# Patient Record
Sex: FEMALE | Race: WHITE | Employment: OTHER | URBAN - METROPOLITAN AREA
[De-identification: names, ages, dates, MRNs, and addresses within clinical notes are randomized per-mention and may not be internally consistent; named-entity substitution may affect disease eponyms.]

---

## 2017-08-01 ENCOUNTER — HOSPITAL ENCOUNTER (OUTPATIENT)
Dept: OTHER | Age: 82
Discharge: HOME | End: 2017-08-31
Attending: INTERNAL MEDICINE | Admitting: INTERNAL MEDICINE

## 2017-08-04 ENCOUNTER — HOSPITAL ENCOUNTER (OUTPATIENT)
Dept: GENERAL RADIOLOGY | Age: 82
Discharge: HOME | End: 2017-08-04
Attending: FAMILY MEDICINE | Admitting: FAMILY MEDICINE

## 2017-08-07 ENCOUNTER — HOSPITAL ENCOUNTER (OUTPATIENT)
Dept: INFUSION THERAPY | Age: 82
Discharge: HOME | End: 2017-08-07
Admitting: FAMILY MEDICINE

## 2017-08-07 VITALS
HEART RATE: 78 BPM | RESPIRATION RATE: 20 BRPM | TEMPERATURE: 98.4 F | SYSTOLIC BLOOD PRESSURE: 147 MMHG | DIASTOLIC BLOOD PRESSURE: 80 MMHG | OXYGEN SATURATION: 95 %

## 2017-08-07 RX ORDER — FUROSEMIDE 10 MG/ML
20 INJECTION INTRAMUSCULAR; INTRAVENOUS PRN
Status: DISCONTINUED | OUTPATIENT
Start: 2017-08-07 | End: 2017-08-08 | Stop reason: HOSPADM

## 2017-08-07 RX ORDER — 0.9 % SODIUM CHLORIDE 0.9 %
250 INTRAVENOUS SOLUTION INTRAVENOUS ONCE
Status: COMPLETED | OUTPATIENT
Start: 2017-08-07 | End: 2017-08-07

## 2017-08-07 RX ORDER — SODIUM CHLORIDE 0.9 % (FLUSH) 0.9 %
10 SYRINGE (ML) INJECTION PRN
Status: DISCONTINUED | OUTPATIENT
Start: 2017-08-07 | End: 2017-08-08 | Stop reason: HOSPADM

## 2017-08-07 RX ADMIN — Medication 10 ML: at 12:55

## 2017-08-07 RX ADMIN — Medication 250 ML: at 08:30

## 2017-08-07 RX ADMIN — Medication 10 ML: at 08:20

## 2017-08-07 RX ADMIN — FUROSEMIDE 20 MG: 10 INJECTION INTRAMUSCULAR; INTRAVENOUS at 12:55

## 2017-08-08 LAB
ALBUMIN SERPL-MCNC: 3.4 GM/DL (ref 3.4–5)
ANION GAP SERPL CALCULATED.3IONS-SCNC: 16 MMOL/L (ref 4–16)
BUN BLDV-MCNC: 21 MG/DL (ref 6–23)
CALCIUM SERPL-MCNC: 8.6 MG/DL (ref 8.3–10.6)
CHLORIDE BLD-SCNC: 101 MMOL/L (ref 99–110)
CO2: 22 MMOL/L (ref 21–32)
CREAT SERPL-MCNC: 1.6 MG/DL (ref 0.6–1.1)
GFR AFRICAN AMERICAN: 37 ML/MIN/1.73M2
GFR NON-AFRICAN AMERICAN: 30 ML/MIN/1.73M2
GLUCOSE BLD-MCNC: 107 MG/DL (ref 70–140)
HCT VFR BLD CALC: 29.8 % (ref 37–47)
HEMOGLOBIN: 9 GM/DL (ref 12.5–16)
MCH RBC QN AUTO: 28.1 PG (ref 27–31)
MCHC RBC AUTO-ENTMCNC: 30.2 % (ref 32–36)
MCV RBC AUTO: 93.1 FL (ref 78–100)
PDW BLD-RTO: 16.8 % (ref 11.7–14.9)
PHOSPHORUS: 3.4 MG/DL (ref 2.5–4.9)
PLATELET # BLD: 355 K/CU MM (ref 140–440)
PMV BLD AUTO: 9.2 FL (ref 7.5–11.1)
POTASSIUM SERPL-SCNC: 4 MMOL/L (ref 3.5–5.1)
RBC # BLD: 3.2 M/CU MM (ref 4.2–5.4)
SODIUM BLD-SCNC: 139 MMOL/L (ref 135–145)
WBC # BLD: 7.3 K/CU MM (ref 4–10.5)

## 2017-09-01 ENCOUNTER — HOSPITAL ENCOUNTER (OUTPATIENT)
Dept: OTHER | Age: 82
Discharge: HOME | End: 2017-09-30
Attending: INTERNAL MEDICINE | Admitting: INTERNAL MEDICINE

## 2019-01-01 ENCOUNTER — APPOINTMENT (OUTPATIENT)
Dept: GENERAL RADIOLOGY | Age: 84
DRG: 603 | End: 2019-01-01
Payer: MEDICARE

## 2019-01-01 ENCOUNTER — APPOINTMENT (OUTPATIENT)
Dept: ULTRASOUND IMAGING | Age: 84
DRG: 603 | End: 2019-01-01
Payer: MEDICARE

## 2019-01-01 ENCOUNTER — HOSPITAL ENCOUNTER (INPATIENT)
Age: 84
LOS: 5 days | Discharge: NURSING, REHAB OR LONG-TERM CARE | DRG: 603 | End: 2019-01-06
Attending: EMERGENCY MEDICINE | Admitting: INTERNAL MEDICINE
Payer: MEDICARE

## 2019-01-01 DIAGNOSIS — M79.601 PAIN OF RIGHT UPPER EXTREMITY: Primary | ICD-10-CM

## 2019-01-01 PROBLEM — N18.30 STAGE 3 CHRONIC KIDNEY DISEASE (HCC): Status: ACTIVE | Noted: 2019-01-01

## 2019-01-01 PROBLEM — E11.9 TYPE 2 DIABETES MELLITUS (HCC): Status: ACTIVE | Noted: 2019-01-01

## 2019-01-01 PROBLEM — D51.9 ANEMIA DUE TO VITAMIN B12 DEFICIENCY: Status: ACTIVE | Noted: 2019-01-01

## 2019-01-01 PROBLEM — I10 ESSENTIAL HYPERTENSION: Status: ACTIVE | Noted: 2019-01-01

## 2019-01-01 PROBLEM — L03.113 CELLULITIS OF RIGHT ARM: Status: ACTIVE | Noted: 2019-01-01

## 2019-01-01 LAB
ALBUMIN SERPL-MCNC: 3.6 GM/DL (ref 3.4–5)
ALP BLD-CCNC: 75 IU/L (ref 40–129)
ALT SERPL-CCNC: 12 U/L (ref 10–40)
ANION GAP SERPL CALCULATED.3IONS-SCNC: 15 MMOL/L (ref 4–16)
AST SERPL-CCNC: 16 IU/L (ref 15–37)
BASOPHILS ABSOLUTE: 0 K/CU MM
BASOPHILS RELATIVE PERCENT: 0.4 % (ref 0–1)
BILIRUB SERPL-MCNC: 0.5 MG/DL (ref 0–1)
BUN BLDV-MCNC: 25 MG/DL (ref 6–23)
CALCIUM SERPL-MCNC: 9.1 MG/DL (ref 8.3–10.6)
CHLORIDE BLD-SCNC: 98 MMOL/L (ref 99–110)
CO2: 21 MMOL/L (ref 21–32)
CREAT SERPL-MCNC: 1.6 MG/DL (ref 0.6–1.1)
DIFFERENTIAL TYPE: ABNORMAL
EOSINOPHILS ABSOLUTE: 0.1 K/CU MM
EOSINOPHILS RELATIVE PERCENT: 1.4 % (ref 0–3)
GFR AFRICAN AMERICAN: 37 ML/MIN/1.73M2
GFR NON-AFRICAN AMERICAN: 30 ML/MIN/1.73M2
GLUCOSE BLD-MCNC: 115 MG/DL (ref 70–99)
GLUCOSE BLD-MCNC: 150 MG/DL (ref 70–99)
HCT VFR BLD CALC: 33.4 % (ref 37–47)
HEMOGLOBIN: 10.3 GM/DL (ref 12.5–16)
IMMATURE NEUTROPHIL %: 0.2 % (ref 0–0.43)
LACTATE: 0.7 MMOL/L (ref 0.4–2)
LYMPHOCYTES ABSOLUTE: 0.8 K/CU MM
LYMPHOCYTES RELATIVE PERCENT: 8.5 % (ref 24–44)
MCH RBC QN AUTO: 32.3 PG (ref 27–31)
MCHC RBC AUTO-ENTMCNC: 30.8 % (ref 32–36)
MCV RBC AUTO: 104.7 FL (ref 78–100)
MONOCYTES ABSOLUTE: 0.6 K/CU MM
MONOCYTES RELATIVE PERCENT: 6.1 % (ref 0–4)
NUCLEATED RBC %: 0 %
PDW BLD-RTO: 13.1 % (ref 11.7–14.9)
PLATELET # BLD: 253 K/CU MM (ref 140–440)
PMV BLD AUTO: 9.1 FL (ref 7.5–11.1)
POTASSIUM SERPL-SCNC: 4 MMOL/L (ref 3.5–5.1)
RBC # BLD: 3.19 M/CU MM (ref 4.2–5.4)
SEGMENTED NEUTROPHILS ABSOLUTE COUNT: 7.9 K/CU MM
SEGMENTED NEUTROPHILS RELATIVE PERCENT: 83.4 % (ref 36–66)
SODIUM BLD-SCNC: 134 MMOL/L (ref 135–145)
TOTAL CK: 44 IU/L (ref 26–140)
TOTAL IMMATURE NEUTOROPHIL: 0.02 K/CU MM
TOTAL NUCLEATED RBC: 0 K/CU MM
TOTAL PROTEIN: 7 GM/DL (ref 6.4–8.2)
TROPONIN T: <0.01 NG/ML
WBC # BLD: 9.5 K/CU MM (ref 4–10.5)

## 2019-01-01 PROCEDURE — 87040 BLOOD CULTURE FOR BACTERIA: CPT

## 2019-01-01 PROCEDURE — 73030 X-RAY EXAM OF SHOULDER: CPT

## 2019-01-01 PROCEDURE — 6370000000 HC RX 637 (ALT 250 FOR IP): Performed by: NURSE PRACTITIONER

## 2019-01-01 PROCEDURE — 82962 GLUCOSE BLOOD TEST: CPT

## 2019-01-01 PROCEDURE — 83605 ASSAY OF LACTIC ACID: CPT

## 2019-01-01 PROCEDURE — 80053 COMPREHEN METABOLIC PANEL: CPT

## 2019-01-01 PROCEDURE — 6360000002 HC RX W HCPCS: Performed by: EMERGENCY MEDICINE

## 2019-01-01 PROCEDURE — 73110 X-RAY EXAM OF WRIST: CPT

## 2019-01-01 PROCEDURE — 93971 EXTREMITY STUDY: CPT

## 2019-01-01 PROCEDURE — 1200000000 HC SEMI PRIVATE

## 2019-01-01 PROCEDURE — 73080 X-RAY EXAM OF ELBOW: CPT

## 2019-01-01 PROCEDURE — 84484 ASSAY OF TROPONIN QUANT: CPT

## 2019-01-01 PROCEDURE — 85025 COMPLETE CBC W/AUTO DIFF WBC: CPT

## 2019-01-01 PROCEDURE — 99285 EMERGENCY DEPT VISIT HI MDM: CPT

## 2019-01-01 PROCEDURE — 96374 THER/PROPH/DIAG INJ IV PUSH: CPT

## 2019-01-01 PROCEDURE — 82550 ASSAY OF CK (CPK): CPT

## 2019-01-01 PROCEDURE — 2580000003 HC RX 258: Performed by: EMERGENCY MEDICINE

## 2019-01-01 PROCEDURE — 96376 TX/PRO/DX INJ SAME DRUG ADON: CPT

## 2019-01-01 PROCEDURE — 93005 ELECTROCARDIOGRAM TRACING: CPT | Performed by: EMERGENCY MEDICINE

## 2019-01-01 PROCEDURE — 2580000003 HC RX 258: Performed by: NURSE PRACTITIONER

## 2019-01-01 PROCEDURE — 36415 COLL VENOUS BLD VENIPUNCTURE: CPT

## 2019-01-01 PROCEDURE — 96375 TX/PRO/DX INJ NEW DRUG ADDON: CPT

## 2019-01-01 RX ORDER — CETIRIZINE HYDROCHLORIDE 10 MG/1
10 TABLET ORAL DAILY
Status: DISCONTINUED | OUTPATIENT
Start: 2019-01-01 | End: 2019-01-02

## 2019-01-01 RX ORDER — DIGOXIN 125 MCG
125 TABLET ORAL DAILY
Status: DISCONTINUED | OUTPATIENT
Start: 2019-01-01 | End: 2019-01-06 | Stop reason: HOSPADM

## 2019-01-01 RX ORDER — IRON POLYSACCHARIDE COMPLEX 150 MG
150 CAPSULE ORAL 2 TIMES DAILY WITH MEALS
Status: DISCONTINUED | OUTPATIENT
Start: 2019-01-01 | End: 2019-01-06 | Stop reason: HOSPADM

## 2019-01-01 RX ORDER — SODIUM CHLORIDE 0.9 % (FLUSH) 0.9 %
10 SYRINGE (ML) INJECTION EVERY 12 HOURS SCHEDULED
Status: DISCONTINUED | OUTPATIENT
Start: 2019-01-01 | End: 2019-01-06 | Stop reason: HOSPADM

## 2019-01-01 RX ORDER — MORPHINE SULFATE 4 MG/ML
2 INJECTION, SOLUTION INTRAMUSCULAR; INTRAVENOUS EVERY 30 MIN PRN
Status: DISCONTINUED | OUTPATIENT
Start: 2019-01-01 | End: 2019-01-01 | Stop reason: SDUPTHER

## 2019-01-01 RX ORDER — MORPHINE SULFATE 4 MG/ML
2 INJECTION, SOLUTION INTRAMUSCULAR; INTRAVENOUS EVERY 4 HOURS PRN
Status: DISCONTINUED | OUTPATIENT
Start: 2019-01-01 | End: 2019-01-06 | Stop reason: HOSPADM

## 2019-01-01 RX ORDER — GABAPENTIN 100 MG/1
100 CAPSULE ORAL 3 TIMES DAILY
Status: DISCONTINUED | OUTPATIENT
Start: 2019-01-01 | End: 2019-01-06 | Stop reason: HOSPADM

## 2019-01-01 RX ORDER — SODIUM CHLORIDE 0.9 % (FLUSH) 0.9 %
10 SYRINGE (ML) INJECTION PRN
Status: DISCONTINUED | OUTPATIENT
Start: 2019-01-01 | End: 2019-01-06 | Stop reason: HOSPADM

## 2019-01-01 RX ORDER — CARVEDILOL 6.25 MG/1
6.25 TABLET ORAL 2 TIMES DAILY WITH MEALS
Status: DISCONTINUED | OUTPATIENT
Start: 2019-01-01 | End: 2019-01-06 | Stop reason: HOSPADM

## 2019-01-01 RX ORDER — ONDANSETRON 2 MG/ML
4 INJECTION INTRAMUSCULAR; INTRAVENOUS EVERY 30 MIN PRN
Status: DISCONTINUED | OUTPATIENT
Start: 2019-01-01 | End: 2019-01-01 | Stop reason: SDUPTHER

## 2019-01-01 RX ORDER — ATORVASTATIN CALCIUM 40 MG/1
40 TABLET, FILM COATED ORAL DAILY
Status: DISCONTINUED | OUTPATIENT
Start: 2019-01-01 | End: 2019-01-06 | Stop reason: HOSPADM

## 2019-01-01 RX ORDER — HYDROCODONE BITARTRATE AND ACETAMINOPHEN 5; 325 MG/1; MG/1
1 TABLET ORAL EVERY 4 HOURS PRN
Status: DISCONTINUED | OUTPATIENT
Start: 2019-01-01 | End: 2019-01-06 | Stop reason: HOSPADM

## 2019-01-01 RX ORDER — AMLODIPINE BESYLATE 10 MG/1
10 TABLET ORAL DAILY
Status: DISCONTINUED | OUTPATIENT
Start: 2019-01-01 | End: 2019-01-06 | Stop reason: HOSPADM

## 2019-01-01 RX ORDER — ONDANSETRON 2 MG/ML
4 INJECTION INTRAMUSCULAR; INTRAVENOUS EVERY 6 HOURS PRN
Status: DISCONTINUED | OUTPATIENT
Start: 2019-01-01 | End: 2019-01-06 | Stop reason: HOSPADM

## 2019-01-01 RX ORDER — FUROSEMIDE 40 MG/1
40 TABLET ORAL DAILY
Status: DISCONTINUED | OUTPATIENT
Start: 2019-01-01 | End: 2019-01-06 | Stop reason: HOSPADM

## 2019-01-01 RX ORDER — ASPIRIN 81 MG/1
81 TABLET, CHEWABLE ORAL DAILY
Status: DISCONTINUED | OUTPATIENT
Start: 2019-01-01 | End: 2019-01-06 | Stop reason: HOSPADM

## 2019-01-01 RX ADMIN — HYDROCODONE BITARTRATE AND ACETAMINOPHEN 1 TABLET: 5; 325 TABLET ORAL at 21:51

## 2019-01-01 RX ADMIN — SODIUM CHLORIDE 3 G: 900 INJECTION INTRAVENOUS at 19:46

## 2019-01-01 RX ADMIN — AMLODIPINE BESYLATE 10 MG: 10 TABLET ORAL at 23:09

## 2019-01-01 RX ADMIN — FUROSEMIDE 40 MG: 40 TABLET ORAL at 23:09

## 2019-01-01 RX ADMIN — GABAPENTIN 100 MG: 100 CAPSULE ORAL at 23:08

## 2019-01-01 RX ADMIN — RIVAROXABAN 15 MG: 15 TABLET, FILM COATED ORAL at 23:10

## 2019-01-01 RX ADMIN — ONDANSETRON 4 MG: 2 INJECTION INTRAMUSCULAR; INTRAVENOUS at 15:44

## 2019-01-01 RX ADMIN — CARVEDILOL 6.25 MG: 6.25 TABLET, FILM COATED ORAL at 23:09

## 2019-01-01 RX ADMIN — MORPHINE SULFATE 2 MG: 4 INJECTION INTRAVENOUS at 15:43

## 2019-01-01 RX ADMIN — ASPIRIN 81 MG 81 MG: 81 TABLET ORAL at 23:09

## 2019-01-01 RX ADMIN — Medication 150 MG: at 23:08

## 2019-01-01 RX ADMIN — MORPHINE SULFATE 2 MG: 4 INJECTION INTRAVENOUS at 19:46

## 2019-01-01 RX ADMIN — ATORVASTATIN CALCIUM 40 MG: 40 TABLET, FILM COATED ORAL at 23:08

## 2019-01-01 RX ADMIN — SODIUM CHLORIDE, PRESERVATIVE FREE 10 ML: 5 INJECTION INTRAVENOUS at 23:11

## 2019-01-01 RX ADMIN — DIGOXIN 125 MCG: 125 TABLET ORAL at 23:09

## 2019-01-01 RX ADMIN — ONDANSETRON 4 MG: 2 INJECTION INTRAMUSCULAR; INTRAVENOUS at 19:46

## 2019-01-01 RX ADMIN — LINAGLIPTIN 5 MG: 5 TABLET, FILM COATED ORAL at 23:08

## 2019-01-01 ASSESSMENT — PAIN DESCRIPTION - ORIENTATION
ORIENTATION: RIGHT
ORIENTATION: RIGHT

## 2019-01-01 ASSESSMENT — PAIN SCALES - GENERAL
PAINLEVEL_OUTOF10: 9
PAINLEVEL_OUTOF10: 8
PAINLEVEL_OUTOF10: 9
PAINLEVEL_OUTOF10: 10

## 2019-01-01 ASSESSMENT — PAIN DESCRIPTION - LOCATION
LOCATION: ARM
LOCATION: ARM

## 2019-01-01 ASSESSMENT — PAIN DESCRIPTION - PAIN TYPE
TYPE: ACUTE PAIN
TYPE: ACUTE PAIN

## 2019-01-02 LAB
ALBUMIN SERPL-MCNC: 3.5 GM/DL (ref 3.4–5)
ALBUMIN SERPL-MCNC: 3.5 GM/DL (ref 3.4–5)
ALP BLD-CCNC: 69 IU/L (ref 40–128)
ALP BLD-CCNC: 69 IU/L (ref 40–129)
ALT SERPL-CCNC: 10 U/L (ref 10–40)
ALT SERPL-CCNC: 10 U/L (ref 10–40)
ANION GAP SERPL CALCULATED.3IONS-SCNC: 13 MMOL/L (ref 4–16)
AST SERPL-CCNC: 15 IU/L (ref 15–37)
AST SERPL-CCNC: 15 IU/L (ref 15–37)
BASOPHILS ABSOLUTE: 0 K/CU MM
BASOPHILS RELATIVE PERCENT: 0.5 % (ref 0–1)
BILIRUB SERPL-MCNC: 0.4 MG/DL (ref 0–1)
BILIRUB SERPL-MCNC: 0.4 MG/DL (ref 0–1)
BILIRUBIN DIRECT: 0.2 MG/DL (ref 0–0.3)
BILIRUBIN, INDIRECT: 0.2 MG/DL (ref 0–0.7)
BUN BLDV-MCNC: 27 MG/DL (ref 6–23)
CALCIUM SERPL-MCNC: 8.5 MG/DL (ref 8.3–10.6)
CHLORIDE BLD-SCNC: 101 MMOL/L (ref 99–110)
CO2: 25 MMOL/L (ref 21–32)
CREAT SERPL-MCNC: 1.6 MG/DL (ref 0.6–1.1)
DIFFERENTIAL TYPE: ABNORMAL
DIGOXIN LEVEL: 1.7 NG/ML (ref 0.8–2)
EOSINOPHILS ABSOLUTE: 0.1 K/CU MM
EOSINOPHILS RELATIVE PERCENT: 1.3 % (ref 0–3)
FOLATE: 19.9 NG/ML (ref 3.1–17.5)
GFR AFRICAN AMERICAN: 37 ML/MIN/1.73M2
GFR NON-AFRICAN AMERICAN: 30 ML/MIN/1.73M2
GLUCOSE BLD-MCNC: 101 MG/DL (ref 70–99)
GLUCOSE BLD-MCNC: 128 MG/DL (ref 70–99)
GLUCOSE BLD-MCNC: 152 MG/DL (ref 70–99)
GLUCOSE BLD-MCNC: 93 MG/DL (ref 70–99)
HCT VFR BLD CALC: 29.6 % (ref 37–47)
HEMOGLOBIN: 9.3 GM/DL (ref 12.5–16)
IMMATURE NEUTROPHIL %: 0.7 % (ref 0–0.43)
IRON: 16 UG/DL (ref 37–145)
LACTATE: 0.6 MMOL/L (ref 0.4–2)
LYMPHOCYTES ABSOLUTE: 1.5 K/CU MM
LYMPHOCYTES RELATIVE PERCENT: 20.1 % (ref 24–44)
MCH RBC QN AUTO: 32.7 PG (ref 27–31)
MCHC RBC AUTO-ENTMCNC: 31.4 % (ref 32–36)
MCV RBC AUTO: 104.2 FL (ref 78–100)
MONOCYTES ABSOLUTE: 0.8 K/CU MM
MONOCYTES RELATIVE PERCENT: 10.5 % (ref 0–4)
NUCLEATED RBC %: 0 %
PCT TRANSFERRIN: 6 % (ref 10–44)
PDW BLD-RTO: 13 % (ref 11.7–14.9)
PLATELET # BLD: 222 K/CU MM (ref 140–440)
PMV BLD AUTO: 8.9 FL (ref 7.5–11.1)
POTASSIUM SERPL-SCNC: 4 MMOL/L (ref 3.5–5.1)
RBC # BLD: 2.84 M/CU MM (ref 4.2–5.4)
SEGMENTED NEUTROPHILS ABSOLUTE COUNT: 5 K/CU MM
SEGMENTED NEUTROPHILS RELATIVE PERCENT: 66.9 % (ref 36–66)
SODIUM BLD-SCNC: 139 MMOL/L (ref 135–145)
TOTAL IMMATURE NEUTOROPHIL: 0.05 K/CU MM
TOTAL IRON BINDING CAPACITY: 256 UG/DL (ref 250–450)
TOTAL NUCLEATED RBC: 0 K/CU MM
TOTAL PROTEIN: 6.1 GM/DL (ref 6.4–8.2)
TOTAL PROTEIN: 6.1 GM/DL (ref 6.4–8.2)
UNSATURATED IRON BINDING CAPACITY: 240 UG/DL (ref 110–370)
URIC ACID: 7.8 MG/DL (ref 2.6–6)
VITAMIN B-12: 1174 PG/ML (ref 211–911)
WBC # BLD: 7.5 K/CU MM (ref 4–10.5)

## 2019-01-02 PROCEDURE — 6370000000 HC RX 637 (ALT 250 FOR IP): Performed by: HOSPITALIST

## 2019-01-02 PROCEDURE — 83550 IRON BINDING TEST: CPT

## 2019-01-02 PROCEDURE — 84550 ASSAY OF BLOOD/URIC ACID: CPT

## 2019-01-02 PROCEDURE — G0378 HOSPITAL OBSERVATION PER HR: HCPCS

## 2019-01-02 PROCEDURE — 82962 GLUCOSE BLOOD TEST: CPT

## 2019-01-02 PROCEDURE — 6370000000 HC RX 637 (ALT 250 FOR IP): Performed by: NURSE PRACTITIONER

## 2019-01-02 PROCEDURE — 1200000000 HC SEMI PRIVATE

## 2019-01-02 PROCEDURE — 80053 COMPREHEN METABOLIC PANEL: CPT

## 2019-01-02 PROCEDURE — 83540 ASSAY OF IRON: CPT

## 2019-01-02 PROCEDURE — 80162 ASSAY OF DIGOXIN TOTAL: CPT

## 2019-01-02 PROCEDURE — 36415 COLL VENOUS BLD VENIPUNCTURE: CPT

## 2019-01-02 PROCEDURE — 82607 VITAMIN B-12: CPT

## 2019-01-02 PROCEDURE — 85025 COMPLETE CBC W/AUTO DIFF WBC: CPT

## 2019-01-02 PROCEDURE — 6360000002 HC RX W HCPCS: Performed by: NURSE PRACTITIONER

## 2019-01-02 PROCEDURE — 6370000000 HC RX 637 (ALT 250 FOR IP): Performed by: INTERNAL MEDICINE

## 2019-01-02 PROCEDURE — 93010 ELECTROCARDIOGRAM REPORT: CPT | Performed by: INTERNAL MEDICINE

## 2019-01-02 PROCEDURE — 2580000003 HC RX 258: Performed by: NURSE PRACTITIONER

## 2019-01-02 PROCEDURE — 82248 BILIRUBIN DIRECT: CPT

## 2019-01-02 PROCEDURE — 82746 ASSAY OF FOLIC ACID SERUM: CPT

## 2019-01-02 PROCEDURE — 83605 ASSAY OF LACTIC ACID: CPT

## 2019-01-02 RX ORDER — ACETAMINOPHEN 325 MG/1
650 TABLET ORAL EVERY 4 HOURS PRN
Status: DISCONTINUED | OUTPATIENT
Start: 2019-01-02 | End: 2019-01-06 | Stop reason: HOSPADM

## 2019-01-02 RX ORDER — CETIRIZINE HYDROCHLORIDE 10 MG/1
5 TABLET ORAL DAILY
Status: DISCONTINUED | OUTPATIENT
Start: 2019-01-02 | End: 2019-01-06 | Stop reason: HOSPADM

## 2019-01-02 RX ORDER — CETIRIZINE HYDROCHLORIDE 10 MG/1
5 TABLET ORAL ONCE
Status: COMPLETED | OUTPATIENT
Start: 2019-01-02 | End: 2019-01-02

## 2019-01-02 RX ADMIN — CARVEDILOL 6.25 MG: 6.25 TABLET, FILM COATED ORAL at 11:18

## 2019-01-02 RX ADMIN — CETIRIZINE HYDROCHLORIDE 5 MG: 10 TABLET, FILM COATED ORAL at 11:17

## 2019-01-02 RX ADMIN — GABAPENTIN 100 MG: 100 CAPSULE ORAL at 15:32

## 2019-01-02 RX ADMIN — FUROSEMIDE 40 MG: 40 TABLET ORAL at 11:18

## 2019-01-02 RX ADMIN — AMLODIPINE BESYLATE 10 MG: 10 TABLET ORAL at 11:18

## 2019-01-02 RX ADMIN — HYDROCODONE BITARTRATE AND ACETAMINOPHEN 1 TABLET: 5; 325 TABLET ORAL at 11:17

## 2019-01-02 RX ADMIN — SODIUM CHLORIDE, PRESERVATIVE FREE 10 ML: 5 INJECTION INTRAVENOUS at 11:25

## 2019-01-02 RX ADMIN — GABAPENTIN 100 MG: 100 CAPSULE ORAL at 11:18

## 2019-01-02 RX ADMIN — SODIUM CHLORIDE, PRESERVATIVE FREE 10 ML: 5 INJECTION INTRAVENOUS at 21:37

## 2019-01-02 RX ADMIN — HYDROCODONE BITARTRATE AND ACETAMINOPHEN 1 TABLET: 5; 325 TABLET ORAL at 19:37

## 2019-01-02 RX ADMIN — DIGOXIN 125 MCG: 125 TABLET ORAL at 11:17

## 2019-01-02 RX ADMIN — GABAPENTIN 100 MG: 100 CAPSULE ORAL at 21:36

## 2019-01-02 RX ADMIN — SODIUM CHLORIDE 3 G: 900 INJECTION INTRAVENOUS at 11:24

## 2019-01-02 RX ADMIN — INSULIN LISPRO 1 UNITS: 100 INJECTION, SOLUTION INTRAVENOUS; SUBCUTANEOUS at 21:37

## 2019-01-02 RX ADMIN — CARVEDILOL 6.25 MG: 6.25 TABLET, FILM COATED ORAL at 15:31

## 2019-01-02 RX ADMIN — Medication 150 MG: at 19:37

## 2019-01-02 RX ADMIN — Medication 150 MG: at 11:17

## 2019-01-02 RX ADMIN — CETIRIZINE HYDROCHLORIDE 5 MG: 10 TABLET, FILM COATED ORAL at 02:36

## 2019-01-02 RX ADMIN — ASPIRIN 81 MG 81 MG: 81 TABLET ORAL at 11:18

## 2019-01-02 RX ADMIN — RIVAROXABAN 15 MG: 15 TABLET, FILM COATED ORAL at 15:31

## 2019-01-02 RX ADMIN — LINAGLIPTIN 5 MG: 5 TABLET, FILM COATED ORAL at 11:16

## 2019-01-02 RX ADMIN — ACETAMINOPHEN 650 MG: 325 TABLET ORAL at 21:36

## 2019-01-02 RX ADMIN — HYDROCODONE BITARTRATE AND ACETAMINOPHEN 1 TABLET: 5; 325 TABLET ORAL at 15:31

## 2019-01-02 RX ADMIN — ATORVASTATIN CALCIUM 40 MG: 40 TABLET, FILM COATED ORAL at 11:17

## 2019-01-02 ASSESSMENT — PAIN DESCRIPTION - FREQUENCY: FREQUENCY: CONTINUOUS

## 2019-01-02 ASSESSMENT — PAIN SCALES - GENERAL
PAINLEVEL_OUTOF10: 8
PAINLEVEL_OUTOF10: 0
PAINLEVEL_OUTOF10: 8
PAINLEVEL_OUTOF10: 6
PAINLEVEL_OUTOF10: 6
PAINLEVEL_OUTOF10: 2

## 2019-01-02 ASSESSMENT — PAIN DESCRIPTION - ORIENTATION
ORIENTATION: RIGHT
ORIENTATION: RIGHT

## 2019-01-02 ASSESSMENT — PAIN DESCRIPTION - LOCATION
LOCATION: ARM
LOCATION: ARM

## 2019-01-02 ASSESSMENT — PAIN DESCRIPTION - PAIN TYPE
TYPE: ACUTE PAIN
TYPE: ACUTE PAIN

## 2019-01-02 ASSESSMENT — PAIN DESCRIPTION - PROGRESSION
CLINICAL_PROGRESSION: GRADUALLY WORSENING
CLINICAL_PROGRESSION: GRADUALLY WORSENING

## 2019-01-02 ASSESSMENT — PAIN DESCRIPTION - ONSET: ONSET: ON-GOING

## 2019-01-03 LAB
GLUCOSE BLD-MCNC: 118 MG/DL (ref 70–99)
GLUCOSE BLD-MCNC: 134 MG/DL (ref 70–99)
GLUCOSE BLD-MCNC: 145 MG/DL (ref 70–99)
GLUCOSE BLD-MCNC: 189 MG/DL (ref 70–99)

## 2019-01-03 PROCEDURE — G0328 FECAL BLOOD SCRN IMMUNOASSAY: HCPCS

## 2019-01-03 PROCEDURE — 2580000003 HC RX 258: Performed by: NURSE PRACTITIONER

## 2019-01-03 PROCEDURE — 6370000000 HC RX 637 (ALT 250 FOR IP): Performed by: NURSE PRACTITIONER

## 2019-01-03 PROCEDURE — 6370000000 HC RX 637 (ALT 250 FOR IP): Performed by: HOSPITALIST

## 2019-01-03 PROCEDURE — 82962 GLUCOSE BLOOD TEST: CPT

## 2019-01-03 PROCEDURE — 6360000002 HC RX W HCPCS: Performed by: HOSPITALIST

## 2019-01-03 PROCEDURE — 99218 PR INITIAL OBSERVATION CARE/DAY 30 MINUTES: CPT | Performed by: ORTHOPAEDIC SURGERY

## 2019-01-03 PROCEDURE — 2700000000 HC OXYGEN THERAPY PER DAY

## 2019-01-03 PROCEDURE — G0378 HOSPITAL OBSERVATION PER HR: HCPCS

## 2019-01-03 PROCEDURE — 94761 N-INVAS EAR/PLS OXIMETRY MLT: CPT

## 2019-01-03 PROCEDURE — 1200000000 HC SEMI PRIVATE

## 2019-01-03 PROCEDURE — 2580000003 HC RX 258: Performed by: HOSPITALIST

## 2019-01-03 RX ADMIN — GABAPENTIN 100 MG: 100 CAPSULE ORAL at 16:20

## 2019-01-03 RX ADMIN — ATORVASTATIN CALCIUM 40 MG: 40 TABLET, FILM COATED ORAL at 11:22

## 2019-01-03 RX ADMIN — ASPIRIN 81 MG 81 MG: 81 TABLET ORAL at 11:22

## 2019-01-03 RX ADMIN — CARVEDILOL 6.25 MG: 6.25 TABLET, FILM COATED ORAL at 11:22

## 2019-01-03 RX ADMIN — FUROSEMIDE 40 MG: 40 TABLET ORAL at 11:24

## 2019-01-03 RX ADMIN — CARVEDILOL 6.25 MG: 6.25 TABLET, FILM COATED ORAL at 19:06

## 2019-01-03 RX ADMIN — INSULIN LISPRO 1 UNITS: 100 INJECTION, SOLUTION INTRAVENOUS; SUBCUTANEOUS at 21:25

## 2019-01-03 RX ADMIN — Medication 150 MG: at 11:24

## 2019-01-03 RX ADMIN — GABAPENTIN 100 MG: 100 CAPSULE ORAL at 21:25

## 2019-01-03 RX ADMIN — CEFEPIME HYDROCHLORIDE 1 G: 1 INJECTION, POWDER, FOR SOLUTION INTRAMUSCULAR; INTRAVENOUS at 14:06

## 2019-01-03 RX ADMIN — DIGOXIN 125 MCG: 125 TABLET ORAL at 11:24

## 2019-01-03 RX ADMIN — LINAGLIPTIN 5 MG: 5 TABLET, FILM COATED ORAL at 11:22

## 2019-01-03 RX ADMIN — SODIUM CHLORIDE, PRESERVATIVE FREE 10 ML: 5 INJECTION INTRAVENOUS at 11:30

## 2019-01-03 RX ADMIN — GABAPENTIN 100 MG: 100 CAPSULE ORAL at 11:24

## 2019-01-03 RX ADMIN — Medication 150 MG: at 19:05

## 2019-01-03 RX ADMIN — CETIRIZINE HYDROCHLORIDE 5 MG: 10 TABLET, FILM COATED ORAL at 11:25

## 2019-01-03 RX ADMIN — AMLODIPINE BESYLATE 10 MG: 10 TABLET ORAL at 11:24

## 2019-01-03 ASSESSMENT — ENCOUNTER SYMPTOMS: COLOR CHANGE: 0

## 2019-01-04 LAB
GLUCOSE BLD-MCNC: 118 MG/DL (ref 70–99)
GLUCOSE BLD-MCNC: 144 MG/DL (ref 70–99)
GLUCOSE BLD-MCNC: 154 MG/DL (ref 70–99)
GLUCOSE BLD-MCNC: 97 MG/DL (ref 70–99)

## 2019-01-04 PROCEDURE — 82962 GLUCOSE BLOOD TEST: CPT

## 2019-01-04 PROCEDURE — 2580000003 HC RX 258: Performed by: HOSPITALIST

## 2019-01-04 PROCEDURE — 6370000000 HC RX 637 (ALT 250 FOR IP): Performed by: HOSPITALIST

## 2019-01-04 PROCEDURE — G8988 SELF CARE GOAL STATUS: HCPCS

## 2019-01-04 PROCEDURE — 6370000000 HC RX 637 (ALT 250 FOR IP): Performed by: NURSE PRACTITIONER

## 2019-01-04 PROCEDURE — 97530 THERAPEUTIC ACTIVITIES: CPT

## 2019-01-04 PROCEDURE — G8978 MOBILITY CURRENT STATUS: HCPCS

## 2019-01-04 PROCEDURE — G8987 SELF CARE CURRENT STATUS: HCPCS

## 2019-01-04 PROCEDURE — G8979 MOBILITY GOAL STATUS: HCPCS

## 2019-01-04 PROCEDURE — 2580000003 HC RX 258: Performed by: NURSE PRACTITIONER

## 2019-01-04 PROCEDURE — 6360000002 HC RX W HCPCS: Performed by: HOSPITALIST

## 2019-01-04 PROCEDURE — 97166 OT EVAL MOD COMPLEX 45 MIN: CPT

## 2019-01-04 PROCEDURE — 97163 PT EVAL HIGH COMPLEX 45 MIN: CPT

## 2019-01-04 PROCEDURE — 1200000000 HC SEMI PRIVATE

## 2019-01-04 RX ADMIN — CARVEDILOL 6.25 MG: 6.25 TABLET, FILM COATED ORAL at 18:31

## 2019-01-04 RX ADMIN — CEFEPIME HYDROCHLORIDE 1 G: 1 INJECTION, POWDER, FOR SOLUTION INTRAMUSCULAR; INTRAVENOUS at 13:06

## 2019-01-04 RX ADMIN — AMLODIPINE BESYLATE 10 MG: 10 TABLET ORAL at 10:00

## 2019-01-04 RX ADMIN — CARVEDILOL 6.25 MG: 6.25 TABLET, FILM COATED ORAL at 10:00

## 2019-01-04 RX ADMIN — DIGOXIN 125 MCG: 125 TABLET ORAL at 10:00

## 2019-01-04 RX ADMIN — SODIUM CHLORIDE, PRESERVATIVE FREE 10 ML: 5 INJECTION INTRAVENOUS at 10:01

## 2019-01-04 RX ADMIN — ASPIRIN 81 MG 81 MG: 81 TABLET ORAL at 10:01

## 2019-01-04 RX ADMIN — FUROSEMIDE 40 MG: 40 TABLET ORAL at 10:00

## 2019-01-04 RX ADMIN — CETIRIZINE HYDROCHLORIDE 5 MG: 10 TABLET, FILM COATED ORAL at 10:01

## 2019-01-04 RX ADMIN — SODIUM CHLORIDE, PRESERVATIVE FREE 10 ML: 5 INJECTION INTRAVENOUS at 21:39

## 2019-01-04 RX ADMIN — Medication 150 MG: at 10:01

## 2019-01-04 RX ADMIN — GABAPENTIN 100 MG: 100 CAPSULE ORAL at 21:38

## 2019-01-04 RX ADMIN — GABAPENTIN 100 MG: 100 CAPSULE ORAL at 10:00

## 2019-01-04 RX ADMIN — Medication 150 MG: at 18:31

## 2019-01-04 RX ADMIN — LINAGLIPTIN 5 MG: 5 TABLET, FILM COATED ORAL at 10:00

## 2019-01-04 RX ADMIN — ATORVASTATIN CALCIUM 40 MG: 40 TABLET, FILM COATED ORAL at 10:01

## 2019-01-04 ASSESSMENT — PAIN DESCRIPTION - ORIENTATION
ORIENTATION: RIGHT
ORIENTATION: RIGHT

## 2019-01-04 ASSESSMENT — PAIN DESCRIPTION - PAIN TYPE
TYPE: ACUTE PAIN
TYPE: ACUTE PAIN

## 2019-01-04 ASSESSMENT — PAIN DESCRIPTION - DESCRIPTORS: DESCRIPTORS: NUMBNESS;ACHING

## 2019-01-04 ASSESSMENT — PAIN DESCRIPTION - LOCATION: LOCATION: ARM

## 2019-01-04 ASSESSMENT — PAIN SCALES - WONG BAKER: WONGBAKER_NUMERICALRESPONSE: 4

## 2019-01-05 LAB
GLUCOSE BLD-MCNC: 112 MG/DL (ref 70–99)
GLUCOSE BLD-MCNC: 119 MG/DL (ref 70–99)
GLUCOSE BLD-MCNC: 125 MG/DL (ref 70–99)
GLUCOSE BLD-MCNC: 130 MG/DL (ref 70–99)
HEMOCCULT SP1 STL QL: NEGATIVE
OCCULT BLOOD 2: NEGATIVE
OCCULT BLOOD 3: NEGATIVE

## 2019-01-05 PROCEDURE — 6370000000 HC RX 637 (ALT 250 FOR IP): Performed by: HOSPITALIST

## 2019-01-05 PROCEDURE — 6370000000 HC RX 637 (ALT 250 FOR IP): Performed by: NURSE PRACTITIONER

## 2019-01-05 PROCEDURE — 2580000003 HC RX 258: Performed by: HOSPITALIST

## 2019-01-05 PROCEDURE — 6360000002 HC RX W HCPCS: Performed by: HOSPITALIST

## 2019-01-05 PROCEDURE — 2580000003 HC RX 258: Performed by: NURSE PRACTITIONER

## 2019-01-05 PROCEDURE — 1200000000 HC SEMI PRIVATE

## 2019-01-05 PROCEDURE — 82962 GLUCOSE BLOOD TEST: CPT

## 2019-01-05 RX ORDER — ACETAMINOPHEN 80 MG
TABLET,CHEWABLE ORAL
Status: COMPLETED
Start: 2019-01-05 | End: 2019-01-05

## 2019-01-05 RX ADMIN — GABAPENTIN 100 MG: 100 CAPSULE ORAL at 12:44

## 2019-01-05 RX ADMIN — CEFEPIME HYDROCHLORIDE 1 G: 1 INJECTION, POWDER, FOR SOLUTION INTRAMUSCULAR; INTRAVENOUS at 12:48

## 2019-01-05 RX ADMIN — CARVEDILOL 6.25 MG: 6.25 TABLET, FILM COATED ORAL at 18:32

## 2019-01-05 RX ADMIN — Medication: at 12:45

## 2019-01-05 RX ADMIN — FUROSEMIDE 40 MG: 40 TABLET ORAL at 08:44

## 2019-01-05 RX ADMIN — SODIUM CHLORIDE, PRESERVATIVE FREE 10 ML: 5 INJECTION INTRAVENOUS at 23:26

## 2019-01-05 RX ADMIN — HYDROCODONE BITARTRATE AND ACETAMINOPHEN 1 TABLET: 5; 325 TABLET ORAL at 06:24

## 2019-01-05 RX ADMIN — RIVAROXABAN 15 MG: 15 TABLET, FILM COATED ORAL at 18:33

## 2019-01-05 RX ADMIN — Medication 150 MG: at 18:33

## 2019-01-05 RX ADMIN — GABAPENTIN 100 MG: 100 CAPSULE ORAL at 08:44

## 2019-01-05 RX ADMIN — GABAPENTIN 100 MG: 100 CAPSULE ORAL at 23:26

## 2019-01-05 RX ADMIN — DIGOXIN 125 MCG: 125 TABLET ORAL at 08:44

## 2019-01-05 RX ADMIN — CETIRIZINE HYDROCHLORIDE 5 MG: 10 TABLET, FILM COATED ORAL at 08:37

## 2019-01-05 RX ADMIN — ASPIRIN 81 MG 81 MG: 81 TABLET ORAL at 08:37

## 2019-01-05 RX ADMIN — Medication 150 MG: at 08:44

## 2019-01-05 RX ADMIN — ATORVASTATIN CALCIUM 40 MG: 40 TABLET, FILM COATED ORAL at 08:38

## 2019-01-05 RX ADMIN — LINAGLIPTIN 5 MG: 5 TABLET, FILM COATED ORAL at 08:44

## 2019-01-05 RX ADMIN — SODIUM CHLORIDE, PRESERVATIVE FREE 10 ML: 5 INJECTION INTRAVENOUS at 08:38

## 2019-01-05 ASSESSMENT — PAIN SCALES - GENERAL: PAINLEVEL_OUTOF10: 8

## 2019-01-06 ENCOUNTER — HOSPITAL ENCOUNTER (INPATIENT)
Age: 84
LOS: 2 days | Discharge: ANOTHER HOSPITAL | DRG: 603 | End: 2019-01-08
Attending: PHYSICAL MEDICINE & REHABILITATION | Admitting: PHYSICAL MEDICINE & REHABILITATION
Payer: MEDICARE

## 2019-01-06 VITALS
BODY MASS INDEX: 17.19 KG/M2 | HEART RATE: 62 BPM | OXYGEN SATURATION: 95 % | TEMPERATURE: 98.7 F | DIASTOLIC BLOOD PRESSURE: 64 MMHG | RESPIRATION RATE: 16 BRPM | HEIGHT: 65 IN | WEIGHT: 103.2 LBS | SYSTOLIC BLOOD PRESSURE: 139 MMHG

## 2019-01-06 PROBLEM — R26.9 GAIT DISTURBANCE: Status: ACTIVE | Noted: 2019-01-06

## 2019-01-06 PROBLEM — N18.4 CKD (CHRONIC KIDNEY DISEASE) STAGE 4, GFR 15-29 ML/MIN (HCC): Status: ACTIVE | Noted: 2019-01-06

## 2019-01-06 PROBLEM — R53.81 DEBILITY: Status: ACTIVE | Noted: 2019-01-06

## 2019-01-06 PROBLEM — R52 UNCONTROLLED PAIN: Status: ACTIVE | Noted: 2019-01-06

## 2019-01-06 LAB
CULTURE: NORMAL
CULTURE: NORMAL
GLUCOSE BLD-MCNC: 100 MG/DL (ref 70–99)
GLUCOSE BLD-MCNC: 121 MG/DL (ref 70–99)
GLUCOSE BLD-MCNC: 160 MG/DL (ref 70–99)
Lab: NORMAL
Lab: NORMAL
REPORT STATUS: NORMAL
REPORT STATUS: NORMAL
SPECIMEN: NORMAL
SPECIMEN: NORMAL

## 2019-01-06 PROCEDURE — 82962 GLUCOSE BLOOD TEST: CPT

## 2019-01-06 PROCEDURE — 6370000000 HC RX 637 (ALT 250 FOR IP): Performed by: HOSPITALIST

## 2019-01-06 PROCEDURE — 6360000002 HC RX W HCPCS: Performed by: HOSPITALIST

## 2019-01-06 PROCEDURE — 2580000003 HC RX 258: Performed by: HOSPITALIST

## 2019-01-06 PROCEDURE — 99223 1ST HOSP IP/OBS HIGH 75: CPT | Performed by: PHYSICAL MEDICINE & REHABILITATION

## 2019-01-06 PROCEDURE — 2580000003 HC RX 258: Performed by: NURSE PRACTITIONER

## 2019-01-06 PROCEDURE — 6370000000 HC RX 637 (ALT 250 FOR IP): Performed by: PHYSICAL MEDICINE & REHABILITATION

## 2019-01-06 PROCEDURE — 6370000000 HC RX 637 (ALT 250 FOR IP): Performed by: NURSE PRACTITIONER

## 2019-01-06 PROCEDURE — 1280000000 HC REHAB R&B

## 2019-01-06 RX ORDER — IRON POLYSACCHARIDE COMPLEX 150 MG
150 CAPSULE ORAL 2 TIMES DAILY WITH MEALS
Status: CANCELLED | OUTPATIENT
Start: 2019-01-06

## 2019-01-06 RX ORDER — ACETAMINOPHEN 325 MG/1
650 TABLET ORAL EVERY 4 HOURS PRN
Status: CANCELLED | OUTPATIENT
Start: 2019-01-06

## 2019-01-06 RX ORDER — DEXTROSE MONOHYDRATE 25 G/50ML
12.5 INJECTION, SOLUTION INTRAVENOUS PRN
Status: CANCELLED | OUTPATIENT
Start: 2019-01-06

## 2019-01-06 RX ORDER — ASPIRIN 81 MG/1
81 TABLET, CHEWABLE ORAL DAILY
Status: CANCELLED | OUTPATIENT
Start: 2019-01-07

## 2019-01-06 RX ORDER — AMLODIPINE BESYLATE 10 MG/1
10 TABLET ORAL DAILY
Status: DISCONTINUED | OUTPATIENT
Start: 2019-01-07 | End: 2019-01-09 | Stop reason: HOSPADM

## 2019-01-06 RX ORDER — CARVEDILOL 6.25 MG/1
6.25 TABLET ORAL 2 TIMES DAILY WITH MEALS
Status: CANCELLED | OUTPATIENT
Start: 2019-01-06

## 2019-01-06 RX ORDER — IRON POLYSACCHARIDE COMPLEX 150 MG
150 CAPSULE ORAL 2 TIMES DAILY WITH MEALS
Status: DISCONTINUED | OUTPATIENT
Start: 2019-01-07 | End: 2019-01-09 | Stop reason: HOSPADM

## 2019-01-06 RX ORDER — ASPIRIN 81 MG/1
81 TABLET, CHEWABLE ORAL DAILY
Status: DISCONTINUED | OUTPATIENT
Start: 2019-01-07 | End: 2019-01-09 | Stop reason: HOSPADM

## 2019-01-06 RX ORDER — FUROSEMIDE 40 MG/1
40 TABLET ORAL DAILY
Status: CANCELLED | OUTPATIENT
Start: 2019-01-07

## 2019-01-06 RX ORDER — GABAPENTIN 100 MG/1
100 CAPSULE ORAL 3 TIMES DAILY
Status: CANCELLED | OUTPATIENT
Start: 2019-01-06

## 2019-01-06 RX ORDER — AMLODIPINE BESYLATE 10 MG/1
10 TABLET ORAL DAILY
Status: CANCELLED | OUTPATIENT
Start: 2019-01-07

## 2019-01-06 RX ORDER — DIGOXIN 125 MCG
125 TABLET ORAL DAILY
Status: DISCONTINUED | OUTPATIENT
Start: 2019-01-07 | End: 2019-01-08

## 2019-01-06 RX ORDER — CEPHALEXIN 500 MG/1
500 CAPSULE ORAL EVERY 8 HOURS SCHEDULED
Status: DISCONTINUED | OUTPATIENT
Start: 2019-01-06 | End: 2019-01-09 | Stop reason: HOSPADM

## 2019-01-06 RX ORDER — GABAPENTIN 100 MG/1
100 CAPSULE ORAL 3 TIMES DAILY
Status: DISCONTINUED | OUTPATIENT
Start: 2019-01-06 | End: 2019-01-09 | Stop reason: HOSPADM

## 2019-01-06 RX ORDER — NICOTINE POLACRILEX 4 MG
15 LOZENGE BUCCAL PRN
Status: DISCONTINUED | OUTPATIENT
Start: 2019-01-06 | End: 2019-01-09 | Stop reason: HOSPADM

## 2019-01-06 RX ORDER — DIGOXIN 125 MCG
125 TABLET ORAL DAILY
Status: CANCELLED | OUTPATIENT
Start: 2019-01-07

## 2019-01-06 RX ORDER — ATORVASTATIN CALCIUM 40 MG/1
40 TABLET, FILM COATED ORAL DAILY
Status: CANCELLED | OUTPATIENT
Start: 2019-01-07

## 2019-01-06 RX ORDER — CETIRIZINE HYDROCHLORIDE 10 MG/1
5 TABLET ORAL DAILY
Status: CANCELLED | OUTPATIENT
Start: 2019-01-07

## 2019-01-06 RX ORDER — HYDROCODONE BITARTRATE AND ACETAMINOPHEN 5; 325 MG/1; MG/1
1 TABLET ORAL EVERY 4 HOURS PRN
Status: DISCONTINUED | OUTPATIENT
Start: 2019-01-06 | End: 2019-01-09 | Stop reason: HOSPADM

## 2019-01-06 RX ORDER — ATORVASTATIN CALCIUM 40 MG/1
40 TABLET, FILM COATED ORAL DAILY
Status: DISCONTINUED | OUTPATIENT
Start: 2019-01-07 | End: 2019-01-09 | Stop reason: HOSPADM

## 2019-01-06 RX ORDER — CEPHALEXIN 250 MG/1
250 CAPSULE ORAL 3 TIMES DAILY
Qty: 15 CAPSULE | Refills: 0 | Status: ON HOLD | OUTPATIENT
Start: 2019-01-06 | End: 2019-01-08 | Stop reason: ALTCHOICE

## 2019-01-06 RX ORDER — NICOTINE POLACRILEX 4 MG
15 LOZENGE BUCCAL PRN
Status: CANCELLED | OUTPATIENT
Start: 2019-01-06

## 2019-01-06 RX ORDER — HYDROCODONE BITARTRATE AND ACETAMINOPHEN 5; 325 MG/1; MG/1
1 TABLET ORAL EVERY 4 HOURS PRN
Status: CANCELLED | OUTPATIENT
Start: 2019-01-06

## 2019-01-06 RX ORDER — FUROSEMIDE 40 MG/1
40 TABLET ORAL DAILY
Status: DISCONTINUED | OUTPATIENT
Start: 2019-01-07 | End: 2019-01-07

## 2019-01-06 RX ORDER — CETIRIZINE HYDROCHLORIDE 10 MG/1
5 TABLET ORAL DAILY
Status: DISCONTINUED | OUTPATIENT
Start: 2019-01-07 | End: 2019-01-09 | Stop reason: HOSPADM

## 2019-01-06 RX ORDER — DEXTROSE MONOHYDRATE 25 G/50ML
12.5 INJECTION, SOLUTION INTRAVENOUS PRN
Status: DISCONTINUED | OUTPATIENT
Start: 2019-01-06 | End: 2019-01-09 | Stop reason: HOSPADM

## 2019-01-06 RX ORDER — CARVEDILOL 6.25 MG/1
6.25 TABLET ORAL 2 TIMES DAILY WITH MEALS
Status: DISCONTINUED | OUTPATIENT
Start: 2019-01-07 | End: 2019-01-09 | Stop reason: HOSPADM

## 2019-01-06 RX ORDER — DEXTROSE MONOHYDRATE 50 MG/ML
100 INJECTION, SOLUTION INTRAVENOUS PRN
Status: DISCONTINUED | OUTPATIENT
Start: 2019-01-06 | End: 2019-01-09 | Stop reason: HOSPADM

## 2019-01-06 RX ORDER — ACETAMINOPHEN 325 MG/1
650 TABLET ORAL EVERY 4 HOURS PRN
Status: DISCONTINUED | OUTPATIENT
Start: 2019-01-06 | End: 2019-01-09 | Stop reason: HOSPADM

## 2019-01-06 RX ORDER — DEXTROSE MONOHYDRATE 50 MG/ML
100 INJECTION, SOLUTION INTRAVENOUS PRN
Status: CANCELLED | OUTPATIENT
Start: 2019-01-06

## 2019-01-06 RX ORDER — ACETAMINOPHEN 325 MG/1
650 TABLET ORAL EVERY 4 HOURS PRN
Status: DISCONTINUED | OUTPATIENT
Start: 2019-01-06 | End: 2019-01-06

## 2019-01-06 RX ADMIN — LINAGLIPTIN 5 MG: 5 TABLET, FILM COATED ORAL at 09:50

## 2019-01-06 RX ADMIN — HYDROCODONE BITARTRATE AND ACETAMINOPHEN 1 TABLET: 5; 325 TABLET ORAL at 00:28

## 2019-01-06 RX ADMIN — Medication 150 MG: at 09:50

## 2019-01-06 RX ADMIN — ATORVASTATIN CALCIUM 40 MG: 40 TABLET, FILM COATED ORAL at 09:50

## 2019-01-06 RX ADMIN — AMLODIPINE BESYLATE 10 MG: 10 TABLET ORAL at 09:51

## 2019-01-06 RX ADMIN — CETIRIZINE HYDROCHLORIDE 5 MG: 10 TABLET, FILM COATED ORAL at 09:50

## 2019-01-06 RX ADMIN — DIGOXIN 125 MCG: 125 TABLET ORAL at 09:50

## 2019-01-06 RX ADMIN — GABAPENTIN 100 MG: 100 CAPSULE ORAL at 13:24

## 2019-01-06 RX ADMIN — Medication 150 MG: at 17:35

## 2019-01-06 RX ADMIN — CARVEDILOL 6.25 MG: 6.25 TABLET, FILM COATED ORAL at 17:35

## 2019-01-06 RX ADMIN — RIVAROXABAN 15 MG: 15 TABLET, FILM COATED ORAL at 17:35

## 2019-01-06 RX ADMIN — CEFEPIME HYDROCHLORIDE 1 G: 1 INJECTION, POWDER, FOR SOLUTION INTRAMUSCULAR; INTRAVENOUS at 13:24

## 2019-01-06 RX ADMIN — GABAPENTIN 100 MG: 100 CAPSULE ORAL at 09:50

## 2019-01-06 RX ADMIN — SODIUM CHLORIDE, PRESERVATIVE FREE 10 ML: 5 INJECTION INTRAVENOUS at 09:52

## 2019-01-06 RX ADMIN — ASPIRIN 81 MG 81 MG: 81 TABLET ORAL at 09:50

## 2019-01-06 RX ADMIN — CEPHALEXIN 500 MG: 500 CAPSULE ORAL at 23:41

## 2019-01-06 RX ADMIN — CARVEDILOL 6.25 MG: 6.25 TABLET, FILM COATED ORAL at 09:51

## 2019-01-06 RX ADMIN — FUROSEMIDE 40 MG: 40 TABLET ORAL at 09:51

## 2019-01-06 RX ADMIN — GABAPENTIN 100 MG: 100 CAPSULE ORAL at 21:32

## 2019-01-06 ASSESSMENT — PAIN DESCRIPTION - ONSET: ONSET: AWAKENED FROM SLEEP

## 2019-01-06 ASSESSMENT — PAIN SCALES - GENERAL
PAINLEVEL_OUTOF10: 9
PAINLEVEL_OUTOF10: 0
PAINLEVEL_OUTOF10: 0

## 2019-01-06 ASSESSMENT — PAIN DESCRIPTION - PAIN TYPE: TYPE: ACUTE PAIN

## 2019-01-06 ASSESSMENT — PAIN DESCRIPTION - PROGRESSION: CLINICAL_PROGRESSION: NOT CHANGED

## 2019-01-06 ASSESSMENT — PAIN DESCRIPTION - LOCATION: LOCATION: GENERALIZED

## 2019-01-06 ASSESSMENT — PAIN DESCRIPTION - FREQUENCY: FREQUENCY: INTERMITTENT

## 2019-01-06 ASSESSMENT — PAIN DESCRIPTION - DESCRIPTORS: DESCRIPTORS: ACHING

## 2019-01-07 LAB
ANION GAP SERPL CALCULATED.3IONS-SCNC: 17 MMOL/L (ref 4–16)
BACTERIA: NEGATIVE /HPF
BILIRUBIN URINE: NEGATIVE MG/DL
BLOOD, URINE: NEGATIVE
BUN BLDV-MCNC: 46 MG/DL (ref 6–23)
CALCIUM SERPL-MCNC: 8.4 MG/DL (ref 8.3–10.6)
CHLORIDE BLD-SCNC: 92 MMOL/L (ref 99–110)
CLARITY: CLEAR
CO2: 22 MMOL/L (ref 21–32)
COLOR: YELLOW
CREAT SERPL-MCNC: 2.3 MG/DL (ref 0.6–1.1)
GFR AFRICAN AMERICAN: 24 ML/MIN/1.73M2
GFR NON-AFRICAN AMERICAN: 20 ML/MIN/1.73M2
GLUCOSE BLD-MCNC: 108 MG/DL (ref 70–99)
GLUCOSE BLD-MCNC: 121 MG/DL (ref 70–99)
GLUCOSE BLD-MCNC: 127 MG/DL (ref 70–99)
GLUCOSE BLD-MCNC: 130 MG/DL (ref 70–99)
GLUCOSE BLD-MCNC: 137 MG/DL (ref 70–99)
GLUCOSE, URINE: NEGATIVE MG/DL
HCT VFR BLD CALC: 31.3 % (ref 37–47)
HEMOGLOBIN: 10 GM/DL (ref 12.5–16)
HYALINE CASTS: 0 /LPF
KETONES, URINE: NEGATIVE MG/DL
LEUKOCYTE ESTERASE, URINE: ABNORMAL
MCH RBC QN AUTO: 32.6 PG (ref 27–31)
MCHC RBC AUTO-ENTMCNC: 31.9 % (ref 32–36)
MCV RBC AUTO: 102 FL (ref 78–100)
MUCUS: ABNORMAL HPF
NITRITE URINE, QUANTITATIVE: NEGATIVE
PDW BLD-RTO: 13.2 % (ref 11.7–14.9)
PH, URINE: 5 (ref 5–8)
PLATELET # BLD: 313 K/CU MM (ref 140–440)
PMV BLD AUTO: 9 FL (ref 7.5–11.1)
POTASSIUM SERPL-SCNC: 3.2 MMOL/L (ref 3.5–5.1)
PROTEIN UA: 30 MG/DL
RBC # BLD: 3.07 M/CU MM (ref 4.2–5.4)
RBC URINE: 1 /HPF (ref 0–6)
SODIUM BLD-SCNC: 131 MMOL/L (ref 135–145)
SPECIFIC GRAVITY UA: 1.01 (ref 1–1.03)
SQUAMOUS EPITHELIAL: <1 /HPF
TRICHOMONAS: ABNORMAL /HPF
UROBILINOGEN, URINE: NORMAL MG/DL (ref 0.2–1)
WBC # BLD: 10 K/CU MM (ref 4–10.5)
WBC UA: 2 /HPF (ref 0–5)

## 2019-01-07 PROCEDURE — 82962 GLUCOSE BLOOD TEST: CPT

## 2019-01-07 PROCEDURE — 2580000003 HC RX 258

## 2019-01-07 PROCEDURE — 97150 GROUP THERAPEUTIC PROCEDURES: CPT

## 2019-01-07 PROCEDURE — 6370000000 HC RX 637 (ALT 250 FOR IP): Performed by: HOSPITALIST

## 2019-01-07 PROCEDURE — 97530 THERAPEUTIC ACTIVITIES: CPT

## 2019-01-07 PROCEDURE — 97163 PT EVAL HIGH COMPLEX 45 MIN: CPT

## 2019-01-07 PROCEDURE — 80048 BASIC METABOLIC PNL TOTAL CA: CPT

## 2019-01-07 PROCEDURE — 97535 SELF CARE MNGMENT TRAINING: CPT

## 2019-01-07 PROCEDURE — 99232 SBSQ HOSP IP/OBS MODERATE 35: CPT | Performed by: PHYSICAL MEDICINE & REHABILITATION

## 2019-01-07 PROCEDURE — 85027 COMPLETE CBC AUTOMATED: CPT

## 2019-01-07 PROCEDURE — 97166 OT EVAL MOD COMPLEX 45 MIN: CPT

## 2019-01-07 PROCEDURE — 6370000000 HC RX 637 (ALT 250 FOR IP): Performed by: PHYSICAL MEDICINE & REHABILITATION

## 2019-01-07 PROCEDURE — 87086 URINE CULTURE/COLONY COUNT: CPT

## 2019-01-07 PROCEDURE — 36415 COLL VENOUS BLD VENIPUNCTURE: CPT

## 2019-01-07 PROCEDURE — 97116 GAIT TRAINING THERAPY: CPT

## 2019-01-07 PROCEDURE — 87040 BLOOD CULTURE FOR BACTERIA: CPT

## 2019-01-07 PROCEDURE — 1280000000 HC REHAB R&B

## 2019-01-07 PROCEDURE — 81001 URINALYSIS AUTO W/SCOPE: CPT

## 2019-01-07 RX ORDER — POTASSIUM CHLORIDE 20 MEQ/1
20 TABLET, EXTENDED RELEASE ORAL 3 TIMES DAILY
Status: DISCONTINUED | OUTPATIENT
Start: 2019-01-07 | End: 2019-01-09 | Stop reason: HOSPADM

## 2019-01-07 RX ORDER — ACETAMINOPHEN 80 MG
TABLET,CHEWABLE ORAL
Status: COMPLETED
Start: 2019-01-07 | End: 2019-01-07

## 2019-01-07 RX ORDER — 0.9 % SODIUM CHLORIDE 0.9 %
1000 INTRAVENOUS SOLUTION INTRAVENOUS ONCE
Status: COMPLETED | OUTPATIENT
Start: 2019-01-07 | End: 2019-01-08

## 2019-01-07 RX ORDER — DIPHENOXYLATE HYDROCHLORIDE AND ATROPINE SULFATE 2.5; .025 MG/1; MG/1
1 TABLET ORAL ONCE
Status: COMPLETED | OUTPATIENT
Start: 2019-01-07 | End: 2019-01-07

## 2019-01-07 RX ORDER — SODIUM CHLORIDE 9 MG/ML
INJECTION, SOLUTION INTRAVENOUS
Status: COMPLETED
Start: 2019-01-07 | End: 2019-01-08

## 2019-01-07 RX ADMIN — CARVEDILOL 6.25 MG: 6.25 TABLET, FILM COATED ORAL at 17:23

## 2019-01-07 RX ADMIN — CEPHALEXIN 500 MG: 500 CAPSULE ORAL at 21:48

## 2019-01-07 RX ADMIN — ATORVASTATIN CALCIUM 40 MG: 40 TABLET, FILM COATED ORAL at 09:41

## 2019-01-07 RX ADMIN — SODIUM CHLORIDE 1000 ML: 9 INJECTION, SOLUTION INTRAVENOUS at 14:48

## 2019-01-07 RX ADMIN — CEPHALEXIN 500 MG: 500 CAPSULE ORAL at 14:40

## 2019-01-07 RX ADMIN — AMLODIPINE BESYLATE 10 MG: 10 TABLET ORAL at 09:43

## 2019-01-07 RX ADMIN — HYDROCODONE BITARTRATE AND ACETAMINOPHEN 1 TABLET: 5; 325 TABLET ORAL at 09:40

## 2019-01-07 RX ADMIN — GABAPENTIN 100 MG: 100 CAPSULE ORAL at 09:41

## 2019-01-07 RX ADMIN — Medication 1000 ML: at 14:48

## 2019-01-07 RX ADMIN — LINAGLIPTIN 5 MG: 5 TABLET, FILM COATED ORAL at 09:44

## 2019-01-07 RX ADMIN — CEPHALEXIN 500 MG: 500 CAPSULE ORAL at 04:34

## 2019-01-07 RX ADMIN — POTASSIUM CHLORIDE 20 MEQ: 20 TABLET, EXTENDED RELEASE ORAL at 14:59

## 2019-01-07 RX ADMIN — FUROSEMIDE 40 MG: 40 TABLET ORAL at 09:41

## 2019-01-07 RX ADMIN — GABAPENTIN 100 MG: 100 CAPSULE ORAL at 14:40

## 2019-01-07 RX ADMIN — Medication 150 MG: at 17:23

## 2019-01-07 RX ADMIN — Medication 150 MG: at 09:41

## 2019-01-07 RX ADMIN — ASPIRIN 81 MG 81 MG: 81 TABLET ORAL at 09:43

## 2019-01-07 RX ADMIN — DIGOXIN 125 MCG: 125 TABLET ORAL at 09:41

## 2019-01-07 RX ADMIN — DIPHENOXYLATE HYDROCHLORIDE AND ATROPINE SULFATE 1 TABLET: 2.5; .025 TABLET ORAL at 14:56

## 2019-01-07 RX ADMIN — ACETAMINOPHEN 650 MG: 325 TABLET ORAL at 04:34

## 2019-01-07 RX ADMIN — Medication: at 11:41

## 2019-01-07 RX ADMIN — CETIRIZINE HYDROCHLORIDE 5 MG: 10 TABLET, FILM COATED ORAL at 09:41

## 2019-01-07 RX ADMIN — RIVAROXABAN 15 MG: 15 TABLET, FILM COATED ORAL at 17:23

## 2019-01-07 RX ADMIN — CARVEDILOL 6.25 MG: 6.25 TABLET, FILM COATED ORAL at 09:41

## 2019-01-07 ASSESSMENT — PAIN SCALES - GENERAL
PAINLEVEL_OUTOF10: 0
PAINLEVEL_OUTOF10: 4
PAINLEVEL_OUTOF10: 4

## 2019-01-07 ASSESSMENT — PAIN DESCRIPTION - LOCATION: LOCATION: GENERALIZED

## 2019-01-07 ASSESSMENT — PAIN DESCRIPTION - DESCRIPTORS: DESCRIPTORS: ACHING

## 2019-01-08 ENCOUNTER — APPOINTMENT (OUTPATIENT)
Dept: GENERAL RADIOLOGY | Age: 84
DRG: 872 | End: 2019-01-08
Attending: PHYSICAL MEDICINE & REHABILITATION
Payer: MEDICARE

## 2019-01-08 ENCOUNTER — HOSPITAL ENCOUNTER (INPATIENT)
Age: 84
LOS: 8 days | Discharge: NURSING, REHAB OR LONG-TERM CARE | DRG: 872 | End: 2019-01-16
Attending: HOSPITALIST | Admitting: PHYSICAL MEDICINE & REHABILITATION
Payer: MEDICARE

## 2019-01-08 VITALS
HEART RATE: 68 BPM | HEIGHT: 65 IN | WEIGHT: 129.9 LBS | DIASTOLIC BLOOD PRESSURE: 65 MMHG | RESPIRATION RATE: 20 BRPM | OXYGEN SATURATION: 89 % | BODY MASS INDEX: 21.64 KG/M2 | SYSTOLIC BLOOD PRESSURE: 140 MMHG | TEMPERATURE: 99.3 F

## 2019-01-08 PROBLEM — A41.9 SEPSIS (HCC): Status: ACTIVE | Noted: 2019-01-08

## 2019-01-08 LAB
ANION GAP SERPL CALCULATED.3IONS-SCNC: 14 MMOL/L (ref 4–16)
BUN BLDV-MCNC: 47 MG/DL (ref 6–23)
CALCIUM SERPL-MCNC: 7.8 MG/DL (ref 8.3–10.6)
CHLORIDE BLD-SCNC: 97 MMOL/L (ref 99–110)
CO2: 21 MMOL/L (ref 21–32)
CREAT SERPL-MCNC: 2.2 MG/DL (ref 0.6–1.1)
DIGOXIN LEVEL: 2 NG/ML (ref 0.8–2)
EKG ATRIAL RATE: 76 BPM
EKG DIAGNOSIS: NORMAL
EKG P AXIS: 73 DEGREES
EKG P-R INTERVAL: 234 MS
EKG Q-T INTERVAL: 334 MS
EKG QRS DURATION: 82 MS
EKG QTC CALCULATION (BAZETT): 375 MS
EKG R AXIS: -34 DEGREES
EKG T AXIS: 137 DEGREES
EKG VENTRICULAR RATE: 76 BPM
GFR AFRICAN AMERICAN: 25 ML/MIN/1.73M2
GFR NON-AFRICAN AMERICAN: 21 ML/MIN/1.73M2
GLUCOSE BLD-MCNC: 104 MG/DL (ref 70–99)
GLUCOSE BLD-MCNC: 134 MG/DL (ref 70–99)
GLUCOSE BLD-MCNC: 136 MG/DL (ref 70–99)
GLUCOSE BLD-MCNC: 146 MG/DL (ref 70–99)
GLUCOSE BLD-MCNC: 91 MG/DL (ref 70–99)
HCT VFR BLD CALC: 24.1 % (ref 37–47)
HEMOGLOBIN: 8 GM/DL (ref 12.5–16)
MCH RBC QN AUTO: 32.7 PG (ref 27–31)
MCHC RBC AUTO-ENTMCNC: 33.2 % (ref 32–36)
MCV RBC AUTO: 98.4 FL (ref 78–100)
PDW BLD-RTO: 13.4 % (ref 11.7–14.9)
PLATELET # BLD: 287 K/CU MM (ref 140–440)
PMV BLD AUTO: 8.8 FL (ref 7.5–11.1)
POTASSIUM SERPL-SCNC: 3.5 MMOL/L (ref 3.5–5.1)
RBC # BLD: 2.45 M/CU MM (ref 4.2–5.4)
SODIUM BLD-SCNC: 132 MMOL/L (ref 135–145)
WBC # BLD: 17 K/CU MM (ref 4–10.5)

## 2019-01-08 PROCEDURE — 6370000000 HC RX 637 (ALT 250 FOR IP): Performed by: PHYSICAL MEDICINE & REHABILITATION

## 2019-01-08 PROCEDURE — 97110 THERAPEUTIC EXERCISES: CPT

## 2019-01-08 PROCEDURE — 97150 GROUP THERAPEUTIC PROCEDURES: CPT

## 2019-01-08 PROCEDURE — 99231 SBSQ HOSP IP/OBS SF/LOW 25: CPT | Performed by: PHYSICAL MEDICINE & REHABILITATION

## 2019-01-08 PROCEDURE — 93226 XTRNL ECG REC<48 HR SCAN A/R: CPT

## 2019-01-08 PROCEDURE — 80162 ASSAY OF DIGOXIN TOTAL: CPT

## 2019-01-08 PROCEDURE — 94150 VITAL CAPACITY TEST: CPT

## 2019-01-08 PROCEDURE — 71045 X-RAY EXAM CHEST 1 VIEW: CPT

## 2019-01-08 PROCEDURE — 97116 GAIT TRAINING THERAPY: CPT

## 2019-01-08 PROCEDURE — 85027 COMPLETE CBC AUTOMATED: CPT

## 2019-01-08 PROCEDURE — 2140000000 HC CCU INTERMEDIATE R&B

## 2019-01-08 PROCEDURE — 97530 THERAPEUTIC ACTIVITIES: CPT

## 2019-01-08 PROCEDURE — 2580000003 HC RX 258: Performed by: HOSPITALIST

## 2019-01-08 PROCEDURE — 6370000000 HC RX 637 (ALT 250 FOR IP): Performed by: HOSPITALIST

## 2019-01-08 PROCEDURE — C9113 INJ PANTOPRAZOLE SODIUM, VIA: HCPCS | Performed by: HOSPITALIST

## 2019-01-08 PROCEDURE — 94761 N-INVAS EAR/PLS OXIMETRY MLT: CPT

## 2019-01-08 PROCEDURE — 82962 GLUCOSE BLOOD TEST: CPT

## 2019-01-08 PROCEDURE — 6360000002 HC RX W HCPCS: Performed by: HOSPITALIST

## 2019-01-08 PROCEDURE — 80048 BASIC METABOLIC PNL TOTAL CA: CPT

## 2019-01-08 PROCEDURE — 36415 COLL VENOUS BLD VENIPUNCTURE: CPT

## 2019-01-08 RX ORDER — DIGOXIN 125 MCG
125 TABLET ORAL EVERY OTHER DAY
Status: DISCONTINUED | OUTPATIENT
Start: 2019-01-08 | End: 2019-01-08

## 2019-01-08 RX ORDER — BENZONATATE 200 MG/1
200 CAPSULE ORAL 3 TIMES DAILY PRN
Status: ON HOLD | COMMUNITY
End: 2019-01-21 | Stop reason: CLARIF

## 2019-01-08 RX ORDER — CEFDINIR 300 MG/1
300 CAPSULE ORAL 2 TIMES DAILY
Status: ON HOLD | COMMUNITY
End: 2019-01-21 | Stop reason: CLARIF

## 2019-01-08 RX ORDER — ASPIRIN 325 MG
325 TABLET ORAL DAILY
Status: ON HOLD | COMMUNITY
End: 2019-01-21 | Stop reason: CLARIF

## 2019-01-08 RX ORDER — PANTOPRAZOLE SODIUM 40 MG/10ML
80 INJECTION, POWDER, LYOPHILIZED, FOR SOLUTION INTRAVENOUS ONCE
Status: DISCONTINUED | OUTPATIENT
Start: 2019-01-08 | End: 2019-01-08

## 2019-01-08 RX ORDER — DIGOXIN 125 MCG
125 TABLET ORAL EVERY OTHER DAY
Status: DISCONTINUED | OUTPATIENT
Start: 2019-01-09 | End: 2019-01-09 | Stop reason: HOSPADM

## 2019-01-08 RX ADMIN — POTASSIUM CHLORIDE 20 MEQ: 20 TABLET, EXTENDED RELEASE ORAL at 20:14

## 2019-01-08 RX ADMIN — GABAPENTIN 100 MG: 100 CAPSULE ORAL at 20:14

## 2019-01-08 RX ADMIN — POTASSIUM CHLORIDE 20 MEQ: 20 TABLET, EXTENDED RELEASE ORAL at 10:44

## 2019-01-08 RX ADMIN — CETIRIZINE HYDROCHLORIDE 5 MG: 10 TABLET, FILM COATED ORAL at 10:44

## 2019-01-08 RX ADMIN — GABAPENTIN 100 MG: 100 CAPSULE ORAL at 10:44

## 2019-01-08 RX ADMIN — AMLODIPINE BESYLATE 10 MG: 10 TABLET ORAL at 10:43

## 2019-01-08 RX ADMIN — CEPHALEXIN 500 MG: 500 CAPSULE ORAL at 05:45

## 2019-01-08 RX ADMIN — SODIUM CHLORIDE 80 MG: 9 INJECTION, SOLUTION INTRAVENOUS at 23:15

## 2019-01-08 RX ADMIN — LINAGLIPTIN 5 MG: 5 TABLET, FILM COATED ORAL at 10:43

## 2019-01-08 RX ADMIN — ACETAMINOPHEN 650 MG: 325 TABLET ORAL at 20:13

## 2019-01-08 RX ADMIN — POTASSIUM CHLORIDE 20 MEQ: 20 TABLET, EXTENDED RELEASE ORAL at 15:47

## 2019-01-08 RX ADMIN — GABAPENTIN 100 MG: 100 CAPSULE ORAL at 15:47

## 2019-01-08 RX ADMIN — CARVEDILOL 6.25 MG: 6.25 TABLET, FILM COATED ORAL at 10:43

## 2019-01-08 RX ADMIN — ATORVASTATIN CALCIUM 40 MG: 40 TABLET, FILM COATED ORAL at 10:43

## 2019-01-08 RX ADMIN — NYSTATIN 500000 UNITS: 100000 SUSPENSION ORAL at 18:06

## 2019-01-08 RX ADMIN — ASPIRIN 81 MG 81 MG: 81 TABLET ORAL at 10:45

## 2019-01-08 RX ADMIN — CEPHALEXIN 500 MG: 500 CAPSULE ORAL at 20:14

## 2019-01-08 RX ADMIN — Medication 150 MG: at 10:45

## 2019-01-08 RX ADMIN — RIVAROXABAN 15 MG: 15 TABLET, FILM COATED ORAL at 18:04

## 2019-01-08 RX ADMIN — CARVEDILOL 6.25 MG: 6.25 TABLET, FILM COATED ORAL at 18:05

## 2019-01-08 RX ADMIN — CEPHALEXIN 500 MG: 500 CAPSULE ORAL at 15:47

## 2019-01-08 RX ADMIN — Medication 150 MG: at 18:05

## 2019-01-09 ENCOUNTER — APPOINTMENT (OUTPATIENT)
Dept: CT IMAGING | Age: 84
DRG: 872 | End: 2019-01-09
Attending: HOSPITALIST
Payer: MEDICARE

## 2019-01-09 LAB
ANION GAP SERPL CALCULATED.3IONS-SCNC: 16 MMOL/L (ref 4–16)
BACTERIA: NEGATIVE /HPF
BILIRUBIN URINE: NEGATIVE MG/DL
BLOOD, URINE: ABNORMAL
BUN BLDV-MCNC: 53 MG/DL (ref 6–23)
CALCIUM SERPL-MCNC: 8 MG/DL (ref 8.3–10.6)
CHLORIDE BLD-SCNC: 101 MMOL/L (ref 99–110)
CLARITY: ABNORMAL
CO2: 16 MMOL/L (ref 21–32)
COLOR: YELLOW
CREAT SERPL-MCNC: 2.6 MG/DL (ref 0.6–1.1)
CULTURE: NORMAL
GFR AFRICAN AMERICAN: 21 ML/MIN/1.73M2
GFR NON-AFRICAN AMERICAN: 17 ML/MIN/1.73M2
GLUCOSE BLD-MCNC: 116 MG/DL (ref 70–99)
GLUCOSE BLD-MCNC: 120 MG/DL (ref 70–99)
GLUCOSE BLD-MCNC: 124 MG/DL (ref 70–99)
GLUCOSE BLD-MCNC: 131 MG/DL (ref 70–99)
GLUCOSE BLD-MCNC: 140 MG/DL (ref 70–99)
GLUCOSE, URINE: NEGATIVE MG/DL
GRANULAR CASTS: 14 /LPF
HCT VFR BLD CALC: 27.6 % (ref 37–47)
HCT VFR BLD CALC: 30.7 % (ref 37–47)
HEMOGLOBIN: 8.7 GM/DL (ref 12.5–16)
HEMOGLOBIN: 8.7 GM/DL (ref 12.5–16)
KETONES, URINE: NEGATIVE MG/DL
LACTATE: 0.9 MMOL/L (ref 0.4–2)
LEUKOCYTE ESTERASE, URINE: NEGATIVE
Lab: NORMAL
MCH RBC QN AUTO: 32.3 PG (ref 27–31)
MCHC RBC AUTO-ENTMCNC: 28.3 % (ref 32–36)
MCV RBC AUTO: 114.1 FL (ref 78–100)
MUCUS: ABNORMAL HPF
NITRITE URINE, QUANTITATIVE: NEGATIVE
PDW BLD-RTO: 13.8 % (ref 11.7–14.9)
PH, URINE: 5 (ref 5–8)
PLATELET # BLD: 283 K/CU MM (ref 140–440)
PMV BLD AUTO: 9.1 FL (ref 7.5–11.1)
POTASSIUM SERPL-SCNC: 4.1 MMOL/L (ref 3.5–5.1)
PROTEIN UA: 100 MG/DL
RBC # BLD: 2.69 M/CU MM (ref 4.2–5.4)
RBC URINE: 1 /HPF (ref 0–6)
REPORT STATUS: NORMAL
SODIUM BLD-SCNC: 133 MMOL/L (ref 135–145)
SPECIFIC GRAVITY UA: 1.01 (ref 1–1.03)
SPECIMEN: NORMAL
TRICHOMONAS: ABNORMAL /HPF
UROBILINOGEN, URINE: NORMAL MG/DL (ref 0.2–1)
WBC # BLD: 17 K/CU MM (ref 4–10.5)
WBC UA: 3 /HPF (ref 0–5)

## 2019-01-09 PROCEDURE — 36415 COLL VENOUS BLD VENIPUNCTURE: CPT

## 2019-01-09 PROCEDURE — 87040 BLOOD CULTURE FOR BACTERIA: CPT

## 2019-01-09 PROCEDURE — 6370000000 HC RX 637 (ALT 250 FOR IP): Performed by: HOSPITALIST

## 2019-01-09 PROCEDURE — 97530 THERAPEUTIC ACTIVITIES: CPT

## 2019-01-09 PROCEDURE — 2580000003 HC RX 258: Performed by: HOSPITALIST

## 2019-01-09 PROCEDURE — 2580000003 HC RX 258: Performed by: INTERNAL MEDICINE

## 2019-01-09 PROCEDURE — 85027 COMPLETE CBC AUTOMATED: CPT

## 2019-01-09 PROCEDURE — 80048 BASIC METABOLIC PNL TOTAL CA: CPT

## 2019-01-09 PROCEDURE — 87086 URINE CULTURE/COLONY COUNT: CPT

## 2019-01-09 PROCEDURE — 6370000000 HC RX 637 (ALT 250 FOR IP)

## 2019-01-09 PROCEDURE — 97167 OT EVAL HIGH COMPLEX 60 MIN: CPT

## 2019-01-09 PROCEDURE — C9113 INJ PANTOPRAZOLE SODIUM, VIA: HCPCS | Performed by: HOSPITALIST

## 2019-01-09 PROCEDURE — 85014 HEMATOCRIT: CPT

## 2019-01-09 PROCEDURE — 87046 STOOL CULTR AEROBIC BACT EA: CPT

## 2019-01-09 PROCEDURE — C9113 INJ PANTOPRAZOLE SODIUM, VIA: HCPCS | Performed by: NURSE PRACTITIONER

## 2019-01-09 PROCEDURE — 87328 CRYPTOSPORIDIUM AG IA: CPT

## 2019-01-09 PROCEDURE — 83605 ASSAY OF LACTIC ACID: CPT

## 2019-01-09 PROCEDURE — 82962 GLUCOSE BLOOD TEST: CPT

## 2019-01-09 PROCEDURE — 2500000003 HC RX 250 WO HCPCS: Performed by: INTERNAL MEDICINE

## 2019-01-09 PROCEDURE — G8987 SELF CARE CURRENT STATUS: HCPCS

## 2019-01-09 PROCEDURE — 97535 SELF CARE MNGMENT TRAINING: CPT

## 2019-01-09 PROCEDURE — G8988 SELF CARE GOAL STATUS: HCPCS

## 2019-01-09 PROCEDURE — 87324 CLOSTRIDIUM AG IA: CPT

## 2019-01-09 PROCEDURE — 94761 N-INVAS EAR/PLS OXIMETRY MLT: CPT

## 2019-01-09 PROCEDURE — 97162 PT EVAL MOD COMPLEX 30 MIN: CPT

## 2019-01-09 PROCEDURE — 2140000000 HC CCU INTERMEDIATE R&B

## 2019-01-09 PROCEDURE — 85018 HEMOGLOBIN: CPT

## 2019-01-09 PROCEDURE — 6360000002 HC RX W HCPCS: Performed by: HOSPITALIST

## 2019-01-09 PROCEDURE — 87329 GIARDIA AG IA: CPT

## 2019-01-09 PROCEDURE — 74176 CT ABD & PELVIS W/O CONTRAST: CPT

## 2019-01-09 PROCEDURE — 6360000002 HC RX W HCPCS: Performed by: INTERNAL MEDICINE

## 2019-01-09 PROCEDURE — 6360000002 HC RX W HCPCS: Performed by: NURSE PRACTITIONER

## 2019-01-09 PROCEDURE — 81001 URINALYSIS AUTO W/SCOPE: CPT

## 2019-01-09 RX ORDER — DIGOXIN 125 MCG
125 TABLET ORAL DAILY
Status: DISCONTINUED | OUTPATIENT
Start: 2019-01-09 | End: 2019-01-10

## 2019-01-09 RX ORDER — SODIUM CHLORIDE 0.9 % (FLUSH) 0.9 %
10 SYRINGE (ML) INJECTION PRN
Status: DISCONTINUED | OUTPATIENT
Start: 2019-01-09 | End: 2019-01-16 | Stop reason: HOSPADM

## 2019-01-09 RX ORDER — CARVEDILOL 6.25 MG/1
6.25 TABLET ORAL 2 TIMES DAILY WITH MEALS
Status: DISCONTINUED | OUTPATIENT
Start: 2019-01-09 | End: 2019-01-10

## 2019-01-09 RX ORDER — DEXTROSE MONOHYDRATE 25 G/50ML
12.5 INJECTION, SOLUTION INTRAVENOUS PRN
Status: DISCONTINUED | OUTPATIENT
Start: 2019-01-09 | End: 2019-01-16 | Stop reason: HOSPADM

## 2019-01-09 RX ORDER — SODIUM CHLORIDE 9 MG/ML
INJECTION, SOLUTION INTRAVENOUS CONTINUOUS
Status: DISCONTINUED | OUTPATIENT
Start: 2019-01-09 | End: 2019-01-09

## 2019-01-09 RX ORDER — SODIUM CHLORIDE 0.9 % (FLUSH) 0.9 %
10 SYRINGE (ML) INJECTION EVERY 12 HOURS SCHEDULED
Status: DISCONTINUED | OUTPATIENT
Start: 2019-01-09 | End: 2019-01-16 | Stop reason: HOSPADM

## 2019-01-09 RX ORDER — CIPROFLOXACIN 2 MG/ML
400 INJECTION, SOLUTION INTRAVENOUS EVERY 24 HOURS
Status: DISCONTINUED | OUTPATIENT
Start: 2019-01-09 | End: 2019-01-10

## 2019-01-09 RX ORDER — METRONIDAZOLE 250 MG/1
500 TABLET ORAL EVERY 8 HOURS SCHEDULED
Status: DISCONTINUED | OUTPATIENT
Start: 2019-01-09 | End: 2019-01-09

## 2019-01-09 RX ORDER — ACETAMINOPHEN 325 MG/1
TABLET ORAL
Status: COMPLETED
Start: 2019-01-09 | End: 2019-01-09

## 2019-01-09 RX ORDER — DEXTROSE MONOHYDRATE 50 MG/ML
100 INJECTION, SOLUTION INTRAVENOUS PRN
Status: DISCONTINUED | OUTPATIENT
Start: 2019-01-09 | End: 2019-01-16 | Stop reason: HOSPADM

## 2019-01-09 RX ORDER — CEPHALEXIN 250 MG/1
250 CAPSULE ORAL EVERY 8 HOURS SCHEDULED
Status: DISCONTINUED | OUTPATIENT
Start: 2019-01-09 | End: 2019-01-09

## 2019-01-09 RX ORDER — NICOTINE POLACRILEX 4 MG
15 LOZENGE BUCCAL PRN
Status: DISCONTINUED | OUTPATIENT
Start: 2019-01-09 | End: 2019-01-16 | Stop reason: HOSPADM

## 2019-01-09 RX ORDER — CEPHALEXIN 250 MG/1
500 CAPSULE ORAL EVERY 8 HOURS SCHEDULED
Status: DISCONTINUED | OUTPATIENT
Start: 2019-01-09 | End: 2019-01-09

## 2019-01-09 RX ORDER — ONDANSETRON 2 MG/ML
4 INJECTION INTRAMUSCULAR; INTRAVENOUS EVERY 6 HOURS PRN
Status: DISCONTINUED | OUTPATIENT
Start: 2019-01-09 | End: 2019-01-13

## 2019-01-09 RX ORDER — PANTOPRAZOLE SODIUM 40 MG/10ML
40 INJECTION, POWDER, LYOPHILIZED, FOR SOLUTION INTRAVENOUS DAILY
Status: DISCONTINUED | OUTPATIENT
Start: 2019-01-09 | End: 2019-01-15

## 2019-01-09 RX ORDER — ACETAMINOPHEN 325 MG/1
650 TABLET ORAL EVERY 4 HOURS PRN
Status: DISCONTINUED | OUTPATIENT
Start: 2019-01-09 | End: 2019-01-16 | Stop reason: HOSPADM

## 2019-01-09 RX ADMIN — SODIUM BICARBONATE: 84 INJECTION, SOLUTION INTRAVENOUS at 22:48

## 2019-01-09 RX ADMIN — SODIUM CHLORIDE, PRESERVATIVE FREE 10 ML: 5 INJECTION INTRAVENOUS at 12:35

## 2019-01-09 RX ADMIN — METRONIDAZOLE 500 MG: 500 INJECTION, SOLUTION INTRAVENOUS at 13:59

## 2019-01-09 RX ADMIN — SODIUM CHLORIDE: 9 INJECTION, SOLUTION INTRAVENOUS at 03:56

## 2019-01-09 RX ADMIN — CIPROFLOXACIN 400 MG: 2 INJECTION, SOLUTION INTRAVENOUS at 12:35

## 2019-01-09 RX ADMIN — CARVEDILOL 6.25 MG: 6.25 TABLET, FILM COATED ORAL at 19:02

## 2019-01-09 RX ADMIN — ACETAMINOPHEN 650 MG: 325 TABLET ORAL at 06:37

## 2019-01-09 RX ADMIN — METRONIDAZOLE 500 MG: 500 INJECTION, SOLUTION INTRAVENOUS at 21:30

## 2019-01-09 RX ADMIN — ACETAMINOPHEN 650 MG: 325 TABLET ORAL at 21:30

## 2019-01-09 RX ADMIN — PANTOPRAZOLE SODIUM 40 MG: 40 INJECTION, POWDER, FOR SOLUTION INTRAVENOUS at 12:34

## 2019-01-09 RX ADMIN — CEPHALEXIN 250 MG: 250 CAPSULE ORAL at 06:26

## 2019-01-09 RX ADMIN — SODIUM CHLORIDE, PRESERVATIVE FREE 10 ML: 5 INJECTION INTRAVENOUS at 21:30

## 2019-01-09 RX ADMIN — METRONIDAZOLE 500 MG: 250 TABLET ORAL at 06:26

## 2019-01-09 RX ADMIN — SODIUM CHLORIDE 8 MG/HR: 9 INJECTION, SOLUTION INTRAVENOUS at 03:57

## 2019-01-09 ASSESSMENT — PAIN SCALES - GENERAL
PAINLEVEL_OUTOF10: 5
PAINLEVEL_OUTOF10: 0

## 2019-01-09 ASSESSMENT — PAIN DESCRIPTION - LOCATION: LOCATION: ABDOMEN

## 2019-01-09 ASSESSMENT — PAIN DESCRIPTION - DESCRIPTORS: DESCRIPTORS: ACHING

## 2019-01-09 ASSESSMENT — PAIN DESCRIPTION - FREQUENCY: FREQUENCY: CONTINUOUS

## 2019-01-09 ASSESSMENT — PAIN DESCRIPTION - PAIN TYPE: TYPE: ACUTE PAIN

## 2019-01-10 LAB
ALBUMIN SERPL-MCNC: 2.7 GM/DL (ref 3.4–5)
ALP BLD-CCNC: 74 IU/L (ref 40–128)
ALT SERPL-CCNC: 17 U/L (ref 10–40)
ANION GAP SERPL CALCULATED.3IONS-SCNC: 15 MMOL/L (ref 4–16)
AST SERPL-CCNC: 30 IU/L (ref 15–37)
BILIRUB SERPL-MCNC: 0.3 MG/DL (ref 0–1)
BUN BLDV-MCNC: 56 MG/DL (ref 6–23)
CALCIUM SERPL-MCNC: 7.8 MG/DL (ref 8.3–10.6)
CHLORIDE BLD-SCNC: 99 MMOL/L (ref 99–110)
CLOSTRIDIUM DIFFICILE, PCR: NORMAL
CO2: 19 MMOL/L (ref 21–32)
CREAT SERPL-MCNC: 2.8 MG/DL (ref 0.6–1.1)
CRYPTOSPORIDIUM ANTIGEN: NEGATIVE
CULTURE: NORMAL
EKG ATRIAL RATE: 60 BPM
EKG ATRIAL RATE: 83 BPM
EKG DIAGNOSIS: NORMAL
EKG DIAGNOSIS: NORMAL
EKG Q-T INTERVAL: 366 MS
EKG Q-T INTERVAL: 398 MS
EKG QRS DURATION: 90 MS
EKG QRS DURATION: 92 MS
EKG QTC CALCULATION (BAZETT): 394 MS
EKG QTC CALCULATION (BAZETT): 450 MS
EKG R AXIS: -32 DEGREES
EKG R AXIS: -41 DEGREES
EKG T AXIS: 103 DEGREES
EKG T AXIS: 118 DEGREES
EKG VENTRICULAR RATE: 59 BPM
EKG VENTRICULAR RATE: 91 BPM
GFR AFRICAN AMERICAN: 19 ML/MIN/1.73M2
GFR NON-AFRICAN AMERICAN: 16 ML/MIN/1.73M2
GIARDIA ANTIGEN STOOL: NEGATIVE
GIARDIA ANTIGEN STOOL: NEGATIVE
GLUCOSE BLD-MCNC: 132 MG/DL (ref 70–99)
GLUCOSE BLD-MCNC: 135 MG/DL (ref 70–99)
GLUCOSE BLD-MCNC: 138 MG/DL (ref 70–99)
GLUCOSE BLD-MCNC: 156 MG/DL (ref 70–99)
GLUCOSE BLD-MCNC: 157 MG/DL (ref 70–99)
HCT VFR BLD CALC: 24 % (ref 37–47)
HCT VFR BLD CALC: 24.5 % (ref 37–47)
HCT VFR BLD CALC: 24.6 % (ref 37–47)
HCT VFR BLD CALC: 24.7 % (ref 37–47)
HCT VFR BLD CALC: 26 % (ref 37–47)
HEMOGLOBIN: 7.8 GM/DL (ref 12.5–16)
HEMOGLOBIN: 7.9 GM/DL (ref 12.5–16)
HEMOGLOBIN: 7.9 GM/DL (ref 12.5–16)
HEMOGLOBIN: 8 GM/DL (ref 12.5–16)
HEMOGLOBIN: 8.2 GM/DL (ref 12.5–16)
LV EF: 50 %
LVEF MODALITY: NORMAL
Lab: NORMAL
MAGNESIUM: 1.7 MG/DL (ref 1.8–2.4)
MCH RBC QN AUTO: 32.1 PG (ref 27–31)
MCHC RBC AUTO-ENTMCNC: 31.8 % (ref 32–36)
MCV RBC AUTO: 100.8 FL (ref 78–100)
PDW BLD-RTO: 13.8 % (ref 11.7–14.9)
PLATELET # BLD: 319 K/CU MM (ref 140–440)
PMV BLD AUTO: 9.4 FL (ref 7.5–11.1)
POTASSIUM SERPL-SCNC: 3.6 MMOL/L (ref 3.5–5.1)
RBC # BLD: 2.43 M/CU MM (ref 4.2–5.4)
REPORT STATUS: NORMAL
SODIUM BLD-SCNC: 133 MMOL/L (ref 135–145)
SPECIMEN: NORMAL
TOTAL PROTEIN: 4.9 GM/DL (ref 6.4–8.2)
TROPONIN T: 0.03 NG/ML
WBC # BLD: 19.7 K/CU MM (ref 4–10.5)

## 2019-01-10 PROCEDURE — 36415 COLL VENOUS BLD VENIPUNCTURE: CPT

## 2019-01-10 PROCEDURE — 6360000002 HC RX W HCPCS: Performed by: INTERNAL MEDICINE

## 2019-01-10 PROCEDURE — 2140000000 HC CCU INTERMEDIATE R&B

## 2019-01-10 PROCEDURE — 85018 HEMOGLOBIN: CPT

## 2019-01-10 PROCEDURE — 94761 N-INVAS EAR/PLS OXIMETRY MLT: CPT

## 2019-01-10 PROCEDURE — 2580000003 HC RX 258: Performed by: INTERNAL MEDICINE

## 2019-01-10 PROCEDURE — 6360000002 HC RX W HCPCS: Performed by: NURSE PRACTITIONER

## 2019-01-10 PROCEDURE — 6370000000 HC RX 637 (ALT 250 FOR IP): Performed by: HOSPITALIST

## 2019-01-10 PROCEDURE — 82962 GLUCOSE BLOOD TEST: CPT

## 2019-01-10 PROCEDURE — 93005 ELECTROCARDIOGRAM TRACING: CPT

## 2019-01-10 PROCEDURE — 2500000003 HC RX 250 WO HCPCS: Performed by: INTERNAL MEDICINE

## 2019-01-10 PROCEDURE — 82272 OCCULT BLD FECES 1-3 TESTS: CPT

## 2019-01-10 PROCEDURE — 84484 ASSAY OF TROPONIN QUANT: CPT

## 2019-01-10 PROCEDURE — C9113 INJ PANTOPRAZOLE SODIUM, VIA: HCPCS | Performed by: NURSE PRACTITIONER

## 2019-01-10 PROCEDURE — 2700000000 HC OXYGEN THERAPY PER DAY

## 2019-01-10 PROCEDURE — 93306 TTE W/DOPPLER COMPLETE: CPT

## 2019-01-10 PROCEDURE — 85027 COMPLETE CBC AUTOMATED: CPT

## 2019-01-10 PROCEDURE — 2580000003 HC RX 258: Performed by: HOSPITALIST

## 2019-01-10 PROCEDURE — 83735 ASSAY OF MAGNESIUM: CPT

## 2019-01-10 PROCEDURE — 6370000000 HC RX 637 (ALT 250 FOR IP): Performed by: INTERNAL MEDICINE

## 2019-01-10 PROCEDURE — 80053 COMPREHEN METABOLIC PANEL: CPT

## 2019-01-10 PROCEDURE — 85014 HEMATOCRIT: CPT

## 2019-01-10 RX ORDER — MAGNESIUM SULFATE 1 G/100ML
1 INJECTION INTRAVENOUS ONCE
Status: COMPLETED | OUTPATIENT
Start: 2019-01-10 | End: 2019-01-10

## 2019-01-10 RX ORDER — MAGNESIUM SULFATE 1 G/100ML
1 INJECTION INTRAVENOUS ONCE
Status: DISCONTINUED | OUTPATIENT
Start: 2019-01-10 | End: 2019-01-10

## 2019-01-10 RX ORDER — SODIUM CHLORIDE 9 MG/ML
INJECTION, SOLUTION INTRAVENOUS CONTINUOUS
Status: DISCONTINUED | OUTPATIENT
Start: 2019-01-10 | End: 2019-01-11

## 2019-01-10 RX ADMIN — Medication 125 MG: at 23:38

## 2019-01-10 RX ADMIN — CARVEDILOL 6.25 MG: 6.25 TABLET, FILM COATED ORAL at 08:59

## 2019-01-10 RX ADMIN — PANTOPRAZOLE SODIUM 40 MG: 40 INJECTION, POWDER, FOR SOLUTION INTRAVENOUS at 10:30

## 2019-01-10 RX ADMIN — METRONIDAZOLE 500 MG: 500 INJECTION, SOLUTION INTRAVENOUS at 05:55

## 2019-01-10 RX ADMIN — SODIUM CHLORIDE: 9 INJECTION, SOLUTION INTRAVENOUS at 10:29

## 2019-01-10 RX ADMIN — METRONIDAZOLE 500 MG: 500 INJECTION, SOLUTION INTRAVENOUS at 14:31

## 2019-01-10 RX ADMIN — IRON SUCROSE 200 MG: 20 INJECTION, SOLUTION INTRAVENOUS at 11:40

## 2019-01-10 RX ADMIN — ACETAMINOPHEN 650 MG: 325 TABLET ORAL at 08:58

## 2019-01-10 RX ADMIN — MAGNESIUM SULFATE HEPTAHYDRATE 1 G: 1 INJECTION, SOLUTION INTRAVENOUS at 10:29

## 2019-01-10 RX ADMIN — CIPROFLOXACIN 400 MG: 2 INJECTION, SOLUTION INTRAVENOUS at 11:39

## 2019-01-10 RX ADMIN — SODIUM CHLORIDE, PRESERVATIVE FREE 10 ML: 5 INJECTION INTRAVENOUS at 22:02

## 2019-01-10 RX ADMIN — INSULIN LISPRO 1 UNITS: 100 INJECTION, SOLUTION INTRAVENOUS; SUBCUTANEOUS at 08:09

## 2019-01-10 RX ADMIN — SODIUM CHLORIDE, PRESERVATIVE FREE 10 ML: 5 INJECTION INTRAVENOUS at 09:01

## 2019-01-10 RX ADMIN — METRONIDAZOLE 500 MG: 500 INJECTION, SOLUTION INTRAVENOUS at 22:01

## 2019-01-10 RX ADMIN — Medication 125 MG: at 17:34

## 2019-01-10 RX ADMIN — INSULIN LISPRO 1 UNITS: 100 INJECTION, SOLUTION INTRAVENOUS; SUBCUTANEOUS at 22:04

## 2019-01-10 ASSESSMENT — PAIN SCALES - GENERAL
PAINLEVEL_OUTOF10: 0

## 2019-01-11 LAB
ALBUMIN SERPL-MCNC: 2.3 GM/DL (ref 3.4–5)
ALP BLD-CCNC: 69 IU/L (ref 40–129)
ALT SERPL-CCNC: 19 U/L (ref 10–40)
ANION GAP SERPL CALCULATED.3IONS-SCNC: 11 MMOL/L (ref 4–16)
AST SERPL-CCNC: 33 IU/L (ref 15–37)
BILIRUB SERPL-MCNC: 0.2 MG/DL (ref 0–1)
BUN BLDV-MCNC: 57 MG/DL (ref 6–23)
CALCIUM SERPL-MCNC: 7.5 MG/DL (ref 8.3–10.6)
CHLORIDE BLD-SCNC: 101 MMOL/L (ref 99–110)
CO2: 21 MMOL/L (ref 21–32)
CREAT SERPL-MCNC: 2.6 MG/DL (ref 0.6–1.1)
EKG ATRIAL RATE: 63 BPM
EKG DIAGNOSIS: NORMAL
EKG P AXIS: 72 DEGREES
EKG P-R INTERVAL: 244 MS
EKG Q-T INTERVAL: 380 MS
EKG QRS DURATION: 88 MS
EKG QTC CALCULATION (BAZETT): 388 MS
EKG R AXIS: -31 DEGREES
EKG T AXIS: 104 DEGREES
EKG VENTRICULAR RATE: 63 BPM
GFR AFRICAN AMERICAN: 21 ML/MIN/1.73M2
GFR NON-AFRICAN AMERICAN: 17 ML/MIN/1.73M2
GLUCOSE BLD-MCNC: 121 MG/DL (ref 70–99)
GLUCOSE BLD-MCNC: 122 MG/DL (ref 70–99)
GLUCOSE BLD-MCNC: 125 MG/DL (ref 70–99)
GLUCOSE BLD-MCNC: 140 MG/DL (ref 70–99)
GLUCOSE BLD-MCNC: 150 MG/DL (ref 70–99)
HCT VFR BLD CALC: 22.4 % (ref 37–47)
HCT VFR BLD CALC: 23.5 % (ref 37–47)
HCT VFR BLD CALC: 25.3 % (ref 37–47)
HEMOCCULT STL QL: NEGATIVE
HEMOGLOBIN: 7.2 GM/DL (ref 12.5–16)
HEMOGLOBIN: 7.7 GM/DL (ref 12.5–16)
HEMOGLOBIN: 7.9 GM/DL (ref 12.5–16)
LACTATE: 0.7 MMOL/L (ref 0.4–2)
MAGNESIUM: 2.1 MG/DL (ref 1.8–2.4)
MCH RBC QN AUTO: 32.9 PG (ref 27–31)
MCHC RBC AUTO-ENTMCNC: 32.1 % (ref 32–36)
MCV RBC AUTO: 102.3 FL (ref 78–100)
PDW BLD-RTO: 14.2 % (ref 11.7–14.9)
PLATELET # BLD: 316 K/CU MM (ref 140–440)
PMV BLD AUTO: 9.3 FL (ref 7.5–11.1)
POTASSIUM SERPL-SCNC: 3.4 MMOL/L (ref 3.5–5.1)
RBC # BLD: 2.19 M/CU MM (ref 4.2–5.4)
SODIUM BLD-SCNC: 133 MMOL/L (ref 135–145)
TOTAL PROTEIN: 4.7 GM/DL (ref 6.4–8.2)
WBC # BLD: 19.2 K/CU MM (ref 4–10.5)

## 2019-01-11 PROCEDURE — 2140000000 HC CCU INTERMEDIATE R&B

## 2019-01-11 PROCEDURE — 83735 ASSAY OF MAGNESIUM: CPT

## 2019-01-11 PROCEDURE — 97110 THERAPEUTIC EXERCISES: CPT

## 2019-01-11 PROCEDURE — 2580000003 HC RX 258: Performed by: INTERNAL MEDICINE

## 2019-01-11 PROCEDURE — 2700000000 HC OXYGEN THERAPY PER DAY

## 2019-01-11 PROCEDURE — 6360000002 HC RX W HCPCS: Performed by: NURSE PRACTITIONER

## 2019-01-11 PROCEDURE — 6370000000 HC RX 637 (ALT 250 FOR IP): Performed by: INTERNAL MEDICINE

## 2019-01-11 PROCEDURE — 2500000003 HC RX 250 WO HCPCS: Performed by: INTERNAL MEDICINE

## 2019-01-11 PROCEDURE — C9113 INJ PANTOPRAZOLE SODIUM, VIA: HCPCS | Performed by: NURSE PRACTITIONER

## 2019-01-11 PROCEDURE — 6370000000 HC RX 637 (ALT 250 FOR IP): Performed by: HOSPITALIST

## 2019-01-11 PROCEDURE — 85027 COMPLETE CBC AUTOMATED: CPT

## 2019-01-11 PROCEDURE — 97530 THERAPEUTIC ACTIVITIES: CPT

## 2019-01-11 PROCEDURE — 80053 COMPREHEN METABOLIC PANEL: CPT

## 2019-01-11 PROCEDURE — 94761 N-INVAS EAR/PLS OXIMETRY MLT: CPT

## 2019-01-11 PROCEDURE — 83605 ASSAY OF LACTIC ACID: CPT

## 2019-01-11 PROCEDURE — 6360000002 HC RX W HCPCS: Performed by: HOSPITALIST

## 2019-01-11 PROCEDURE — 36415 COLL VENOUS BLD VENIPUNCTURE: CPT

## 2019-01-11 PROCEDURE — 85018 HEMOGLOBIN: CPT

## 2019-01-11 PROCEDURE — 6360000002 HC RX W HCPCS: Performed by: INTERNAL MEDICINE

## 2019-01-11 PROCEDURE — 93225 XTRNL ECG REC<48 HRS REC: CPT

## 2019-01-11 PROCEDURE — 2580000003 HC RX 258: Performed by: HOSPITALIST

## 2019-01-11 PROCEDURE — 85014 HEMATOCRIT: CPT

## 2019-01-11 PROCEDURE — 82962 GLUCOSE BLOOD TEST: CPT

## 2019-01-11 RX ORDER — SODIUM CHLORIDE AND POTASSIUM CHLORIDE .9; .15 G/100ML; G/100ML
SOLUTION INTRAVENOUS CONTINUOUS
Status: DISCONTINUED | OUTPATIENT
Start: 2019-01-11 | End: 2019-01-12

## 2019-01-11 RX ORDER — POTASSIUM CHLORIDE AND SODIUM CHLORIDE 450; 150 MG/100ML; MG/100ML
INJECTION, SOLUTION INTRAVENOUS CONTINUOUS
Status: DISCONTINUED | OUTPATIENT
Start: 2019-01-11 | End: 2019-01-11

## 2019-01-11 RX ORDER — POTASSIUM CHLORIDE 20MEQ/15ML
40 LIQUID (ML) ORAL ONCE
Status: COMPLETED | OUTPATIENT
Start: 2019-01-11 | End: 2019-01-11

## 2019-01-11 RX ADMIN — Medication 125 MG: at 05:48

## 2019-01-11 RX ADMIN — SODIUM CHLORIDE: 9 INJECTION, SOLUTION INTRAVENOUS at 02:27

## 2019-01-11 RX ADMIN — ONDANSETRON 4 MG: 2 INJECTION INTRAMUSCULAR; INTRAVENOUS at 08:03

## 2019-01-11 RX ADMIN — ACETAMINOPHEN 650 MG: 325 TABLET ORAL at 08:02

## 2019-01-11 RX ADMIN — METRONIDAZOLE 500 MG: 500 INJECTION, SOLUTION INTRAVENOUS at 05:47

## 2019-01-11 RX ADMIN — SODIUM CHLORIDE, PRESERVATIVE FREE 10 ML: 5 INJECTION INTRAVENOUS at 20:36

## 2019-01-11 RX ADMIN — Medication 125 MG: at 20:36

## 2019-01-11 RX ADMIN — PANTOPRAZOLE SODIUM 40 MG: 40 INJECTION, POWDER, FOR SOLUTION INTRAVENOUS at 08:02

## 2019-01-11 RX ADMIN — POTASSIUM CHLORIDE AND SODIUM CHLORIDE: 900; 150 INJECTION, SOLUTION INTRAVENOUS at 11:44

## 2019-01-11 RX ADMIN — INSULIN LISPRO 1 UNITS: 100 INJECTION, SOLUTION INTRAVENOUS; SUBCUTANEOUS at 16:47

## 2019-01-11 RX ADMIN — INSULIN LISPRO 1 UNITS: 100 INJECTION, SOLUTION INTRAVENOUS; SUBCUTANEOUS at 11:54

## 2019-01-11 RX ADMIN — ONDANSETRON 4 MG: 2 INJECTION INTRAMUSCULAR; INTRAVENOUS at 16:24

## 2019-01-11 RX ADMIN — Medication 125 MG: at 16:23

## 2019-01-11 RX ADMIN — METRONIDAZOLE 500 MG: 500 INJECTION, SOLUTION INTRAVENOUS at 13:22

## 2019-01-11 RX ADMIN — SODIUM CHLORIDE, PRESERVATIVE FREE 10 ML: 5 INJECTION INTRAVENOUS at 08:03

## 2019-01-11 RX ADMIN — POTASSIUM CHLORIDE AND SODIUM CHLORIDE: 900; 150 INJECTION, SOLUTION INTRAVENOUS at 20:35

## 2019-01-11 RX ADMIN — ACETAMINOPHEN 650 MG: 325 TABLET ORAL at 20:36

## 2019-01-11 RX ADMIN — METRONIDAZOLE 500 MG: 500 INJECTION, SOLUTION INTRAVENOUS at 20:36

## 2019-01-11 RX ADMIN — POTASSIUM CHLORIDE 40 MEQ: 40 SOLUTION ORAL at 11:44

## 2019-01-11 RX ADMIN — IRON SUCROSE 200 MG: 20 INJECTION, SOLUTION INTRAVENOUS at 11:44

## 2019-01-11 ASSESSMENT — PAIN SCALES - GENERAL
PAINLEVEL_OUTOF10: 0

## 2019-01-12 LAB
ALBUMIN SERPL-MCNC: 2.9 GM/DL (ref 3.4–5)
ALP BLD-CCNC: 80 IU/L (ref 40–128)
ALT SERPL-CCNC: 23 U/L (ref 10–40)
ANION GAP SERPL CALCULATED.3IONS-SCNC: 15 MMOL/L (ref 4–16)
AST SERPL-CCNC: 38 IU/L (ref 15–37)
BILIRUB SERPL-MCNC: 0.3 MG/DL (ref 0–1)
BUN BLDV-MCNC: 47 MG/DL (ref 6–23)
CALCIUM SERPL-MCNC: 8.2 MG/DL (ref 8.3–10.6)
CHLORIDE BLD-SCNC: 106 MMOL/L (ref 99–110)
CO2: 19 MMOL/L (ref 21–32)
CREAT SERPL-MCNC: 2.4 MG/DL (ref 0.6–1.1)
CULTURE: NORMAL
GFR AFRICAN AMERICAN: 23 ML/MIN/1.73M2
GFR NON-AFRICAN AMERICAN: 19 ML/MIN/1.73M2
GLUCOSE BLD-MCNC: 117 MG/DL (ref 70–99)
GLUCOSE BLD-MCNC: 118 MG/DL (ref 70–99)
GLUCOSE BLD-MCNC: 137 MG/DL (ref 70–99)
GLUCOSE BLD-MCNC: 137 MG/DL (ref 70–99)
GLUCOSE BLD-MCNC: 162 MG/DL (ref 70–99)
HCT VFR BLD CALC: 27.6 % (ref 37–47)
HEMOGLOBIN: 8.7 GM/DL (ref 12.5–16)
Lab: NORMAL
MCH RBC QN AUTO: 32.2 PG (ref 27–31)
MCHC RBC AUTO-ENTMCNC: 31.5 % (ref 32–36)
MCV RBC AUTO: 102.2 FL (ref 78–100)
PDW BLD-RTO: 14.6 % (ref 11.7–14.9)
PLATELET # BLD: 413 K/CU MM (ref 140–440)
PMV BLD AUTO: 8.9 FL (ref 7.5–11.1)
POTASSIUM SERPL-SCNC: 3.7 MMOL/L (ref 3.5–5.1)
PRO-BNP: ABNORMAL PG/ML
RBC # BLD: 2.7 M/CU MM (ref 4.2–5.4)
REPORT STATUS: NORMAL
SODIUM BLD-SCNC: 140 MMOL/L (ref 135–145)
SPECIMEN: NORMAL
TOTAL PROTEIN: 5.7 GM/DL (ref 6.4–8.2)
WBC # BLD: 20.5 K/CU MM (ref 4–10.5)

## 2019-01-12 PROCEDURE — 2700000000 HC OXYGEN THERAPY PER DAY

## 2019-01-12 PROCEDURE — 6360000002 HC RX W HCPCS: Performed by: INTERNAL MEDICINE

## 2019-01-12 PROCEDURE — 6370000000 HC RX 637 (ALT 250 FOR IP): Performed by: HOSPITALIST

## 2019-01-12 PROCEDURE — 94761 N-INVAS EAR/PLS OXIMETRY MLT: CPT

## 2019-01-12 PROCEDURE — 6360000002 HC RX W HCPCS

## 2019-01-12 PROCEDURE — 80053 COMPREHEN METABOLIC PANEL: CPT

## 2019-01-12 PROCEDURE — 6360000002 HC RX W HCPCS: Performed by: NURSE PRACTITIONER

## 2019-01-12 PROCEDURE — 2580000003 HC RX 258: Performed by: HOSPITALIST

## 2019-01-12 PROCEDURE — 83880 ASSAY OF NATRIURETIC PEPTIDE: CPT

## 2019-01-12 PROCEDURE — 36415 COLL VENOUS BLD VENIPUNCTURE: CPT

## 2019-01-12 PROCEDURE — 2500000003 HC RX 250 WO HCPCS: Performed by: INTERNAL MEDICINE

## 2019-01-12 PROCEDURE — 6370000000 HC RX 637 (ALT 250 FOR IP): Performed by: INTERNAL MEDICINE

## 2019-01-12 PROCEDURE — 2580000003 HC RX 258: Performed by: INTERNAL MEDICINE

## 2019-01-12 PROCEDURE — C9113 INJ PANTOPRAZOLE SODIUM, VIA: HCPCS | Performed by: NURSE PRACTITIONER

## 2019-01-12 PROCEDURE — 2140000000 HC CCU INTERMEDIATE R&B

## 2019-01-12 PROCEDURE — 85027 COMPLETE CBC AUTOMATED: CPT

## 2019-01-12 PROCEDURE — 82962 GLUCOSE BLOOD TEST: CPT

## 2019-01-12 RX ORDER — ISOSORBIDE MONONITRATE 30 MG/1
30 TABLET, EXTENDED RELEASE ORAL DAILY
Status: DISCONTINUED | OUTPATIENT
Start: 2019-01-12 | End: 2019-01-16 | Stop reason: HOSPADM

## 2019-01-12 RX ORDER — FUROSEMIDE 10 MG/ML
20 INJECTION INTRAMUSCULAR; INTRAVENOUS ONCE
Status: DISCONTINUED | OUTPATIENT
Start: 2019-01-12 | End: 2019-01-12

## 2019-01-12 RX ORDER — FUROSEMIDE 10 MG/ML
40 INJECTION INTRAMUSCULAR; INTRAVENOUS ONCE
Status: DISCONTINUED | OUTPATIENT
Start: 2019-01-12 | End: 2019-01-12

## 2019-01-12 RX ORDER — FUROSEMIDE 10 MG/ML
40 INJECTION INTRAMUSCULAR; INTRAVENOUS ONCE
Status: COMPLETED | OUTPATIENT
Start: 2019-01-12 | End: 2019-01-12

## 2019-01-12 RX ORDER — FUROSEMIDE 10 MG/ML
INJECTION INTRAMUSCULAR; INTRAVENOUS
Status: COMPLETED
Start: 2019-01-12 | End: 2019-01-12

## 2019-01-12 RX ADMIN — METRONIDAZOLE 500 MG: 500 INJECTION, SOLUTION INTRAVENOUS at 14:01

## 2019-01-12 RX ADMIN — IRON SUCROSE 200 MG: 20 INJECTION, SOLUTION INTRAVENOUS at 08:55

## 2019-01-12 RX ADMIN — INSULIN LISPRO 1 UNITS: 100 INJECTION, SOLUTION INTRAVENOUS; SUBCUTANEOUS at 16:38

## 2019-01-12 RX ADMIN — FUROSEMIDE 40 MG: 10 INJECTION INTRAMUSCULAR; INTRAVENOUS at 04:34

## 2019-01-12 RX ADMIN — ISOSORBIDE MONONITRATE 30 MG: 30 TABLET, EXTENDED RELEASE ORAL at 14:00

## 2019-01-12 RX ADMIN — FUROSEMIDE 40 MG: 10 INJECTION, SOLUTION INTRAMUSCULAR; INTRAVENOUS at 11:32

## 2019-01-12 RX ADMIN — PANTOPRAZOLE SODIUM 40 MG: 40 INJECTION, POWDER, FOR SOLUTION INTRAVENOUS at 08:55

## 2019-01-12 RX ADMIN — FUROSEMIDE 40 MG: 10 INJECTION, SOLUTION INTRAMUSCULAR; INTRAVENOUS at 04:34

## 2019-01-12 RX ADMIN — Medication 125 MG: at 08:55

## 2019-01-12 RX ADMIN — METRONIDAZOLE 500 MG: 500 INJECTION, SOLUTION INTRAVENOUS at 05:54

## 2019-01-12 RX ADMIN — Medication 125 MG: at 03:29

## 2019-01-12 RX ADMIN — ACETAMINOPHEN 650 MG: 325 TABLET ORAL at 21:07

## 2019-01-12 RX ADMIN — Medication 500 MG: at 21:08

## 2019-01-12 RX ADMIN — SODIUM CHLORIDE, PRESERVATIVE FREE 10 ML: 5 INJECTION INTRAVENOUS at 08:56

## 2019-01-12 RX ADMIN — ACETAMINOPHEN 650 MG: 325 TABLET ORAL at 04:35

## 2019-01-12 RX ADMIN — Medication 500 MG: at 14:00

## 2019-01-12 RX ADMIN — METRONIDAZOLE 500 MG: 500 INJECTION, SOLUTION INTRAVENOUS at 23:05

## 2019-01-12 ASSESSMENT — PAIN SCALES - GENERAL
PAINLEVEL_OUTOF10: 0
PAINLEVEL_OUTOF10: 10
PAINLEVEL_OUTOF10: 6

## 2019-01-12 ASSESSMENT — PAIN DESCRIPTION - LOCATION: LOCATION: GENERALIZED

## 2019-01-13 LAB
ANION GAP SERPL CALCULATED.3IONS-SCNC: 10 MMOL/L (ref 4–16)
BUN BLDV-MCNC: 39 MG/DL (ref 6–23)
CALCIUM SERPL-MCNC: 7.6 MG/DL (ref 8.3–10.6)
CHLORIDE BLD-SCNC: 106 MMOL/L (ref 99–110)
CO2: 23 MMOL/L (ref 21–32)
CREAT SERPL-MCNC: 2 MG/DL (ref 0.6–1.1)
GFR AFRICAN AMERICAN: 28 ML/MIN/1.73M2
GFR NON-AFRICAN AMERICAN: 24 ML/MIN/1.73M2
GLUCOSE BLD-MCNC: 108 MG/DL (ref 70–99)
GLUCOSE BLD-MCNC: 123 MG/DL (ref 70–99)
GLUCOSE BLD-MCNC: 128 MG/DL (ref 70–99)
GLUCOSE BLD-MCNC: 90 MG/DL (ref 70–99)
GLUCOSE BLD-MCNC: 95 MG/DL (ref 70–99)
HCT VFR BLD CALC: 24.3 % (ref 37–47)
HEMOGLOBIN: 7.7 GM/DL (ref 12.5–16)
MCH RBC QN AUTO: 32.1 PG (ref 27–31)
MCHC RBC AUTO-ENTMCNC: 31.7 % (ref 32–36)
MCV RBC AUTO: 101.3 FL (ref 78–100)
PDW BLD-RTO: 14.2 % (ref 11.7–14.9)
PLATELET # BLD: 330 K/CU MM (ref 140–440)
PMV BLD AUTO: 8.8 FL (ref 7.5–11.1)
POTASSIUM SERPL-SCNC: 3.4 MMOL/L (ref 3.5–5.1)
RBC # BLD: 2.4 M/CU MM (ref 4.2–5.4)
SODIUM BLD-SCNC: 139 MMOL/L (ref 135–145)
WBC # BLD: 10.9 K/CU MM (ref 4–10.5)

## 2019-01-13 PROCEDURE — 6370000000 HC RX 637 (ALT 250 FOR IP): Performed by: INTERNAL MEDICINE

## 2019-01-13 PROCEDURE — 2580000003 HC RX 258: Performed by: HOSPITALIST

## 2019-01-13 PROCEDURE — 36415 COLL VENOUS BLD VENIPUNCTURE: CPT

## 2019-01-13 PROCEDURE — C9113 INJ PANTOPRAZOLE SODIUM, VIA: HCPCS | Performed by: NURSE PRACTITIONER

## 2019-01-13 PROCEDURE — 2500000003 HC RX 250 WO HCPCS: Performed by: INTERNAL MEDICINE

## 2019-01-13 PROCEDURE — 82962 GLUCOSE BLOOD TEST: CPT

## 2019-01-13 PROCEDURE — 2140000000 HC CCU INTERMEDIATE R&B

## 2019-01-13 PROCEDURE — 2700000000 HC OXYGEN THERAPY PER DAY

## 2019-01-13 PROCEDURE — 85027 COMPLETE CBC AUTOMATED: CPT

## 2019-01-13 PROCEDURE — 6360000002 HC RX W HCPCS: Performed by: NURSE PRACTITIONER

## 2019-01-13 PROCEDURE — 94761 N-INVAS EAR/PLS OXIMETRY MLT: CPT

## 2019-01-13 PROCEDURE — 80048 BASIC METABOLIC PNL TOTAL CA: CPT

## 2019-01-13 RX ORDER — POTASSIUM CHLORIDE 20MEQ/15ML
40 LIQUID (ML) ORAL ONCE
Status: COMPLETED | OUTPATIENT
Start: 2019-01-13 | End: 2019-01-13

## 2019-01-13 RX ORDER — ONDANSETRON 2 MG/ML
4 INJECTION INTRAMUSCULAR; INTRAVENOUS 3 TIMES DAILY
Status: DISCONTINUED | OUTPATIENT
Start: 2019-01-13 | End: 2019-01-16 | Stop reason: HOSPADM

## 2019-01-13 RX ADMIN — SODIUM CHLORIDE, PRESERVATIVE FREE 10 ML: 5 INJECTION INTRAVENOUS at 09:19

## 2019-01-13 RX ADMIN — PANTOPRAZOLE SODIUM 40 MG: 40 INJECTION, POWDER, FOR SOLUTION INTRAVENOUS at 09:18

## 2019-01-13 RX ADMIN — METRONIDAZOLE 500 MG: 500 INJECTION, SOLUTION INTRAVENOUS at 05:42

## 2019-01-13 RX ADMIN — Medication 500 MG: at 03:03

## 2019-01-13 RX ADMIN — ONDANSETRON 4 MG: 2 INJECTION INTRAMUSCULAR; INTRAVENOUS at 14:30

## 2019-01-13 RX ADMIN — Medication 500 MG: at 22:49

## 2019-01-13 RX ADMIN — ISOSORBIDE MONONITRATE 30 MG: 30 TABLET, EXTENDED RELEASE ORAL at 09:18

## 2019-01-13 RX ADMIN — SODIUM CHLORIDE, PRESERVATIVE FREE 10 ML: 5 INJECTION INTRAVENOUS at 22:49

## 2019-01-13 RX ADMIN — POTASSIUM CHLORIDE 40 MEQ: 40 SOLUTION ORAL at 09:18

## 2019-01-13 RX ADMIN — Medication 500 MG: at 14:30

## 2019-01-13 RX ADMIN — ONDANSETRON 4 MG: 2 INJECTION INTRAMUSCULAR; INTRAVENOUS at 22:49

## 2019-01-13 RX ADMIN — Medication 500 MG: at 09:26

## 2019-01-14 LAB
ANION GAP SERPL CALCULATED.3IONS-SCNC: 10 MMOL/L (ref 4–16)
BUN BLDV-MCNC: 32 MG/DL (ref 6–23)
CALCIUM SERPL-MCNC: 7.5 MG/DL (ref 8.3–10.6)
CHLORIDE BLD-SCNC: 109 MMOL/L (ref 99–110)
CO2: 21 MMOL/L (ref 21–32)
CREAT SERPL-MCNC: 1.6 MG/DL (ref 0.6–1.1)
CULTURE: NORMAL
CULTURE: NORMAL
GFR AFRICAN AMERICAN: 37 ML/MIN/1.73M2
GFR NON-AFRICAN AMERICAN: 30 ML/MIN/1.73M2
GLUCOSE BLD-MCNC: 105 MG/DL (ref 70–99)
GLUCOSE BLD-MCNC: 112 MG/DL (ref 70–99)
GLUCOSE BLD-MCNC: 130 MG/DL (ref 70–99)
GLUCOSE BLD-MCNC: 140 MG/DL (ref 70–99)
GLUCOSE BLD-MCNC: 91 MG/DL (ref 70–99)
HCT VFR BLD CALC: 23.8 % (ref 37–47)
HCT VFR BLD CALC: 25.1 % (ref 37–47)
HEMOGLOBIN: 7.4 GM/DL (ref 12.5–16)
HEMOGLOBIN: 7.7 GM/DL (ref 12.5–16)
Lab: NORMAL
Lab: NORMAL
MCH RBC QN AUTO: 32.3 PG (ref 27–31)
MCHC RBC AUTO-ENTMCNC: 31.1 % (ref 32–36)
MCV RBC AUTO: 103.9 FL (ref 78–100)
PDW BLD-RTO: 14.3 % (ref 11.7–14.9)
PLATELET # BLD: 327 K/CU MM (ref 140–440)
PMV BLD AUTO: 8.6 FL (ref 7.5–11.1)
POTASSIUM SERPL-SCNC: 3.6 MMOL/L (ref 3.5–5.1)
PRO-BNP: 7617 PG/ML
RBC # BLD: 2.29 M/CU MM (ref 4.2–5.4)
REPORT STATUS: NORMAL
REPORT STATUS: NORMAL
SODIUM BLD-SCNC: 140 MMOL/L (ref 135–145)
SPECIMEN: NORMAL
SPECIMEN: NORMAL
WBC # BLD: 10.2 K/CU MM (ref 4–10.5)

## 2019-01-14 PROCEDURE — 2580000003 HC RX 258: Performed by: INTERNAL MEDICINE

## 2019-01-14 PROCEDURE — 94761 N-INVAS EAR/PLS OXIMETRY MLT: CPT

## 2019-01-14 PROCEDURE — 85027 COMPLETE CBC AUTOMATED: CPT

## 2019-01-14 PROCEDURE — 86922 COMPATIBILITY TEST ANTIGLOB: CPT

## 2019-01-14 PROCEDURE — 36415 COLL VENOUS BLD VENIPUNCTURE: CPT

## 2019-01-14 PROCEDURE — 85018 HEMOGLOBIN: CPT

## 2019-01-14 PROCEDURE — 6360000002 HC RX W HCPCS: Performed by: NURSE PRACTITIONER

## 2019-01-14 PROCEDURE — 6370000000 HC RX 637 (ALT 250 FOR IP): Performed by: INTERNAL MEDICINE

## 2019-01-14 PROCEDURE — 86901 BLOOD TYPING SEROLOGIC RH(D): CPT

## 2019-01-14 PROCEDURE — 36430 TRANSFUSION BLD/BLD COMPNT: CPT

## 2019-01-14 PROCEDURE — P9016 RBC LEUKOCYTES REDUCED: HCPCS

## 2019-01-14 PROCEDURE — 6360000002 HC RX W HCPCS: Performed by: INTERNAL MEDICINE

## 2019-01-14 PROCEDURE — 6370000000 HC RX 637 (ALT 250 FOR IP): Performed by: HOSPITALIST

## 2019-01-14 PROCEDURE — 83880 ASSAY OF NATRIURETIC PEPTIDE: CPT

## 2019-01-14 PROCEDURE — 86850 RBC ANTIBODY SCREEN: CPT

## 2019-01-14 PROCEDURE — 2700000000 HC OXYGEN THERAPY PER DAY

## 2019-01-14 PROCEDURE — 2580000003 HC RX 258: Performed by: HOSPITALIST

## 2019-01-14 PROCEDURE — C9113 INJ PANTOPRAZOLE SODIUM, VIA: HCPCS | Performed by: NURSE PRACTITIONER

## 2019-01-14 PROCEDURE — 82962 GLUCOSE BLOOD TEST: CPT

## 2019-01-14 PROCEDURE — 85014 HEMATOCRIT: CPT

## 2019-01-14 PROCEDURE — 80048 BASIC METABOLIC PNL TOTAL CA: CPT

## 2019-01-14 PROCEDURE — 86900 BLOOD TYPING SEROLOGIC ABO: CPT

## 2019-01-14 PROCEDURE — 2140000000 HC CCU INTERMEDIATE R&B

## 2019-01-14 RX ORDER — FUROSEMIDE 10 MG/ML
20 INJECTION INTRAMUSCULAR; INTRAVENOUS SEE ADMIN INSTRUCTIONS
Status: COMPLETED | OUTPATIENT
Start: 2019-01-14 | End: 2019-01-14

## 2019-01-14 RX ORDER — 0.9 % SODIUM CHLORIDE 0.9 %
250 INTRAVENOUS SOLUTION INTRAVENOUS ONCE
Status: COMPLETED | OUTPATIENT
Start: 2019-01-14 | End: 2019-01-14

## 2019-01-14 RX ORDER — 0.9 % SODIUM CHLORIDE 0.9 %
250 INTRAVENOUS SOLUTION INTRAVENOUS ONCE
Status: DISCONTINUED | OUTPATIENT
Start: 2019-01-14 | End: 2019-01-16 | Stop reason: HOSPADM

## 2019-01-14 RX ADMIN — ISOSORBIDE MONONITRATE 30 MG: 30 TABLET, EXTENDED RELEASE ORAL at 10:42

## 2019-01-14 RX ADMIN — Medication 500 MG: at 23:36

## 2019-01-14 RX ADMIN — Medication 500 MG: at 12:19

## 2019-01-14 RX ADMIN — ONDANSETRON 4 MG: 2 INJECTION INTRAMUSCULAR; INTRAVENOUS at 19:16

## 2019-01-14 RX ADMIN — Medication 500 MG: at 17:30

## 2019-01-14 RX ADMIN — SODIUM CHLORIDE 250 ML: 9 INJECTION, SOLUTION INTRAVENOUS at 19:16

## 2019-01-14 RX ADMIN — Medication 500 MG: at 07:00

## 2019-01-14 RX ADMIN — SODIUM CHLORIDE, PRESERVATIVE FREE 10 ML: 5 INJECTION INTRAVENOUS at 10:43

## 2019-01-14 RX ADMIN — ACETAMINOPHEN 650 MG: 325 TABLET ORAL at 20:53

## 2019-01-14 RX ADMIN — ONDANSETRON 4 MG: 2 INJECTION INTRAMUSCULAR; INTRAVENOUS at 12:19

## 2019-01-14 RX ADMIN — FUROSEMIDE 20 MG: 10 INJECTION, SOLUTION INTRAVENOUS at 20:20

## 2019-01-14 RX ADMIN — PANTOPRAZOLE SODIUM 40 MG: 40 INJECTION, POWDER, FOR SOLUTION INTRAVENOUS at 10:43

## 2019-01-14 RX ADMIN — INSULIN LISPRO 1 UNITS: 100 INJECTION, SOLUTION INTRAVENOUS; SUBCUTANEOUS at 17:30

## 2019-01-14 RX ADMIN — SODIUM CHLORIDE 250 ML: 9 INJECTION, SOLUTION INTRAVENOUS at 12:19

## 2019-01-14 ASSESSMENT — PAIN SCALES - GENERAL
PAINLEVEL_OUTOF10: 0
PAINLEVEL_OUTOF10: 8
PAINLEVEL_OUTOF10: 0

## 2019-01-14 ASSESSMENT — PAIN DESCRIPTION - LOCATION: LOCATION: HEAD

## 2019-01-14 ASSESSMENT — PAIN DESCRIPTION - DESCRIPTORS: DESCRIPTORS: ACHING

## 2019-01-14 ASSESSMENT — PAIN DESCRIPTION - PAIN TYPE: TYPE: ACUTE PAIN

## 2019-01-15 LAB
ANION GAP SERPL CALCULATED.3IONS-SCNC: 11 MMOL/L (ref 4–16)
BUN BLDV-MCNC: 24 MG/DL (ref 6–23)
CALCIUM SERPL-MCNC: 7.7 MG/DL (ref 8.3–10.6)
CHLORIDE BLD-SCNC: 109 MMOL/L (ref 99–110)
CO2: 24 MMOL/L (ref 21–32)
CREAT SERPL-MCNC: 1.6 MG/DL (ref 0.6–1.1)
GFR AFRICAN AMERICAN: 37 ML/MIN/1.73M2
GFR NON-AFRICAN AMERICAN: 30 ML/MIN/1.73M2
GLUCOSE BLD-MCNC: 107 MG/DL (ref 70–99)
GLUCOSE BLD-MCNC: 117 MG/DL (ref 70–99)
GLUCOSE BLD-MCNC: 152 MG/DL (ref 70–99)
GLUCOSE BLD-MCNC: 88 MG/DL (ref 70–99)
GLUCOSE BLD-MCNC: 93 MG/DL (ref 70–99)
HCT VFR BLD CALC: 35 % (ref 37–47)
HEMOGLOBIN: 11.3 GM/DL (ref 12.5–16)
MCH RBC QN AUTO: 31.6 PG (ref 27–31)
MCHC RBC AUTO-ENTMCNC: 32.3 % (ref 32–36)
MCV RBC AUTO: 97.8 FL (ref 78–100)
PDW BLD-RTO: 15.2 % (ref 11.7–14.9)
PLATELET # BLD: 314 K/CU MM (ref 140–440)
PMV BLD AUTO: 8.6 FL (ref 7.5–11.1)
POTASSIUM SERPL-SCNC: 4 MMOL/L (ref 3.5–5.1)
RBC # BLD: 3.58 M/CU MM (ref 4.2–5.4)
SODIUM BLD-SCNC: 144 MMOL/L (ref 135–145)
WBC # BLD: 8.5 K/CU MM (ref 4–10.5)

## 2019-01-15 PROCEDURE — 94761 N-INVAS EAR/PLS OXIMETRY MLT: CPT

## 2019-01-15 PROCEDURE — 2580000003 HC RX 258: Performed by: HOSPITALIST

## 2019-01-15 PROCEDURE — 6360000002 HC RX W HCPCS: Performed by: NURSE PRACTITIONER

## 2019-01-15 PROCEDURE — 97110 THERAPEUTIC EXERCISES: CPT

## 2019-01-15 PROCEDURE — 36415 COLL VENOUS BLD VENIPUNCTURE: CPT

## 2019-01-15 PROCEDURE — 6360000002 HC RX W HCPCS: Performed by: INTERNAL MEDICINE

## 2019-01-15 PROCEDURE — 97530 THERAPEUTIC ACTIVITIES: CPT

## 2019-01-15 PROCEDURE — 85027 COMPLETE CBC AUTOMATED: CPT

## 2019-01-15 PROCEDURE — 6370000000 HC RX 637 (ALT 250 FOR IP): Performed by: INTERNAL MEDICINE

## 2019-01-15 PROCEDURE — 6370000000 HC RX 637 (ALT 250 FOR IP): Performed by: HOSPITALIST

## 2019-01-15 PROCEDURE — 2140000000 HC CCU INTERMEDIATE R&B

## 2019-01-15 PROCEDURE — 6370000000 HC RX 637 (ALT 250 FOR IP): Performed by: NURSE PRACTITIONER

## 2019-01-15 PROCEDURE — 80048 BASIC METABOLIC PNL TOTAL CA: CPT

## 2019-01-15 PROCEDURE — 82962 GLUCOSE BLOOD TEST: CPT

## 2019-01-15 RX ORDER — AMLODIPINE BESYLATE 5 MG/1
5 TABLET ORAL DAILY
Status: DISCONTINUED | OUTPATIENT
Start: 2019-01-15 | End: 2019-01-16

## 2019-01-15 RX ORDER — LOPERAMIDE HYDROCHLORIDE 2 MG/1
2 CAPSULE ORAL 3 TIMES DAILY
Status: DISCONTINUED | OUTPATIENT
Start: 2019-01-15 | End: 2019-01-16 | Stop reason: HOSPADM

## 2019-01-15 RX ORDER — PROMETHAZINE HYDROCHLORIDE 25 MG/ML
6.25 INJECTION, SOLUTION INTRAMUSCULAR; INTRAVENOUS EVERY 6 HOURS PRN
Status: DISCONTINUED | OUTPATIENT
Start: 2019-01-15 | End: 2019-01-16 | Stop reason: HOSPADM

## 2019-01-15 RX ORDER — LIDOCAINE 4 G/G
1 PATCH TOPICAL DAILY
Status: DISCONTINUED | OUTPATIENT
Start: 2019-01-15 | End: 2019-01-16 | Stop reason: HOSPADM

## 2019-01-15 RX ORDER — LACTOBACILLUS RHAMNOSUS GG 10B CELL
1 CAPSULE ORAL
Status: DISCONTINUED | OUTPATIENT
Start: 2019-01-15 | End: 2019-01-16 | Stop reason: HOSPADM

## 2019-01-15 RX ORDER — GABAPENTIN 100 MG/1
100 CAPSULE ORAL 3 TIMES DAILY
Status: DISCONTINUED | OUTPATIENT
Start: 2019-01-15 | End: 2019-01-16 | Stop reason: HOSPADM

## 2019-01-15 RX ADMIN — SODIUM CHLORIDE, PRESERVATIVE FREE 10 ML: 5 INJECTION INTRAVENOUS at 11:26

## 2019-01-15 RX ADMIN — Medication 500 MG: at 04:49

## 2019-01-15 RX ADMIN — Medication 500 MG: at 17:05

## 2019-01-15 RX ADMIN — PROMETHAZINE HYDROCHLORIDE 6.25 MG: 25 INJECTION INTRAMUSCULAR; INTRAVENOUS at 11:18

## 2019-01-15 RX ADMIN — SODIUM CHLORIDE, PRESERVATIVE FREE 10 ML: 5 INJECTION INTRAVENOUS at 21:24

## 2019-01-15 RX ADMIN — GABAPENTIN 100 MG: 100 CAPSULE ORAL at 21:24

## 2019-01-15 RX ADMIN — ACETAMINOPHEN 650 MG: 325 TABLET ORAL at 14:04

## 2019-01-15 RX ADMIN — Medication 500 MG: at 11:22

## 2019-01-15 RX ADMIN — AMLODIPINE BESYLATE 5 MG: 5 TABLET ORAL at 14:04

## 2019-01-15 RX ADMIN — ONDANSETRON 4 MG: 2 INJECTION INTRAMUSCULAR; INTRAVENOUS at 21:24

## 2019-01-15 RX ADMIN — GABAPENTIN 100 MG: 100 CAPSULE ORAL at 14:04

## 2019-01-15 RX ADMIN — ISOSORBIDE MONONITRATE 30 MG: 30 TABLET, EXTENDED RELEASE ORAL at 11:23

## 2019-01-15 RX ADMIN — Medication 1 CAPSULE: at 16:56

## 2019-01-15 RX ADMIN — INSULIN LISPRO 1 UNITS: 100 INJECTION, SOLUTION INTRAVENOUS; SUBCUTANEOUS at 21:25

## 2019-01-15 RX ADMIN — LOPERAMIDE HYDROCHLORIDE 2 MG: 2 CAPSULE ORAL at 21:24

## 2019-01-15 RX ADMIN — Medication 500 MG: at 22:42

## 2019-01-15 RX ADMIN — LOPERAMIDE HYDROCHLORIDE 2 MG: 2 CAPSULE ORAL at 16:57

## 2019-01-15 ASSESSMENT — PAIN SCALES - GENERAL
PAINLEVEL_OUTOF10: 0

## 2019-01-16 ENCOUNTER — HOSPITAL ENCOUNTER (INPATIENT)
Age: 84
LOS: 8 days | Discharge: NURSING, REHAB OR LONG-TERM CARE | DRG: 872 | End: 2019-01-24
Attending: PHYSICAL MEDICINE & REHABILITATION | Admitting: PHYSICAL MEDICINE & REHABILITATION
Payer: MEDICARE

## 2019-01-16 VITALS
HEIGHT: 65 IN | RESPIRATION RATE: 20 BRPM | DIASTOLIC BLOOD PRESSURE: 48 MMHG | WEIGHT: 129.1 LBS | OXYGEN SATURATION: 92 % | TEMPERATURE: 98 F | SYSTOLIC BLOOD PRESSURE: 125 MMHG | HEART RATE: 78 BPM | BODY MASS INDEX: 21.51 KG/M2

## 2019-01-16 LAB
ANION GAP SERPL CALCULATED.3IONS-SCNC: 12 MMOL/L (ref 4–16)
BUN BLDV-MCNC: 25 MG/DL (ref 6–23)
CALCIUM SERPL-MCNC: 7.3 MG/DL (ref 8.3–10.6)
CHLORIDE BLD-SCNC: 110 MMOL/L (ref 99–110)
CO2: 22 MMOL/L (ref 21–32)
CREAT SERPL-MCNC: 1.5 MG/DL (ref 0.6–1.1)
GFR AFRICAN AMERICAN: 40 ML/MIN/1.73M2
GFR NON-AFRICAN AMERICAN: 33 ML/MIN/1.73M2
GLUCOSE BLD-MCNC: 138 MG/DL (ref 70–99)
GLUCOSE BLD-MCNC: 138 MG/DL (ref 70–99)
GLUCOSE BLD-MCNC: 193 MG/DL (ref 70–99)
GLUCOSE BLD-MCNC: 84 MG/DL (ref 70–99)
GLUCOSE BLD-MCNC: 88 MG/DL (ref 70–99)
HCT VFR BLD CALC: 34.4 % (ref 37–47)
HEMOGLOBIN: 10.7 GM/DL (ref 12.5–16)
MCH RBC QN AUTO: 30.9 PG (ref 27–31)
MCHC RBC AUTO-ENTMCNC: 31.1 % (ref 32–36)
MCV RBC AUTO: 99.4 FL (ref 78–100)
PDW BLD-RTO: 15.6 % (ref 11.7–14.9)
PLATELET # BLD: 311 K/CU MM (ref 140–440)
PMV BLD AUTO: 8.8 FL (ref 7.5–11.1)
POTASSIUM SERPL-SCNC: 4.1 MMOL/L (ref 3.5–5.1)
RBC # BLD: 3.46 M/CU MM (ref 4.2–5.4)
SODIUM BLD-SCNC: 144 MMOL/L (ref 135–145)
WBC # BLD: 7.6 K/CU MM (ref 4–10.5)

## 2019-01-16 PROCEDURE — 1280000000 HC REHAB R&B

## 2019-01-16 PROCEDURE — 6370000000 HC RX 637 (ALT 250 FOR IP): Performed by: INTERNAL MEDICINE

## 2019-01-16 PROCEDURE — 2580000003 HC RX 258: Performed by: HOSPITALIST

## 2019-01-16 PROCEDURE — 80048 BASIC METABOLIC PNL TOTAL CA: CPT

## 2019-01-16 PROCEDURE — 6360000002 HC RX W HCPCS: Performed by: INTERNAL MEDICINE

## 2019-01-16 PROCEDURE — 94761 N-INVAS EAR/PLS OXIMETRY MLT: CPT

## 2019-01-16 PROCEDURE — 6360000002 HC RX W HCPCS: Performed by: NURSE PRACTITIONER

## 2019-01-16 PROCEDURE — 36415 COLL VENOUS BLD VENIPUNCTURE: CPT

## 2019-01-16 PROCEDURE — 85027 COMPLETE CBC AUTOMATED: CPT

## 2019-01-16 PROCEDURE — 6370000000 HC RX 637 (ALT 250 FOR IP): Performed by: HOSPITALIST

## 2019-01-16 PROCEDURE — 2580000003 HC RX 258: Performed by: INTERNAL MEDICINE

## 2019-01-16 PROCEDURE — 6370000000 HC RX 637 (ALT 250 FOR IP): Performed by: NURSE PRACTITIONER

## 2019-01-16 PROCEDURE — 82962 GLUCOSE BLOOD TEST: CPT

## 2019-01-16 RX ORDER — NICOTINE POLACRILEX 4 MG
15 LOZENGE BUCCAL PRN
Status: CANCELLED | OUTPATIENT
Start: 2019-01-16

## 2019-01-16 RX ORDER — ISOSORBIDE MONONITRATE 30 MG/1
30 TABLET, EXTENDED RELEASE ORAL DAILY
Status: DISCONTINUED | OUTPATIENT
Start: 2019-01-17 | End: 2019-01-24 | Stop reason: HOSPADM

## 2019-01-16 RX ORDER — DEXTROSE MONOHYDRATE 25 G/50ML
12.5 INJECTION, SOLUTION INTRAVENOUS PRN
Status: DISCONTINUED | OUTPATIENT
Start: 2019-01-16 | End: 2019-01-24 | Stop reason: HOSPADM

## 2019-01-16 RX ORDER — GABAPENTIN 100 MG/1
100 CAPSULE ORAL 3 TIMES DAILY
Status: DISCONTINUED | OUTPATIENT
Start: 2019-01-16 | End: 2019-01-18

## 2019-01-16 RX ORDER — ACETAMINOPHEN 325 MG/1
650 TABLET ORAL EVERY 4 HOURS PRN
Status: CANCELLED | OUTPATIENT
Start: 2019-01-16

## 2019-01-16 RX ORDER — LOPERAMIDE HYDROCHLORIDE 2 MG/1
2 CAPSULE ORAL 3 TIMES DAILY
Status: COMPLETED | OUTPATIENT
Start: 2019-01-16 | End: 2019-01-17

## 2019-01-16 RX ORDER — LIDOCAINE 4 G/G
1 PATCH TOPICAL DAILY
Status: DISCONTINUED | OUTPATIENT
Start: 2019-01-17 | End: 2019-01-24 | Stop reason: HOSPADM

## 2019-01-16 RX ORDER — LIDOCAINE 4 G/G
1 PATCH TOPICAL DAILY
Status: CANCELLED | OUTPATIENT
Start: 2019-01-17

## 2019-01-16 RX ORDER — ONDANSETRON 2 MG/ML
4 INJECTION INTRAMUSCULAR; INTRAVENOUS 3 TIMES DAILY
Status: CANCELLED | OUTPATIENT
Start: 2019-01-16

## 2019-01-16 RX ORDER — PROMETHAZINE HYDROCHLORIDE 25 MG/ML
6.25 INJECTION, SOLUTION INTRAMUSCULAR; INTRAVENOUS EVERY 6 HOURS PRN
Status: CANCELLED | OUTPATIENT
Start: 2019-01-16

## 2019-01-16 RX ORDER — ISOSORBIDE MONONITRATE 30 MG/1
30 TABLET, EXTENDED RELEASE ORAL DAILY
Status: CANCELLED | OUTPATIENT
Start: 2019-01-17

## 2019-01-16 RX ORDER — ONDANSETRON 2 MG/ML
4 INJECTION INTRAMUSCULAR; INTRAVENOUS 3 TIMES DAILY
Status: DISCONTINUED | OUTPATIENT
Start: 2019-01-16 | End: 2019-01-18

## 2019-01-16 RX ORDER — DEXTROSE MONOHYDRATE 25 G/50ML
12.5 INJECTION, SOLUTION INTRAVENOUS PRN
Status: CANCELLED | OUTPATIENT
Start: 2019-01-16

## 2019-01-16 RX ORDER — ACETAMINOPHEN 325 MG/1
650 TABLET ORAL EVERY 4 HOURS PRN
Status: DISCONTINUED | OUTPATIENT
Start: 2019-01-16 | End: 2019-01-24 | Stop reason: HOSPADM

## 2019-01-16 RX ORDER — GABAPENTIN 100 MG/1
100 CAPSULE ORAL 3 TIMES DAILY
Status: CANCELLED | OUTPATIENT
Start: 2019-01-16

## 2019-01-16 RX ORDER — PROMETHAZINE HYDROCHLORIDE 25 MG/ML
6.25 INJECTION, SOLUTION INTRAMUSCULAR; INTRAVENOUS EVERY 6 HOURS PRN
Status: DISCONTINUED | OUTPATIENT
Start: 2019-01-16 | End: 2019-01-18

## 2019-01-16 RX ORDER — LOPERAMIDE HYDROCHLORIDE 2 MG/1
2 CAPSULE ORAL 3 TIMES DAILY
Status: CANCELLED | OUTPATIENT
Start: 2019-01-16 | End: 2019-01-17

## 2019-01-16 RX ORDER — DEXTROSE MONOHYDRATE 50 MG/ML
100 INJECTION, SOLUTION INTRAVENOUS PRN
Status: CANCELLED | OUTPATIENT
Start: 2019-01-16

## 2019-01-16 RX ORDER — SODIUM CHLORIDE 0.9 % (FLUSH) 0.9 %
10 SYRINGE (ML) INJECTION PRN
Status: DISCONTINUED | OUTPATIENT
Start: 2019-01-16 | End: 2019-01-22

## 2019-01-16 RX ORDER — LACTOBACILLUS RHAMNOSUS GG 10B CELL
1 CAPSULE ORAL
Status: CANCELLED | OUTPATIENT
Start: 2019-01-17

## 2019-01-16 RX ORDER — SODIUM CHLORIDE 0.9 % (FLUSH) 0.9 %
10 SYRINGE (ML) INJECTION EVERY 12 HOURS SCHEDULED
Status: CANCELLED | OUTPATIENT
Start: 2019-01-16

## 2019-01-16 RX ORDER — NICOTINE POLACRILEX 4 MG
15 LOZENGE BUCCAL PRN
Status: DISCONTINUED | OUTPATIENT
Start: 2019-01-16 | End: 2019-01-24 | Stop reason: HOSPADM

## 2019-01-16 RX ORDER — SODIUM CHLORIDE 0.9 % (FLUSH) 0.9 %
10 SYRINGE (ML) INJECTION EVERY 12 HOURS SCHEDULED
Status: DISCONTINUED | OUTPATIENT
Start: 2019-01-16 | End: 2019-01-22

## 2019-01-16 RX ORDER — DEXTROSE MONOHYDRATE 50 MG/ML
100 INJECTION, SOLUTION INTRAVENOUS PRN
Status: DISCONTINUED | OUTPATIENT
Start: 2019-01-16 | End: 2019-01-24 | Stop reason: HOSPADM

## 2019-01-16 RX ORDER — LACTOBACILLUS RHAMNOSUS GG 10B CELL
1 CAPSULE ORAL
Status: DISCONTINUED | OUTPATIENT
Start: 2019-01-17 | End: 2019-01-24 | Stop reason: HOSPADM

## 2019-01-16 RX ORDER — SODIUM CHLORIDE 0.9 % (FLUSH) 0.9 %
10 SYRINGE (ML) INJECTION PRN
Status: CANCELLED | OUTPATIENT
Start: 2019-01-16

## 2019-01-16 RX ADMIN — Medication 500 MG: at 10:12

## 2019-01-16 RX ADMIN — ONDANSETRON 4 MG: 2 INJECTION, SOLUTION INTRAMUSCULAR; INTRAVENOUS at 20:07

## 2019-01-16 RX ADMIN — LOPERAMIDE HYDROCHLORIDE 2 MG: 2 CAPSULE ORAL at 20:07

## 2019-01-16 RX ADMIN — LOPERAMIDE HYDROCHLORIDE 2 MG: 2 CAPSULE ORAL at 08:27

## 2019-01-16 RX ADMIN — SODIUM CHLORIDE, PRESERVATIVE FREE 10 ML: 5 INJECTION INTRAVENOUS at 08:27

## 2019-01-16 RX ADMIN — Medication 500 MG: at 17:18

## 2019-01-16 RX ADMIN — Medication 10 ML: at 21:00

## 2019-01-16 RX ADMIN — GABAPENTIN 100 MG: 100 CAPSULE ORAL at 20:07

## 2019-01-16 RX ADMIN — GABAPENTIN 100 MG: 100 CAPSULE ORAL at 08:28

## 2019-01-16 RX ADMIN — ISOSORBIDE MONONITRATE 30 MG: 30 TABLET, EXTENDED RELEASE ORAL at 08:27

## 2019-01-16 RX ADMIN — DILTIAZEM HYDROCHLORIDE 30 MG: 30 TABLET, FILM COATED ORAL at 10:11

## 2019-01-16 RX ADMIN — Medication 500 MG: at 04:44

## 2019-01-16 RX ADMIN — GABAPENTIN 100 MG: 100 CAPSULE ORAL at 14:21

## 2019-01-16 RX ADMIN — Medication 1 CAPSULE: at 08:28

## 2019-01-16 RX ADMIN — RIVAROXABAN 15 MG: 15 TABLET, FILM COATED ORAL at 18:48

## 2019-01-16 RX ADMIN — ONDANSETRON 4 MG: 2 INJECTION INTRAMUSCULAR; INTRAVENOUS at 14:21

## 2019-01-16 RX ADMIN — LOPERAMIDE HYDROCHLORIDE 2 MG: 2 CAPSULE ORAL at 14:21

## 2019-01-16 RX ADMIN — AMLODIPINE BESYLATE 5 MG: 5 TABLET ORAL at 08:27

## 2019-01-16 RX ADMIN — DILTIAZEM HYDROCHLORIDE 30 MG: 30 TABLET, FILM COATED ORAL at 17:25

## 2019-01-16 RX ADMIN — INSULIN LISPRO 1 UNITS: 100 INJECTION, SOLUTION INTRAVENOUS; SUBCUTANEOUS at 11:50

## 2019-01-16 RX ADMIN — ONDANSETRON 4 MG: 2 INJECTION INTRAMUSCULAR; INTRAVENOUS at 08:27

## 2019-01-16 RX ADMIN — Medication 500 MG: at 21:05

## 2019-01-16 ASSESSMENT — PAIN SCALES - WONG BAKER
WONGBAKER_NUMERICALRESPONSE: 0
WONGBAKER_NUMERICALRESPONSE: 0

## 2019-01-16 ASSESSMENT — PAIN SCALES - GENERAL
PAINLEVEL_OUTOF10: 0
PAINLEVEL_OUTOF10: 0

## 2019-01-17 LAB
ANION GAP SERPL CALCULATED.3IONS-SCNC: 13 MMOL/L (ref 4–16)
BUN BLDV-MCNC: 31 MG/DL (ref 6–23)
CALCIUM SERPL-MCNC: 7.5 MG/DL (ref 8.3–10.6)
CHLORIDE BLD-SCNC: 104 MMOL/L (ref 99–110)
CO2: 20 MMOL/L (ref 21–32)
CREAT SERPL-MCNC: 1.7 MG/DL (ref 0.6–1.1)
GFR AFRICAN AMERICAN: 34 ML/MIN/1.73M2
GFR NON-AFRICAN AMERICAN: 28 ML/MIN/1.73M2
GLUCOSE BLD-MCNC: 119 MG/DL (ref 70–99)
GLUCOSE BLD-MCNC: 133 MG/DL (ref 70–99)
GLUCOSE BLD-MCNC: 143 MG/DL (ref 70–99)
GLUCOSE BLD-MCNC: 95 MG/DL (ref 70–99)
GLUCOSE BLD-MCNC: 96 MG/DL (ref 70–99)
HCT VFR BLD CALC: 35.4 % (ref 37–47)
HEMOGLOBIN: 10.7 GM/DL (ref 12.5–16)
MCH RBC QN AUTO: 31.2 PG (ref 27–31)
MCHC RBC AUTO-ENTMCNC: 30.2 % (ref 32–36)
MCV RBC AUTO: 103.2 FL (ref 78–100)
PDW BLD-RTO: 15.5 % (ref 11.7–14.9)
PLATELET # BLD: 299 K/CU MM (ref 140–440)
PMV BLD AUTO: 8.9 FL (ref 7.5–11.1)
POTASSIUM SERPL-SCNC: 4.3 MMOL/L (ref 3.5–5.1)
RBC # BLD: 3.43 M/CU MM (ref 4.2–5.4)
SODIUM BLD-SCNC: 137 MMOL/L (ref 135–145)
WBC # BLD: 9.4 K/CU MM (ref 4–10.5)

## 2019-01-17 PROCEDURE — 1280000000 HC REHAB R&B

## 2019-01-17 PROCEDURE — 97530 THERAPEUTIC ACTIVITIES: CPT

## 2019-01-17 PROCEDURE — 97166 OT EVAL MOD COMPLEX 45 MIN: CPT

## 2019-01-17 PROCEDURE — 6370000000 HC RX 637 (ALT 250 FOR IP): Performed by: INTERNAL MEDICINE

## 2019-01-17 PROCEDURE — 82962 GLUCOSE BLOOD TEST: CPT

## 2019-01-17 PROCEDURE — 97162 PT EVAL MOD COMPLEX 30 MIN: CPT

## 2019-01-17 PROCEDURE — 80048 BASIC METABOLIC PNL TOTAL CA: CPT

## 2019-01-17 PROCEDURE — 85027 COMPLETE CBC AUTOMATED: CPT

## 2019-01-17 PROCEDURE — 36415 COLL VENOUS BLD VENIPUNCTURE: CPT

## 2019-01-17 PROCEDURE — 2580000003 HC RX 258: Performed by: INTERNAL MEDICINE

## 2019-01-17 PROCEDURE — 97116 GAIT TRAINING THERAPY: CPT

## 2019-01-17 PROCEDURE — 97535 SELF CARE MNGMENT TRAINING: CPT

## 2019-01-17 PROCEDURE — 94761 N-INVAS EAR/PLS OXIMETRY MLT: CPT

## 2019-01-17 PROCEDURE — 97150 GROUP THERAPEUTIC PROCEDURES: CPT

## 2019-01-17 RX ADMIN — DILTIAZEM HYDROCHLORIDE 30 MG: 30 TABLET, FILM COATED ORAL at 17:19

## 2019-01-17 RX ADMIN — Medication 10 ML: at 14:59

## 2019-01-17 RX ADMIN — Medication 500 MG: at 17:26

## 2019-01-17 RX ADMIN — GABAPENTIN 100 MG: 100 CAPSULE ORAL at 14:58

## 2019-01-17 RX ADMIN — DILTIAZEM HYDROCHLORIDE 30 MG: 30 TABLET, FILM COATED ORAL at 10:55

## 2019-01-17 RX ADMIN — GABAPENTIN 100 MG: 100 CAPSULE ORAL at 10:39

## 2019-01-17 RX ADMIN — LOPERAMIDE HYDROCHLORIDE 2 MG: 2 CAPSULE ORAL at 10:41

## 2019-01-17 RX ADMIN — Medication 500 MG: at 11:00

## 2019-01-17 RX ADMIN — ISOSORBIDE MONONITRATE 30 MG: 30 TABLET, EXTENDED RELEASE ORAL at 10:39

## 2019-01-17 RX ADMIN — DILTIAZEM HYDROCHLORIDE 30 MG: 30 TABLET, FILM COATED ORAL at 05:52

## 2019-01-17 RX ADMIN — Medication 500 MG: at 21:14

## 2019-01-17 RX ADMIN — RIVAROXABAN 15 MG: 15 TABLET, FILM COATED ORAL at 17:19

## 2019-01-17 RX ADMIN — DILTIAZEM HYDROCHLORIDE 30 MG: 30 TABLET, FILM COATED ORAL at 01:41

## 2019-01-17 RX ADMIN — GABAPENTIN 100 MG: 100 CAPSULE ORAL at 21:14

## 2019-01-17 RX ADMIN — Medication 10 ML: at 21:15

## 2019-01-17 RX ADMIN — Medication 500 MG: at 05:52

## 2019-01-17 RX ADMIN — INSULIN LISPRO 1 UNITS: 100 INJECTION, SOLUTION INTRAVENOUS; SUBCUTANEOUS at 12:51

## 2019-01-17 RX ADMIN — Medication 1 CAPSULE: at 10:41

## 2019-01-18 LAB
ANION GAP SERPL CALCULATED.3IONS-SCNC: 13 MMOL/L (ref 4–16)
BUN BLDV-MCNC: 35 MG/DL (ref 6–23)
CALCIUM SERPL-MCNC: 7.6 MG/DL (ref 8.3–10.6)
CHLORIDE BLD-SCNC: 104 MMOL/L (ref 99–110)
CO2: 19 MMOL/L (ref 21–32)
CREAT SERPL-MCNC: 1.8 MG/DL (ref 0.6–1.1)
GFR AFRICAN AMERICAN: 32 ML/MIN/1.73M2
GFR NON-AFRICAN AMERICAN: 27 ML/MIN/1.73M2
GLUCOSE BLD-MCNC: 106 MG/DL (ref 70–99)
GLUCOSE BLD-MCNC: 99 MG/DL (ref 70–99)
HCT VFR BLD CALC: 34 % (ref 37–47)
HEMOGLOBIN: 10 GM/DL (ref 12.5–16)
MCH RBC QN AUTO: 30.8 PG (ref 27–31)
MCHC RBC AUTO-ENTMCNC: 29.4 % (ref 32–36)
MCV RBC AUTO: 104.6 FL (ref 78–100)
PDW BLD-RTO: 15.3 % (ref 11.7–14.9)
PLATELET # BLD: 317 K/CU MM (ref 140–440)
PMV BLD AUTO: 9.2 FL (ref 7.5–11.1)
POTASSIUM SERPL-SCNC: 4.6 MMOL/L (ref 3.5–5.1)
RBC # BLD: 3.25 M/CU MM (ref 4.2–5.4)
SODIUM BLD-SCNC: 136 MMOL/L (ref 135–145)
WBC # BLD: 9.3 K/CU MM (ref 4–10.5)

## 2019-01-18 PROCEDURE — 80048 BASIC METABOLIC PNL TOTAL CA: CPT

## 2019-01-18 PROCEDURE — 1280000000 HC REHAB R&B

## 2019-01-18 PROCEDURE — 92523 SPEECH SOUND LANG COMPREHEN: CPT

## 2019-01-18 PROCEDURE — 97116 GAIT TRAINING THERAPY: CPT

## 2019-01-18 PROCEDURE — 97530 THERAPEUTIC ACTIVITIES: CPT

## 2019-01-18 PROCEDURE — 85027 COMPLETE CBC AUTOMATED: CPT

## 2019-01-18 PROCEDURE — 6370000000 HC RX 637 (ALT 250 FOR IP): Performed by: INTERNAL MEDICINE

## 2019-01-18 PROCEDURE — 94150 VITAL CAPACITY TEST: CPT

## 2019-01-18 PROCEDURE — 2580000003 HC RX 258: Performed by: INTERNAL MEDICINE

## 2019-01-18 PROCEDURE — 36415 COLL VENOUS BLD VENIPUNCTURE: CPT

## 2019-01-18 PROCEDURE — 94761 N-INVAS EAR/PLS OXIMETRY MLT: CPT

## 2019-01-18 PROCEDURE — 97150 GROUP THERAPEUTIC PROCEDURES: CPT

## 2019-01-18 PROCEDURE — 97110 THERAPEUTIC EXERCISES: CPT

## 2019-01-18 PROCEDURE — 82962 GLUCOSE BLOOD TEST: CPT

## 2019-01-18 RX ORDER — ONDANSETRON 4 MG/1
4 TABLET, ORALLY DISINTEGRATING ORAL 4 TIMES DAILY PRN
Status: DISCONTINUED | OUTPATIENT
Start: 2019-01-18 | End: 2019-01-24 | Stop reason: HOSPADM

## 2019-01-18 RX ADMIN — Medication 1 CAPSULE: at 08:31

## 2019-01-18 RX ADMIN — Medication 10 ML: at 08:38

## 2019-01-18 RX ADMIN — Medication 500 MG: at 05:47

## 2019-01-18 RX ADMIN — Medication 500 MG: at 22:50

## 2019-01-18 RX ADMIN — DILTIAZEM HYDROCHLORIDE 30 MG: 30 TABLET, FILM COATED ORAL at 12:08

## 2019-01-18 RX ADMIN — RIVAROXABAN 15 MG: 15 TABLET, FILM COATED ORAL at 17:39

## 2019-01-18 RX ADMIN — DILTIAZEM HYDROCHLORIDE 30 MG: 30 TABLET, FILM COATED ORAL at 00:23

## 2019-01-18 RX ADMIN — Medication 500 MG: at 11:13

## 2019-01-18 RX ADMIN — DILTIAZEM HYDROCHLORIDE 30 MG: 30 TABLET, FILM COATED ORAL at 17:39

## 2019-01-18 RX ADMIN — Medication 10 ML: at 22:50

## 2019-01-18 RX ADMIN — GABAPENTIN 100 MG: 100 CAPSULE ORAL at 08:31

## 2019-01-18 RX ADMIN — ISOSORBIDE MONONITRATE 30 MG: 30 TABLET, EXTENDED RELEASE ORAL at 08:33

## 2019-01-18 RX ADMIN — DILTIAZEM HYDROCHLORIDE 30 MG: 30 TABLET, FILM COATED ORAL at 22:50

## 2019-01-18 RX ADMIN — Medication 500 MG: at 17:54

## 2019-01-18 RX ADMIN — DILTIAZEM HYDROCHLORIDE 30 MG: 30 TABLET, FILM COATED ORAL at 05:47

## 2019-01-19 LAB — GLUCOSE BLD-MCNC: 92 MG/DL (ref 70–99)

## 2019-01-19 PROCEDURE — 1280000000 HC REHAB R&B

## 2019-01-19 PROCEDURE — 97530 THERAPEUTIC ACTIVITIES: CPT

## 2019-01-19 PROCEDURE — 97542 WHEELCHAIR MNGMENT TRAINING: CPT

## 2019-01-19 PROCEDURE — 97150 GROUP THERAPEUTIC PROCEDURES: CPT

## 2019-01-19 PROCEDURE — 82962 GLUCOSE BLOOD TEST: CPT

## 2019-01-19 PROCEDURE — 6370000000 HC RX 637 (ALT 250 FOR IP): Performed by: INTERNAL MEDICINE

## 2019-01-19 PROCEDURE — 6360000002 HC RX W HCPCS: Performed by: PHYSICAL MEDICINE & REHABILITATION

## 2019-01-19 PROCEDURE — 2580000003 HC RX 258: Performed by: INTERNAL MEDICINE

## 2019-01-19 PROCEDURE — 97110 THERAPEUTIC EXERCISES: CPT

## 2019-01-19 PROCEDURE — 94761 N-INVAS EAR/PLS OXIMETRY MLT: CPT

## 2019-01-19 RX ADMIN — ONDANSETRON 4 MG: 4 TABLET, ORALLY DISINTEGRATING ORAL at 11:06

## 2019-01-19 RX ADMIN — Medication 10 ML: at 20:02

## 2019-01-19 RX ADMIN — RIVAROXABAN 15 MG: 15 TABLET, FILM COATED ORAL at 17:26

## 2019-01-19 RX ADMIN — Medication 500 MG: at 05:27

## 2019-01-19 RX ADMIN — Medication 10 ML: at 08:53

## 2019-01-19 RX ADMIN — DILTIAZEM HYDROCHLORIDE 30 MG: 30 TABLET, FILM COATED ORAL at 17:26

## 2019-01-19 RX ADMIN — DILTIAZEM HYDROCHLORIDE 30 MG: 30 TABLET, FILM COATED ORAL at 12:42

## 2019-01-19 RX ADMIN — DILTIAZEM HYDROCHLORIDE 30 MG: 30 TABLET, FILM COATED ORAL at 05:27

## 2019-01-19 RX ADMIN — ACETAMINOPHEN 650 MG: 325 TABLET ORAL at 20:02

## 2019-01-19 RX ADMIN — ISOSORBIDE MONONITRATE 30 MG: 30 TABLET, EXTENDED RELEASE ORAL at 08:52

## 2019-01-19 RX ADMIN — Medication 1 CAPSULE: at 08:52

## 2019-01-19 ASSESSMENT — PAIN SCALES - GENERAL: PAINLEVEL_OUTOF10: 3

## 2019-01-20 LAB — GLUCOSE BLD-MCNC: 92 MG/DL (ref 70–99)

## 2019-01-20 PROCEDURE — 82962 GLUCOSE BLOOD TEST: CPT

## 2019-01-20 PROCEDURE — 6360000002 HC RX W HCPCS: Performed by: PHYSICAL MEDICINE & REHABILITATION

## 2019-01-20 PROCEDURE — 94150 VITAL CAPACITY TEST: CPT

## 2019-01-20 PROCEDURE — 1280000000 HC REHAB R&B

## 2019-01-20 PROCEDURE — 94761 N-INVAS EAR/PLS OXIMETRY MLT: CPT

## 2019-01-20 PROCEDURE — 6370000000 HC RX 637 (ALT 250 FOR IP): Performed by: INTERNAL MEDICINE

## 2019-01-20 PROCEDURE — 2580000003 HC RX 258: Performed by: INTERNAL MEDICINE

## 2019-01-20 RX ADMIN — DILTIAZEM HYDROCHLORIDE 30 MG: 30 TABLET, FILM COATED ORAL at 05:55

## 2019-01-20 RX ADMIN — RIVAROXABAN 15 MG: 15 TABLET, FILM COATED ORAL at 18:15

## 2019-01-20 RX ADMIN — ONDANSETRON 4 MG: 4 TABLET, ORALLY DISINTEGRATING ORAL at 16:28

## 2019-01-20 RX ADMIN — DILTIAZEM HYDROCHLORIDE 30 MG: 30 TABLET, FILM COATED ORAL at 13:35

## 2019-01-20 RX ADMIN — ACETAMINOPHEN 650 MG: 325 TABLET ORAL at 22:47

## 2019-01-20 RX ADMIN — Medication 10 ML: at 09:49

## 2019-01-20 RX ADMIN — ACETAMINOPHEN 650 MG: 325 TABLET ORAL at 18:16

## 2019-01-20 RX ADMIN — DILTIAZEM HYDROCHLORIDE 30 MG: 30 TABLET, FILM COATED ORAL at 00:34

## 2019-01-20 RX ADMIN — ISOSORBIDE MONONITRATE 30 MG: 30 TABLET, EXTENDED RELEASE ORAL at 09:47

## 2019-01-20 RX ADMIN — DILTIAZEM HYDROCHLORIDE 30 MG: 30 TABLET, FILM COATED ORAL at 18:16

## 2019-01-20 RX ADMIN — Medication 1 CAPSULE: at 09:46

## 2019-01-20 RX ADMIN — Medication 10 ML: at 22:28

## 2019-01-20 RX ADMIN — DILTIAZEM HYDROCHLORIDE 30 MG: 30 TABLET, FILM COATED ORAL at 23:59

## 2019-01-20 RX ADMIN — ACETAMINOPHEN 650 MG: 325 TABLET ORAL at 09:59

## 2019-01-20 ASSESSMENT — PAIN SCALES - GENERAL
PAINLEVEL_OUTOF10: 8
PAINLEVEL_OUTOF10: 6
PAINLEVEL_OUTOF10: 0
PAINLEVEL_OUTOF10: 5

## 2019-01-20 ASSESSMENT — PAIN DESCRIPTION - ORIENTATION: ORIENTATION: RIGHT;LEFT

## 2019-01-20 ASSESSMENT — PAIN DESCRIPTION - LOCATION: LOCATION: FOOT;THROAT

## 2019-01-21 LAB — GLUCOSE BLD-MCNC: 99 MG/DL (ref 70–99)

## 2019-01-21 PROCEDURE — 97535 SELF CARE MNGMENT TRAINING: CPT

## 2019-01-21 PROCEDURE — 6370000000 HC RX 637 (ALT 250 FOR IP): Performed by: PHYSICAL MEDICINE & REHABILITATION

## 2019-01-21 PROCEDURE — 6370000000 HC RX 637 (ALT 250 FOR IP): Performed by: INTERNAL MEDICINE

## 2019-01-21 PROCEDURE — 97530 THERAPEUTIC ACTIVITIES: CPT

## 2019-01-21 PROCEDURE — 94761 N-INVAS EAR/PLS OXIMETRY MLT: CPT

## 2019-01-21 PROCEDURE — 6360000002 HC RX W HCPCS: Performed by: PHYSICAL MEDICINE & REHABILITATION

## 2019-01-21 PROCEDURE — 82962 GLUCOSE BLOOD TEST: CPT

## 2019-01-21 PROCEDURE — 2580000003 HC RX 258: Performed by: INTERNAL MEDICINE

## 2019-01-21 PROCEDURE — 97110 THERAPEUTIC EXERCISES: CPT

## 2019-01-21 PROCEDURE — 1280000000 HC REHAB R&B

## 2019-01-21 PROCEDURE — 97116 GAIT TRAINING THERAPY: CPT

## 2019-01-21 PROCEDURE — 97150 GROUP THERAPEUTIC PROCEDURES: CPT

## 2019-01-21 RX ORDER — VALACYCLOVIR HYDROCHLORIDE 500 MG/1
500 TABLET, FILM COATED ORAL 2 TIMES DAILY
Status: ON HOLD | COMMUNITY
End: 2019-01-24 | Stop reason: HOSPADM

## 2019-01-21 RX ORDER — AMLODIPINE BESYLATE 5 MG/1
5 TABLET ORAL NIGHTLY
Status: ON HOLD | COMMUNITY
End: 2019-01-24 | Stop reason: HOSPADM

## 2019-01-21 RX ORDER — IRON ASPGLY,PS/C/B12/FA/CA/SUC 150-25-1
1 CAPSULE ORAL 2 TIMES DAILY WITH MEALS
Status: ON HOLD | COMMUNITY
End: 2019-01-24 | Stop reason: HOSPADM

## 2019-01-21 RX ADMIN — DILTIAZEM HYDROCHLORIDE 30 MG: 30 TABLET, FILM COATED ORAL at 05:56

## 2019-01-21 RX ADMIN — ONDANSETRON 4 MG: 4 TABLET, ORALLY DISINTEGRATING ORAL at 17:45

## 2019-01-21 RX ADMIN — ISOSORBIDE MONONITRATE 30 MG: 30 TABLET, EXTENDED RELEASE ORAL at 08:41

## 2019-01-21 RX ADMIN — RIVAROXABAN 15 MG: 15 TABLET, FILM COATED ORAL at 17:41

## 2019-01-21 RX ADMIN — Medication 1 CAPSULE: at 08:41

## 2019-01-21 RX ADMIN — NYSTATIN 500000 UNITS: 500000 SUSPENSION ORAL at 20:31

## 2019-01-21 RX ADMIN — ACETAMINOPHEN 650 MG: 325 TABLET ORAL at 06:02

## 2019-01-21 RX ADMIN — Medication 10 ML: at 20:32

## 2019-01-21 RX ADMIN — Medication 10 ML: at 08:44

## 2019-01-21 RX ADMIN — DILTIAZEM HYDROCHLORIDE 30 MG: 30 TABLET, FILM COATED ORAL at 12:28

## 2019-01-21 RX ADMIN — DILTIAZEM HYDROCHLORIDE 30 MG: 30 TABLET, FILM COATED ORAL at 17:45

## 2019-01-21 RX ADMIN — NYSTATIN 500000 UNITS: 500000 SUSPENSION ORAL at 15:09

## 2019-01-21 RX ADMIN — ONDANSETRON 4 MG: 4 TABLET, ORALLY DISINTEGRATING ORAL at 08:40

## 2019-01-21 ASSESSMENT — PAIN SCALES - GENERAL
PAINLEVEL_OUTOF10: 0
PAINLEVEL_OUTOF10: 0
PAINLEVEL_OUTOF10: 8
PAINLEVEL_OUTOF10: 0
PAINLEVEL_OUTOF10: 0

## 2019-01-21 ASSESSMENT — PAIN DESCRIPTION - ORIENTATION: ORIENTATION: RIGHT;LEFT

## 2019-01-21 ASSESSMENT — PAIN DESCRIPTION - LOCATION: LOCATION: FOOT;THROAT

## 2019-01-22 LAB
ANION GAP SERPL CALCULATED.3IONS-SCNC: 10 MMOL/L (ref 4–16)
BUN BLDV-MCNC: 20 MG/DL (ref 6–23)
CALCIUM SERPL-MCNC: 8.3 MG/DL (ref 8.3–10.6)
CHLORIDE BLD-SCNC: 104 MMOL/L (ref 99–110)
CO2: 28 MMOL/L (ref 21–32)
CREAT SERPL-MCNC: 1.9 MG/DL (ref 0.6–1.1)
GFR AFRICAN AMERICAN: 30 ML/MIN/1.73M2
GFR NON-AFRICAN AMERICAN: 25 ML/MIN/1.73M2
GLUCOSE BLD-MCNC: 86 MG/DL (ref 70–99)
GLUCOSE BLD-MCNC: 89 MG/DL (ref 70–99)
HCT VFR BLD CALC: 37.7 % (ref 37–47)
HEMOGLOBIN: 10.9 GM/DL (ref 12.5–16)
MCH RBC QN AUTO: 30.2 PG (ref 27–31)
MCHC RBC AUTO-ENTMCNC: 28.9 % (ref 32–36)
MCV RBC AUTO: 104.4 FL (ref 78–100)
PDW BLD-RTO: 14.6 % (ref 11.7–14.9)
PLATELET # BLD: 373 K/CU MM (ref 140–440)
PMV BLD AUTO: 9.2 FL (ref 7.5–11.1)
POTASSIUM SERPL-SCNC: 5.1 MMOL/L (ref 3.5–5.1)
RBC # BLD: 3.61 M/CU MM (ref 4.2–5.4)
SODIUM BLD-SCNC: 142 MMOL/L (ref 135–145)
WBC # BLD: 6.4 K/CU MM (ref 4–10.5)

## 2019-01-22 PROCEDURE — 97150 GROUP THERAPEUTIC PROCEDURES: CPT

## 2019-01-22 PROCEDURE — 97110 THERAPEUTIC EXERCISES: CPT

## 2019-01-22 PROCEDURE — 85027 COMPLETE CBC AUTOMATED: CPT

## 2019-01-22 PROCEDURE — 97535 SELF CARE MNGMENT TRAINING: CPT

## 2019-01-22 PROCEDURE — 97530 THERAPEUTIC ACTIVITIES: CPT

## 2019-01-22 PROCEDURE — 6370000000 HC RX 637 (ALT 250 FOR IP): Performed by: PHYSICAL MEDICINE & REHABILITATION

## 2019-01-22 PROCEDURE — 97116 GAIT TRAINING THERAPY: CPT

## 2019-01-22 PROCEDURE — 80048 BASIC METABOLIC PNL TOTAL CA: CPT

## 2019-01-22 PROCEDURE — 82962 GLUCOSE BLOOD TEST: CPT

## 2019-01-22 PROCEDURE — 1280000000 HC REHAB R&B

## 2019-01-22 PROCEDURE — 94150 VITAL CAPACITY TEST: CPT

## 2019-01-22 PROCEDURE — 36415 COLL VENOUS BLD VENIPUNCTURE: CPT

## 2019-01-22 PROCEDURE — 6370000000 HC RX 637 (ALT 250 FOR IP): Performed by: INTERNAL MEDICINE

## 2019-01-22 RX ADMIN — DILTIAZEM HYDROCHLORIDE 30 MG: 30 TABLET, FILM COATED ORAL at 05:51

## 2019-01-22 RX ADMIN — DILTIAZEM HYDROCHLORIDE 30 MG: 30 TABLET, FILM COATED ORAL at 17:13

## 2019-01-22 RX ADMIN — RIVAROXABAN 15 MG: 15 TABLET, FILM COATED ORAL at 17:13

## 2019-01-22 RX ADMIN — NYSTATIN 500000 UNITS: 500000 SUSPENSION ORAL at 13:06

## 2019-01-22 RX ADMIN — DILTIAZEM HYDROCHLORIDE 30 MG: 30 TABLET, FILM COATED ORAL at 23:03

## 2019-01-22 RX ADMIN — ISOSORBIDE MONONITRATE 30 MG: 30 TABLET, EXTENDED RELEASE ORAL at 09:39

## 2019-01-22 RX ADMIN — NYSTATIN 500000 UNITS: 500000 SUSPENSION ORAL at 09:38

## 2019-01-22 RX ADMIN — NYSTATIN 500000 UNITS: 500000 SUSPENSION ORAL at 17:13

## 2019-01-22 RX ADMIN — DILTIAZEM HYDROCHLORIDE 30 MG: 30 TABLET, FILM COATED ORAL at 00:48

## 2019-01-22 RX ADMIN — NYSTATIN 500000 UNITS: 500000 SUSPENSION ORAL at 23:02

## 2019-01-22 RX ADMIN — DILTIAZEM HYDROCHLORIDE 30 MG: 30 TABLET, FILM COATED ORAL at 13:06

## 2019-01-23 PROCEDURE — 97530 THERAPEUTIC ACTIVITIES: CPT

## 2019-01-23 PROCEDURE — 97535 SELF CARE MNGMENT TRAINING: CPT

## 2019-01-23 PROCEDURE — 6370000000 HC RX 637 (ALT 250 FOR IP): Performed by: PHYSICAL MEDICINE & REHABILITATION

## 2019-01-23 PROCEDURE — 6370000000 HC RX 637 (ALT 250 FOR IP): Performed by: INTERNAL MEDICINE

## 2019-01-23 PROCEDURE — 1280000000 HC REHAB R&B

## 2019-01-23 PROCEDURE — 6360000002 HC RX W HCPCS: Performed by: PHYSICAL MEDICINE & REHABILITATION

## 2019-01-23 PROCEDURE — 94761 N-INVAS EAR/PLS OXIMETRY MLT: CPT

## 2019-01-23 PROCEDURE — 51798 US URINE CAPACITY MEASURE: CPT

## 2019-01-23 PROCEDURE — 97150 GROUP THERAPEUTIC PROCEDURES: CPT

## 2019-01-23 PROCEDURE — 97116 GAIT TRAINING THERAPY: CPT

## 2019-01-23 RX ORDER — DIPHENHYDRAMINE HYDROCHLORIDE, ZINC ACETATE 2; .1 G/100G; G/100G
CREAM TOPICAL 3 TIMES DAILY PRN
Status: DISCONTINUED | OUTPATIENT
Start: 2019-01-23 | End: 2019-01-24 | Stop reason: HOSPADM

## 2019-01-23 RX ADMIN — NYSTATIN 500000 UNITS: 500000 SUSPENSION ORAL at 20:08

## 2019-01-23 RX ADMIN — ISOSORBIDE MONONITRATE 30 MG: 30 TABLET, EXTENDED RELEASE ORAL at 08:33

## 2019-01-23 RX ADMIN — ONDANSETRON 4 MG: 4 TABLET, ORALLY DISINTEGRATING ORAL at 08:42

## 2019-01-23 RX ADMIN — DILTIAZEM HYDROCHLORIDE 30 MG: 30 TABLET, FILM COATED ORAL at 17:18

## 2019-01-23 RX ADMIN — DILTIAZEM HYDROCHLORIDE 30 MG: 30 TABLET, FILM COATED ORAL at 05:00

## 2019-01-23 RX ADMIN — ACETAMINOPHEN 650 MG: 325 TABLET ORAL at 20:09

## 2019-01-23 RX ADMIN — RIVAROXABAN 15 MG: 15 TABLET, FILM COATED ORAL at 17:18

## 2019-01-23 RX ADMIN — DIPHENHYDRAMINE HYDROCHLORIDE, ZINC ACETATE: 2; .1 CREAM TOPICAL at 20:09

## 2019-01-23 RX ADMIN — DILTIAZEM HYDROCHLORIDE 30 MG: 30 TABLET, FILM COATED ORAL at 12:40

## 2019-01-23 RX ADMIN — ACETAMINOPHEN 650 MG: 325 TABLET ORAL at 03:55

## 2019-01-23 RX ADMIN — Medication 1 CAPSULE: at 08:33

## 2019-01-23 RX ADMIN — NYSTATIN 500000 UNITS: 500000 SUSPENSION ORAL at 08:33

## 2019-01-23 ASSESSMENT — PAIN SCALES - GENERAL
PAINLEVEL_OUTOF10: 10
PAINLEVEL_OUTOF10: 10
PAINLEVEL_OUTOF10: 6

## 2019-01-23 ASSESSMENT — PAIN DESCRIPTION - LOCATION: LOCATION: GENERALIZED

## 2019-01-24 VITALS
RESPIRATION RATE: 16 BRPM | WEIGHT: 136.8 LBS | SYSTOLIC BLOOD PRESSURE: 182 MMHG | TEMPERATURE: 98.2 F | BODY MASS INDEX: 22.79 KG/M2 | DIASTOLIC BLOOD PRESSURE: 72 MMHG | HEIGHT: 65 IN | OXYGEN SATURATION: 92 % | HEART RATE: 64 BPM

## 2019-01-24 LAB
ANION GAP SERPL CALCULATED.3IONS-SCNC: 11 MMOL/L (ref 4–16)
BUN BLDV-MCNC: 17 MG/DL (ref 6–23)
CALCIUM SERPL-MCNC: 8 MG/DL (ref 8.3–10.6)
CHLORIDE BLD-SCNC: 105 MMOL/L (ref 99–110)
CO2: 25 MMOL/L (ref 21–32)
CREAT SERPL-MCNC: 1.9 MG/DL (ref 0.6–1.1)
GFR AFRICAN AMERICAN: 30 ML/MIN/1.73M2
GFR NON-AFRICAN AMERICAN: 25 ML/MIN/1.73M2
GLUCOSE BLD-MCNC: 85 MG/DL (ref 70–99)
GLUCOSE BLD-MCNC: 91 MG/DL (ref 70–99)
HCT VFR BLD CALC: 33.3 % (ref 37–47)
HEMOGLOBIN: 10 GM/DL (ref 12.5–16)
MCH RBC QN AUTO: 31 PG (ref 27–31)
MCHC RBC AUTO-ENTMCNC: 30 % (ref 32–36)
MCV RBC AUTO: 103.1 FL (ref 78–100)
PDW BLD-RTO: 14.6 % (ref 11.7–14.9)
PLATELET # BLD: 348 K/CU MM (ref 140–440)
PMV BLD AUTO: 9 FL (ref 7.5–11.1)
POTASSIUM SERPL-SCNC: 4.6 MMOL/L (ref 3.5–5.1)
RBC # BLD: 3.23 M/CU MM (ref 4.2–5.4)
SODIUM BLD-SCNC: 141 MMOL/L (ref 135–145)
WBC # BLD: 5.8 K/CU MM (ref 4–10.5)

## 2019-01-24 PROCEDURE — 80048 BASIC METABOLIC PNL TOTAL CA: CPT

## 2019-01-24 PROCEDURE — 6370000000 HC RX 637 (ALT 250 FOR IP): Performed by: INTERNAL MEDICINE

## 2019-01-24 PROCEDURE — 36415 COLL VENOUS BLD VENIPUNCTURE: CPT

## 2019-01-24 PROCEDURE — 82962 GLUCOSE BLOOD TEST: CPT

## 2019-01-24 PROCEDURE — 6370000000 HC RX 637 (ALT 250 FOR IP): Performed by: PHYSICAL MEDICINE & REHABILITATION

## 2019-01-24 PROCEDURE — 85027 COMPLETE CBC AUTOMATED: CPT

## 2019-01-24 RX ORDER — LACTOBACILLUS RHAMNOSUS GG 10B CELL
1 CAPSULE ORAL
Qty: 30 CAPSULE | Status: ON HOLD | COMMUNITY
Start: 2019-01-24 | End: 2019-03-13 | Stop reason: SDUPTHER

## 2019-01-24 RX ORDER — CARVEDILOL 6.25 MG/1
6.25 TABLET ORAL 2 TIMES DAILY WITH MEALS
Qty: 60 TABLET | Refills: 0 | Status: ON HOLD
Start: 2019-01-24 | End: 2019-04-17 | Stop reason: HOSPADM

## 2019-01-24 RX ORDER — LIDOCAINE 4 G/G
1 PATCH TOPICAL DAILY
Qty: 10 PATCH | Refills: 0
Start: 2019-01-24 | End: 2019-02-03

## 2019-01-24 RX ORDER — ISOSORBIDE MONONITRATE 30 MG/1
30 TABLET, EXTENDED RELEASE ORAL DAILY
Qty: 30 TABLET | Refills: 0
Start: 2019-01-24 | End: 2019-04-30 | Stop reason: SDUPTHER

## 2019-01-24 RX ORDER — CARVEDILOL 6.25 MG/1
6.25 TABLET ORAL 2 TIMES DAILY WITH MEALS
Status: DISCONTINUED | OUTPATIENT
Start: 2019-01-24 | End: 2019-01-24 | Stop reason: HOSPADM

## 2019-01-24 RX ADMIN — ISOSORBIDE MONONITRATE 30 MG: 30 TABLET, EXTENDED RELEASE ORAL at 08:53

## 2019-01-24 RX ADMIN — DILTIAZEM HYDROCHLORIDE 30 MG: 30 TABLET, FILM COATED ORAL at 05:57

## 2019-01-24 RX ADMIN — DILTIAZEM HYDROCHLORIDE 30 MG: 30 TABLET, FILM COATED ORAL at 00:07

## 2019-01-24 RX ADMIN — CARVEDILOL 6.25 MG: 6.25 TABLET, FILM COATED ORAL at 09:03

## 2019-03-07 ENCOUNTER — HOSPITAL ENCOUNTER (OUTPATIENT)
Age: 84
Setting detail: SPECIMEN
Discharge: HOME | DRG: 813 | End: 2019-03-07
Payer: MEDICARE

## 2019-03-07 LAB
ALBUMIN SERPL-MCNC: 3.5 GM/DL (ref 3.4–5)
ALP BLD-CCNC: 61 IU/L (ref 40–129)
ALT SERPL-CCNC: 9 U/L (ref 10–40)
ANION GAP SERPL CALCULATED.3IONS-SCNC: 16 MMOL/L (ref 4–16)
AST SERPL-CCNC: 12 IU/L (ref 15–37)
BILIRUB SERPL-MCNC: 0.2 MG/DL (ref 0–1)
BUN BLDV-MCNC: 27 MG/DL (ref 6–23)
CALCIUM SERPL-MCNC: 8.3 MG/DL (ref 8.3–10.6)
CHLORIDE BLD-SCNC: 98 MMOL/L (ref 99–110)
CO2: 20 MMOL/L (ref 21–32)
CREAT SERPL-MCNC: 1.5 MG/DL (ref 0.6–1.1)
ERYTHROCYTE SEDIMENTATION RATE: 113 MM/HR (ref 0–30)
FERRITIN: 1506 NG/ML (ref 15–150)
FOLATE: 15.6 NG/ML (ref 3.1–17.5)
GFR AFRICAN AMERICAN: 40 ML/MIN/1.73M2
GFR NON-AFRICAN AMERICAN: 33 ML/MIN/1.73M2
GLUCOSE BLD-MCNC: 115 MG/DL (ref 70–99)
IRON: 17 UG/DL (ref 37–145)
LACTATE DEHYDROGENASE: 152 IU/L (ref 120–246)
PCT TRANSFERRIN: 10 % (ref 10–44)
POTASSIUM SERPL-SCNC: 3.9 MMOL/L (ref 3.5–5.1)
RETICULOCYTE COUNT PCT: 1.1 % (ref 0.2–2.2)
SODIUM BLD-SCNC: 134 MMOL/L (ref 135–145)
TOTAL IRON BINDING CAPACITY: 169 UG/DL (ref 250–450)
TOTAL PROTEIN: 6.5 GM/DL (ref 6.4–8.2)
UNSATURATED IRON BINDING CAPACITY: 152 UG/DL (ref 110–370)
VITAMIN B-12: 1222 PG/ML (ref 211–911)

## 2019-03-07 PROCEDURE — 82746 ASSAY OF FOLIC ACID SERUM: CPT

## 2019-03-07 PROCEDURE — 80053 COMPREHEN METABOLIC PANEL: CPT

## 2019-03-07 PROCEDURE — 85652 RBC SED RATE AUTOMATED: CPT

## 2019-03-07 PROCEDURE — 83540 ASSAY OF IRON: CPT

## 2019-03-07 PROCEDURE — 83615 LACTATE (LD) (LDH) ENZYME: CPT

## 2019-03-07 PROCEDURE — 83010 ASSAY OF HAPTOGLOBIN QUANT: CPT

## 2019-03-07 PROCEDURE — 83550 IRON BINDING TEST: CPT

## 2019-03-07 PROCEDURE — 85045 AUTOMATED RETICULOCYTE COUNT: CPT

## 2019-03-07 PROCEDURE — 82728 ASSAY OF FERRITIN: CPT

## 2019-03-07 PROCEDURE — 82607 VITAMIN B-12: CPT

## 2019-03-07 PROCEDURE — 84165 PROTEIN E-PHORESIS SERUM: CPT

## 2019-03-08 ENCOUNTER — APPOINTMENT (OUTPATIENT)
Dept: CT IMAGING | Age: 84
DRG: 813 | End: 2019-03-08
Payer: MEDICARE

## 2019-03-08 ENCOUNTER — HOSPITAL ENCOUNTER (INPATIENT)
Age: 84
LOS: 5 days | Discharge: NURSING, REHAB OR LONG-TERM CARE | DRG: 813 | End: 2019-03-13
Attending: EMERGENCY MEDICINE | Admitting: INTERNAL MEDICINE
Payer: MEDICARE

## 2019-03-08 DIAGNOSIS — K92.2 GASTROINTESTINAL HEMORRHAGE, UNSPECIFIED GASTROINTESTINAL HEMORRHAGE TYPE: Primary | ICD-10-CM

## 2019-03-08 DIAGNOSIS — D64.9 ANEMIA, UNSPECIFIED TYPE: ICD-10-CM

## 2019-03-08 DIAGNOSIS — R51.9 ACUTE NONINTRACTABLE HEADACHE, UNSPECIFIED HEADACHE TYPE: ICD-10-CM

## 2019-03-08 DIAGNOSIS — Z79.01 ANTICOAGULATED: ICD-10-CM

## 2019-03-08 LAB
ALBUMIN ELP: 2.8 GM/DL (ref 3.2–5.6)
ALBUMIN SERPL-MCNC: 2.9 GM/DL (ref 3.4–5)
ALP BLD-CCNC: 52 IU/L (ref 40–129)
ALPHA-1-GLOBULIN: 0.5 GM/DL (ref 0.1–0.4)
ALPHA-2-GLOBULIN: 1.2 GM/DL (ref 0.4–1.2)
ALT SERPL-CCNC: 9 U/L (ref 10–40)
ANION GAP SERPL CALCULATED.3IONS-SCNC: 15 MMOL/L (ref 4–16)
APTT: 37.3 SECONDS (ref 21.2–33)
AST SERPL-CCNC: 13 IU/L (ref 15–37)
BASOPHILS ABSOLUTE: 0 K/CU MM
BASOPHILS RELATIVE PERCENT: 0.4 % (ref 0–1)
BETA GLOBULIN: 1.2 GM/DL (ref 0.5–1.3)
BILIRUB SERPL-MCNC: 0.2 MG/DL (ref 0–1)
BUN BLDV-MCNC: 22 MG/DL (ref 6–23)
CALCIUM SERPL-MCNC: 8.1 MG/DL (ref 8.3–10.6)
CHLORIDE BLD-SCNC: 98 MMOL/L (ref 99–110)
CO2: 19 MMOL/L (ref 21–32)
CREAT SERPL-MCNC: 1.6 MG/DL (ref 0.6–1.1)
DIFFERENTIAL TYPE: ABNORMAL
EOSINOPHILS ABSOLUTE: 0.1 K/CU MM
EOSINOPHILS RELATIVE PERCENT: 1.3 % (ref 0–3)
GAMMA GLOBULIN: 0.9 GM/DL (ref 0.5–1.6)
GFR AFRICAN AMERICAN: 37 ML/MIN/1.73M2
GFR NON-AFRICAN AMERICAN: 30 ML/MIN/1.73M2
GLUCOSE BLD-MCNC: 108 MG/DL (ref 70–99)
HCT VFR BLD CALC: 24.9 % (ref 37–47)
HCT VFR BLD CALC: 26.5 % (ref 37–47)
HEMOGLOBIN: 7.7 GM/DL (ref 12.5–16)
HEMOGLOBIN: 8.3 GM/DL (ref 12.5–16)
IMMATURE NEUTROPHIL %: 0.3 % (ref 0–0.43)
INR BLD: 1.39 INDEX
LACTATE: 0.6 MMOL/L (ref 0.4–2)
LYMPHOCYTES ABSOLUTE: 1.4 K/CU MM
LYMPHOCYTES RELATIVE PERCENT: 17.6 % (ref 24–44)
MCH RBC QN AUTO: 30 PG (ref 27–31)
MCHC RBC AUTO-ENTMCNC: 31.3 % (ref 32–36)
MCV RBC AUTO: 95.7 FL (ref 78–100)
MONOCYTES ABSOLUTE: 0.5 K/CU MM
MONOCYTES RELATIVE PERCENT: 6.3 % (ref 0–4)
NUCLEATED RBC %: 0 %
PDW BLD-RTO: 14.2 % (ref 11.7–14.9)
PLATELET # BLD: 295 K/CU MM (ref 140–440)
PMV BLD AUTO: 8.5 FL (ref 7.5–11.1)
POTASSIUM SERPL-SCNC: 3.3 MMOL/L (ref 3.5–5.1)
PROTHROMBIN TIME: 15.8 SECONDS (ref 9.12–12.5)
RBC # BLD: 2.77 M/CU MM (ref 4.2–5.4)
SEGMENTED NEUTROPHILS ABSOLUTE COUNT: 5.7 K/CU MM
SEGMENTED NEUTROPHILS RELATIVE PERCENT: 74.1 % (ref 36–66)
SODIUM BLD-SCNC: 132 MMOL/L (ref 135–145)
TOTAL IMMATURE NEUTOROPHIL: 0.02 K/CU MM
TOTAL NUCLEATED RBC: 0 K/CU MM
TOTAL PROTEIN: 6.5 GM/DL (ref 6.4–8.2)
TOTAL PROTEIN: 6.5 GM/DL (ref 6.4–8.2)
WBC # BLD: 7.7 K/CU MM (ref 4–10.5)

## 2019-03-08 PROCEDURE — 70496 CT ANGIOGRAPHY HEAD: CPT

## 2019-03-08 PROCEDURE — 86900 BLOOD TYPING SEROLOGIC ABO: CPT

## 2019-03-08 PROCEDURE — 70450 CT HEAD/BRAIN W/O DYE: CPT

## 2019-03-08 PROCEDURE — P9016 RBC LEUKOCYTES REDUCED: HCPCS

## 2019-03-08 PROCEDURE — 36415 COLL VENOUS BLD VENIPUNCTURE: CPT

## 2019-03-08 PROCEDURE — 2580000003 HC RX 258: Performed by: PHYSICIAN ASSISTANT

## 2019-03-08 PROCEDURE — 96375 TX/PRO/DX INJ NEW DRUG ADDON: CPT

## 2019-03-08 PROCEDURE — 93005 ELECTROCARDIOGRAM TRACING: CPT | Performed by: EMERGENCY MEDICINE

## 2019-03-08 PROCEDURE — C9113 INJ PANTOPRAZOLE SODIUM, VIA: HCPCS | Performed by: PHYSICIAN ASSISTANT

## 2019-03-08 PROCEDURE — 96374 THER/PROPH/DIAG INJ IV PUSH: CPT

## 2019-03-08 PROCEDURE — 70498 CT ANGIOGRAPHY NECK: CPT

## 2019-03-08 PROCEDURE — 85610 PROTHROMBIN TIME: CPT

## 2019-03-08 PROCEDURE — 6370000000 HC RX 637 (ALT 250 FOR IP): Performed by: INTERNAL MEDICINE

## 2019-03-08 PROCEDURE — 85014 HEMATOCRIT: CPT

## 2019-03-08 PROCEDURE — 85025 COMPLETE CBC W/AUTO DIFF WBC: CPT

## 2019-03-08 PROCEDURE — 85018 HEMOGLOBIN: CPT

## 2019-03-08 PROCEDURE — 86922 COMPATIBILITY TEST ANTIGLOB: CPT

## 2019-03-08 PROCEDURE — 6360000002 HC RX W HCPCS: Performed by: PHYSICIAN ASSISTANT

## 2019-03-08 PROCEDURE — 85730 THROMBOPLASTIN TIME PARTIAL: CPT

## 2019-03-08 PROCEDURE — 6360000004 HC RX CONTRAST MEDICATION: Performed by: PHYSICIAN ASSISTANT

## 2019-03-08 PROCEDURE — 86850 RBC ANTIBODY SCREEN: CPT

## 2019-03-08 PROCEDURE — 93010 ELECTROCARDIOGRAM REPORT: CPT | Performed by: INTERNAL MEDICINE

## 2019-03-08 PROCEDURE — 2580000003 HC RX 258: Performed by: INTERNAL MEDICINE

## 2019-03-08 PROCEDURE — 99285 EMERGENCY DEPT VISIT HI MDM: CPT

## 2019-03-08 PROCEDURE — 80053 COMPREHEN METABOLIC PANEL: CPT

## 2019-03-08 PROCEDURE — 86901 BLOOD TYPING SEROLOGIC RH(D): CPT

## 2019-03-08 PROCEDURE — 83605 ASSAY OF LACTIC ACID: CPT

## 2019-03-08 PROCEDURE — 2060000000 HC ICU INTERMEDIATE R&B

## 2019-03-08 RX ORDER — 0.9 % SODIUM CHLORIDE 0.9 %
500 INTRAVENOUS SOLUTION INTRAVENOUS ONCE
Status: COMPLETED | OUTPATIENT
Start: 2019-03-08 | End: 2019-03-08

## 2019-03-08 RX ORDER — SODIUM CHLORIDE 0.9 % (FLUSH) 0.9 %
10 SYRINGE (ML) INJECTION PRN
Status: DISCONTINUED | OUTPATIENT
Start: 2019-03-08 | End: 2019-03-13 | Stop reason: HOSPADM

## 2019-03-08 RX ORDER — LACTOBACILLUS RHAMNOSUS GG 10B CELL
1 CAPSULE ORAL
Status: DISCONTINUED | OUTPATIENT
Start: 2019-03-09 | End: 2019-03-10

## 2019-03-08 RX ORDER — SODIUM CHLORIDE, SODIUM LACTATE, POTASSIUM CHLORIDE, CALCIUM CHLORIDE 600; 310; 30; 20 MG/100ML; MG/100ML; MG/100ML; MG/100ML
INJECTION, SOLUTION INTRAVENOUS CONTINUOUS
Status: DISCONTINUED | OUTPATIENT
Start: 2019-03-08 | End: 2019-03-11

## 2019-03-08 RX ORDER — ONDANSETRON 2 MG/ML
4 INJECTION INTRAMUSCULAR; INTRAVENOUS EVERY 30 MIN PRN
Status: DISCONTINUED | OUTPATIENT
Start: 2019-03-08 | End: 2019-03-13 | Stop reason: HOSPADM

## 2019-03-08 RX ORDER — MORPHINE SULFATE 4 MG/ML
2 INJECTION, SOLUTION INTRAMUSCULAR; INTRAVENOUS EVERY 30 MIN PRN
Status: DISCONTINUED | OUTPATIENT
Start: 2019-03-08 | End: 2019-03-13 | Stop reason: HOSPADM

## 2019-03-08 RX ORDER — ACETAMINOPHEN 325 MG/1
650 TABLET ORAL EVERY 4 HOURS PRN
Status: DISCONTINUED | OUTPATIENT
Start: 2019-03-08 | End: 2019-03-13 | Stop reason: HOSPADM

## 2019-03-08 RX ORDER — TIZANIDINE 4 MG/1
4 TABLET ORAL EVERY 6 HOURS PRN
Status: DISCONTINUED | OUTPATIENT
Start: 2019-03-08 | End: 2019-03-10

## 2019-03-08 RX ORDER — CARVEDILOL 6.25 MG/1
6.25 TABLET ORAL 2 TIMES DAILY WITH MEALS
Status: DISCONTINUED | OUTPATIENT
Start: 2019-03-08 | End: 2019-03-09

## 2019-03-08 RX ORDER — PANTOPRAZOLE SODIUM 40 MG/10ML
40 INJECTION, POWDER, LYOPHILIZED, FOR SOLUTION INTRAVENOUS ONCE
Status: COMPLETED | OUTPATIENT
Start: 2019-03-08 | End: 2019-03-08

## 2019-03-08 RX ORDER — ISOSORBIDE MONONITRATE 30 MG/1
30 TABLET, EXTENDED RELEASE ORAL DAILY
Status: DISCONTINUED | OUTPATIENT
Start: 2019-03-09 | End: 2019-03-13 | Stop reason: HOSPADM

## 2019-03-08 RX ADMIN — PANTOPRAZOLE SODIUM 40 MG: 40 INJECTION, POWDER, FOR SOLUTION INTRAVENOUS at 17:50

## 2019-03-08 RX ADMIN — MORPHINE SULFATE 2 MG: 4 INJECTION INTRAVENOUS at 18:02

## 2019-03-08 RX ADMIN — IOPAMIDOL 75 ML: 755 INJECTION, SOLUTION INTRAVENOUS at 16:18

## 2019-03-08 RX ADMIN — CARVEDILOL 6.25 MG: 6.25 TABLET, FILM COATED ORAL at 20:33

## 2019-03-08 RX ADMIN — ONDANSETRON 4 MG: 2 INJECTION INTRAMUSCULAR; INTRAVENOUS at 18:02

## 2019-03-08 RX ADMIN — ACETAMINOPHEN 650 MG: 325 TABLET ORAL at 20:33

## 2019-03-08 RX ADMIN — SODIUM CHLORIDE 500 ML: 9 INJECTION, SOLUTION INTRAVENOUS at 16:36

## 2019-03-08 RX ADMIN — DILTIAZEM HYDROCHLORIDE 30 MG: 30 TABLET, FILM COATED ORAL at 20:34

## 2019-03-08 RX ADMIN — SODIUM CHLORIDE, PRESERVATIVE FREE 10 ML: 5 INJECTION INTRAVENOUS at 16:18

## 2019-03-08 RX ADMIN — SODIUM CHLORIDE, POTASSIUM CHLORIDE, SODIUM LACTATE AND CALCIUM CHLORIDE: 600; 310; 30; 20 INJECTION, SOLUTION INTRAVENOUS at 22:48

## 2019-03-08 ASSESSMENT — PAIN DESCRIPTION - ONSET: ONSET: ON-GOING

## 2019-03-08 ASSESSMENT — PAIN DESCRIPTION - DIRECTION: RADIATING_TOWARDS: SHOULDERS

## 2019-03-08 ASSESSMENT — PAIN DESCRIPTION - DESCRIPTORS: DESCRIPTORS: ACHING;SORE

## 2019-03-08 ASSESSMENT — PAIN SCALES - GENERAL
PAINLEVEL_OUTOF10: 6
PAINLEVEL_OUTOF10: 7
PAINLEVEL_OUTOF10: 8

## 2019-03-08 ASSESSMENT — PAIN DESCRIPTION - PAIN TYPE
TYPE: ACUTE PAIN
TYPE: ACUTE PAIN

## 2019-03-08 ASSESSMENT — PAIN - FUNCTIONAL ASSESSMENT: PAIN_FUNCTIONAL_ASSESSMENT: PREVENTS OR INTERFERES SOME ACTIVE ACTIVITIES AND ADLS

## 2019-03-08 ASSESSMENT — PAIN DESCRIPTION - PROGRESSION: CLINICAL_PROGRESSION: NOT CHANGED

## 2019-03-08 ASSESSMENT — PAIN DESCRIPTION - LOCATION
LOCATION: HEAD
LOCATION: NECK

## 2019-03-08 ASSESSMENT — PAIN DESCRIPTION - ORIENTATION: ORIENTATION: POSTERIOR

## 2019-03-08 ASSESSMENT — PAIN DESCRIPTION - FREQUENCY: FREQUENCY: CONTINUOUS

## 2019-03-08 NOTE — ED PROVIDER NOTES
Patient Identification  Haydee Delaney is a 80 y.o. female    Chief Complaint  Headache      HPI  (History provided by patient)  This is a 80 y.o. female who was brought in by EMS for chief complaint of HA. Onset was 3 days ago. She does not remember what she was doing when it began. Does not remember it began suddenly or not. Currently 7 out of 10, located over the posterior head with radiation to the neck, worse with neck movement. Reports neck is stiff. Does not remember any falls or head or neck injuries. Has not had headaches like this before. Reports that her bilateral vision has been slightly worse than usual, is having difficulty reading. No change in hearing. No difficulty speaking or swallowing. No fevers. On Xarelto. Notes BRBPR for the last few days, has h/o c diff colitis, required blood transfusions during recent admission due to anemia. REVIEW OF SYSTEMS    Constitutional:  Denies fever, chills  HENT:  Denies sore throat or ear pain. + neck pain  Eyes: Denies eye pain.  + vision changes  Cardiovascular:  Denies chest pain, syncope  Respiratory:  Denies shortness of breath, cough   GI:  Denies abdominal pain, nausea, vomiting.  + BRBPR  :  Denies dysuria, discharge  Musculoskeletal:  Denies back pain, joint pain  Skin:  Denies rash, pruritis  Neurologic:  Denies focal weakness, or sensory changes. + MORA    See HPI and nursing notes for additional information     I have reviewed the following nursing documentation:  Allergies: Allergies   Allergen Reactions    Lisinopril Anaphylaxis       Past medical history:  has a past medical history of Asthma, Cancer ([address] Diabetes ([address] Diabetes mellitus ([address] Hyperlipidemia, and Hypertension. Past surgical history:  has a past surgical history that includes Hysterectomy; eye surgery; Colonoscopy (6-24-14); and Appendectomy. Home medications:   Prior to Admission medications    Medication Sig Start Date End Date Taking? Authorizing Provider   Cholecalciferol (VITAMIN D3) 5000 units TABS Take by mouth daily   Yes Historical Provider, MD   acetaminophen (TYLENOL) 325 MG tablet Take 325 mg by mouth every 4 hours as needed for Pain (As needed)   Yes Historical Provider, MD   isosorbide mononitrate (IMDUR) 30 MG extended release tablet Take 1 tablet by mouth daily 1/24/19  Yes RONALDO Lynch MD   rivaroxaban (XARELTO) 15 MG TABS tablet Take 1 tablet by mouth Daily with supper 1/24/19  Yes RONALDO Lynch MD   carvedilol (COREG) 6.25 MG tablet Take 1 tablet by mouth 2 times daily (with meals) 1/24/19  Yes RONALDO Lynch MD   diltiazem (CARDIZEM) 30 MG tablet Take 1 tablet by mouth 4 times daily 1/24/19  Yes Gaurang Herrera MD   lactobacillus (CULTURELLE) capsule Take 1 capsule by mouth daily (with breakfast) 1/24/19   RONALDO Lynch MD       Social history:  reports that she has never smoked. She has never used smokeless tobacco. She reports that she drinks alcohol. She reports that she does not use drugs. Family history:    Family History   Problem Relation Age of Onset    Diabetes Mother     Heart Disease Mother     High Blood Pressure Mother     Diabetes Father     High Blood Pressure Father     Heart Disease Father          Exam  BP (!) 182/81   Pulse 84   Temp 98.1 °F (36.7 °C) (Oral)   Resp 18   Ht 5' 5\" (1.651 m)   Wt 128 lb 3.2 oz (58.2 kg)   SpO2 92%   BMI 21.33 kg/m²   Nursing note and vitals reviewed. Constitutional: Well developed, well nourished. No acute distress. HENT:      Head: Normocephalic and atraumatic. Pain reproducible palpation of the posterior scalp. Ears: External ears normal.      Nose: Nose normal.     Mouth: Membrane mucosa moist and pink. No posterior oropharynx erythema or tonsillar edema  Eyes: Anicteric sclera. No discharge, PERRL  Neck: Trachea midline. Diffusely tender throughout the bilateral trapezius, midline cervical spine.   Refuses range of motion secondary to pain. Cardiovascular: RRR, no murmurs, rubs, or gallops, radial pulses 2+ bilaterally. Pulmonary/Chest: Effort normal. No respiratory distress. CTAB. No stridor. No wheezes. No rales. Abdominal: Soft. Nontender to palpation. No distension. No guarding, rebound tenderness, or evidence of ascites. : No CVA tenderness. Musculoskeletal: Moves all extremities. No gross deformity. NEUROLOGICAL: Awake and alert. GCS 15. Cranial nerves 2-12 grossly intact. Strength 5/5 throughout. Light touch sensation intact throughout. Finger to nose WNL. Skin: Warm and dry. No rash. Psychiatric: Normal mood and affect. Behavior is normal.      Radiographs (if obtained):  [] The following radiograph was interpreted by myself in the absence of a radiologist:   [x] Radiologist's Report Reviewed:  CTA HEAD W CONTRAST   Final Result   60% stenosis of the proximal left cervical ICA. 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 No large vessel occlusion detected within the head or neck. CTA NECK W CONTRAST   Final Result   60% stenosis of the proximal left cervical ICA. 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 No large vessel occlusion detected within the head or neck. CT Head WO Contrast   Final Result   No acute intracranial abnormality. Diffuse atrophic changes with findings suggesting chronic microvascular   ischemia                Labs  Results for orders placed or performed during the hospital encounter of 03/08/19   C difficile Molecular/PCR   Result Value Ref Range    Clostridium difficile, PCR PATIENT IS POSITIVE FOR C DIFFICILE,      Clostridium difficile, PCR       (NOTE)  REFERENCE RANGE: NEGATIVE  NO ADDITIONAL SPECIMENS WILL BE ACCEPTED FOR 7 DAYS WITHOUT   PHYSICIAN OVERRIDE. PHYSICIAN MAY CALL LAB IF ADDITIONAL TESTING IS   REQUIRED. LAB WILL NOT ACCEPT ADDITIONAL SPECIMENS TO TEST FOR CURE.        CBC Auto Differential   Result Value Ref Range    WBC 7.7 4.0 - 10.5 K/CU MM    RBC 2.77 (L) 4.2 - 5.4 M/CU MM    Hemoglobin 8.3 (L) 12.5 - 16.0 GM/DL    Hematocrit 26.5 (L) 37 - 47 %    MCV 95.7 78 - 100 FL    MCH 30.0 27 - 31 PG    MCHC 31.3 (L) 32.0 - 36.0 %    RDW 14.2 11.7 - 14.9 %    Platelets 225 667 - 561 K/CU MM    MPV 8.5 7.5 - 11.1 FL    Differential Type AUTOMATED DIFFERENTIAL     Segs Relative 74.1 (H) 36 - 66 %    Lymphocytes % 17.6 (L) 24 - 44 %    Monocytes % 6.3 (H) 0 - 4 %    Eosinophils % 1.3 0 - 3 %    Basophils % 0.4 0 - 1 %    Segs Absolute 5.7 K/CU MM    Lymphocytes # 1.4 K/CU MM    Monocytes # 0.5 K/CU MM    Eosinophils # 0.1 K/CU MM    Basophils # 0.0 K/CU MM    Nucleated RBC % 0.0 %    Total Nucleated RBC 0.0 K/CU MM    Total Immature Neutrophil 0.02 K/CU MM    Immature Neutrophil % 0.3 0 - 0.43 %   CMP   Result Value Ref Range    Sodium 132 (L) 135 - 145 MMOL/L    Potassium 3.3 (L) 3.5 - 5.1 MMOL/L    Chloride 98 (L) 99 - 110 mMol/L    CO2 19 (L) 21 - 32 MMOL/L    BUN 22 6 - 23 MG/DL    CREATININE 1.6 (H) 0.6 - 1.1 MG/DL    Glucose 108 (H) 70 - 99 MG/DL    Calcium 8.1 (L) 8.3 - 10.6 MG/DL    Alb 2.9 (L) 3.4 - 5.0 GM/DL    Total Protein 6.5 6.4 - 8.2 GM/DL    Total Bilirubin 0.2 0.0 - 1.0 MG/DL    ALT 9 (L) 10 - 40 U/L    AST 13 (L) 15 - 37 IU/L    Alkaline Phosphatase 52 40 - 129 IU/L    GFR Non-African American 30 (L) >60 mL/min/1.73m2    GFR  37 (L) >60 mL/min/1.73m2    Anion Gap 15 4 - 16   Protime/INR & PTT   Result Value Ref Range    Protime 15.8 (H) 9.12 - 12.5 SECONDS    INR 1.39 INDEX    aPTT 37.3 (H) 21.2 - 33.0 SECONDS   Lactic Acid, Plasma   Result Value Ref Range    Lactate 0.6 0.4 - 2.0 mMOL/L   CBC Auto Differential   Result Value Ref Range    WBC 8.5 4.0 - 10.5 K/CU MM    RBC 2.76 (L) 4.2 - 5.4 M/CU MM    Hemoglobin 8.3 (L) 12.5 - 16.0 GM/DL    Hematocrit 27.3 (L) 37 - 47 %    MCV 98.9 78 - 100 FL    MCH 30.1 27 - 31 PG    MCHC 30.4 (L) 32.0 - 36.0 %    RDW 14.5 11.7 - 14.9 %    Platelets 214 171 - 152 K/CU MM    MPV 8.4 7.5 - 11.1 FL    Differential Type AUTOMATED DIFFERENTIAL     Segs Relative 77.3 (H) 36 - 66 %    Lymphocytes % 13.0 (L) 24 - 44 %    Monocytes % 7.1 (H) 0 - 4 %    Eosinophils % 1.5 0 - 3 %    Basophils % 0.5 0 - 1 %    Segs Absolute 6.5 K/CU MM    Lymphocytes # 1.1 K/CU MM    Monocytes # 0.6 K/CU MM    Eosinophils # 0.1 K/CU MM    Basophils # 0.0 K/CU MM    Nucleated RBC % 0.0 %    Total Nucleated RBC 0.0 K/CU MM    Total Immature Neutrophil 0.05 K/CU MM    Immature Neutrophil % 0.6 (H) 0 - 0.43 %   Basic Metabolic Panel   Result Value Ref Range    Sodium 134 (L) 135 - 145 MMOL/L    Potassium 3.4 (L) 3.5 - 5.1 MMOL/L    Chloride 103 99 - 110 mMol/L    CO2 20 (L) 21 - 32 MMOL/L    Anion Gap 11 4 - 16    BUN 20 6 - 23 MG/DL    CREATININE 1.7 (H) 0.6 - 1.1 MG/DL    Glucose 80 70 - 99 MG/DL    Calcium 8.1 (L) 8.3 - 10.6 MG/DL    GFR Non-African American 28 (L) >60 mL/min/1.73m2    GFR  34 (L) >60 mL/min/1.73m2   Protime/INR & PTT   Result Value Ref Range    Protime 14.9 (H) 9.12 - 12.5 SECONDS    INR 1.29 INDEX    aPTT 37.3 (H) 21.2 - 33.0 SECONDS   Hemoglobin and hematocrit, blood   Result Value Ref Range    Hemoglobin 7.7 (L) 12.5 - 16.0 GM/DL    Hematocrit 24.9 (L) 37 - 47 %   Hemoglobin and hematocrit, blood   Result Value Ref Range    Hemoglobin 7.9 (L) 12.5 - 16.0 GM/DL    Hematocrit 25.5 (L) 37 - 47 %   Hemoglobin and hematocrit, blood   Result Value Ref Range    Hemoglobin 7.8 (L) 12.5 - 16.0 GM/DL    Hematocrit 25.4 (L) 37 - 47 %   CBC Auto Differential   Result Value Ref Range    WBC 7.5 4.0 - 10.5 K/CU MM    RBC 2.50 (L) 4.2 - 5.4 M/CU MM    Hemoglobin 7.6 (L) 12.5 - 16.0 GM/DL    Hematocrit 24.7 (L) 37 - 47 %    MCV 98.8 78 - 100 FL    MCH 30.4 27 - 31 PG    MCHC 30.8 (L) 32.0 - 36.0 %    RDW 14.5 11.7 - 14.9 %    Platelets 566 474 - 536 K/CU MM    MPV 8.4 7.5 - 11.1 FL    Differential Type AUTOMATED DIFFERENTIAL     Segs Relative 75.3 Value Ref Range    Sodium 132 (L) 135 - 145 MMOL/L    Potassium 4.6 3.5 - 5.1 MMOL/L    Chloride 103 99 - 110 mMol/L    CO2 19 (L) 21 - 32 MMOL/L    Anion Gap 10 4 - 16    BUN 16 6 - 23 MG/DL    CREATININE 1.5 (H) 0.6 - 1.1 MG/DL    Glucose 100 (H) 70 - 99 MG/DL    Calcium 8.2 (L) 8.3 - 10.6 MG/DL    GFR Non- 33 (L) >60 mL/min/1.73m2    GFR  40 (L) >60 mL/min/1.73m2   Lactic Acid, Plasma   Result Value Ref Range    Lactate 0.5 0.4 - 2.0 mMOL/L   Magnesium   Result Value Ref Range    Magnesium 3.5 (H) 1.8 - 2.4 mg/dl   IgG, IgA, IgM   Result Value Ref Range    IgG, Serum 746 723 - 1,685 MG/DL    IgA 296 69 - 382 MG/DL    IgM,Serum 67 62 - 277 MG/DL   CBC Auto Differential   Result Value Ref Range    WBC 8.3 4.0 - 10.5 K/CU MM    RBC 2.90 (L) 4.2 - 5.4 M/CU MM    Hemoglobin 8.7 (L) 12.5 - 16.0 GM/DL    Hematocrit 28.0 (L) 37 - 47 %    MCV 96.6 78 - 100 FL    MCH 30.0 27 - 31 PG    MCHC 31.1 (L) 32.0 - 36.0 %    RDW 15.3 (H) 11.7 - 14.9 %    Platelets 483 574 - 013 K/CU MM    MPV 8.8 7.5 - 11.1 FL    Differential Type AUTOMATED DIFFERENTIAL     Segs Relative 76.7 (H) 36 - 66 %    Lymphocytes % 14.6 (L) 24 - 44 %    Monocytes % 4.5 (H) 0 - 4 %    Eosinophils % 2.9 0 - 3 %    Basophils % 0.2 0 - 1 %    Segs Absolute 6.3 K/CU MM    Lymphocytes # 1.2 K/CU MM    Monocytes # 0.4 K/CU MM    Eosinophils # 0.2 K/CU MM    Basophils # 0.0 K/CU MM    Nucleated RBC % 0.0 %    Total Nucleated RBC 0.0 K/CU MM    Total Immature Neutrophil 0.09 K/CU MM    Immature Neutrophil % 1.1 (H) 0 - 0.43 %   Basic Metabolic Panel   Result Value Ref Range    Sodium 133 (L) 135 - 145 MMOL/L    Potassium 4.6 3.5 - 5.1 MMOL/L    Chloride 107 99 - 110 mMol/L    CO2 18 (L) 21 - 32 MMOL/L    Anion Gap 8 4 - 16    BUN 16 6 - 23 MG/DL    CREATININE 1.6 (H) 0.6 - 1.1 MG/DL    Glucose 107 (H) 70 - 99 MG/DL    Calcium 7.8 (L) 8.3 - 10.6 MG/DL    GFR Non-African American 30 (L) >60 mL/min/1.73m2    GFR  37 (L) >60 mL/min/1.73m2   Lactic Acid, Plasma   Result Value Ref Range    Lactate 0.7 0.4 - 2.0 mMOL/L   SPECIMEN REJECTION   Result Value Ref Range    Rejected Test [facility]     Reason for Rejection UNABLE TO PERFORM TESTING:     Reason for Rejection SPECIMEN CLOTTED    Hemoglobin and Hematocrit, Blood   Result Value Ref Range    Hemoglobin (LL) 12.5 - 16.0 GM/DL     6.4  HGB CALLED TO JOSE ANGLIN RN 4N ON [id number] AT 2032 NLUCAS MLT      Hematocrit 21.6 (L) 37 - 47 %   Hemoglobin and hematocrit, blood   Result Value Ref Range    Hemoglobin 8.1 (L) 12.5 - 16.0 GM/DL    Hematocrit 27.5 (L) 37 - 47 %   Hemoglobin and hematocrit, blood   Result Value Ref Range    Hemoglobin 8.5 (L) 12.5 - 16.0 GM/DL    Hematocrit 28.3 (L) 37 - 47 %   Magnesium   Result Value Ref Range    Magnesium 2.4 1.8 - 2.4 mg/dl   EKG 12 Lead   Result Value Ref Range    Ventricular Rate 81 BPM    Atrial Rate 100 BPM    QRS Duration 86 ms    Q-T Interval 388 ms    QTc Calculation (Bazett) 450 ms    R Axis -37 degrees    T Axis 86 degrees    Diagnosis       Atrial fibrillation  Left axis deviation  Septal infarct (cited on or before 10-HARPREET-2019)  Abnormal ECG  When compared with ECG of 10-HARPREET-2019 22:01,  Atrial fibrillation has replaced Sinus rhythm  Questionable change in initial forces of Anteroseptal leads  QT has lengthened  Confirmed by Marquise Kiser MD, Crista Mcfarlane ([id number]) on 3/11/2019 5:52:44 AM     TYPE AND SCREEN   Result Value Ref Range    ABO/Rh O NEGATIVE     Antibody Screen NEGATIVE     Unit Number [id number]     Component LEUKO-POOR RED CELLS     Unit Divison 00     Status ISSUED,FINAL     Transfusion Status OK TO TRANSFUSE     Crossmatch Result COMPATIBLE    TYPE AND SCREEN   Result Value Ref Range    ABO/Rh O NEGATIVE     Antibody Screen NEGATIVE          MDM  Patient presents for headache.   Seems musculoskeletal but is unusual for patient, therefore CTA was obtained after discussing risks and benefits including possibility of contrast-induced nephropathy with patient. CTA shows no findings that could be causing patient's current headache, does have moderate carotid disease and moderate to severe vertebral disease bilaterally. Labs also show a new worsened anemia, has dropped 2 g in the last month, states that she has had bright red blood per rectum for the last few days. She is on Xarelto for A. fib. She will require admission, discussed with Dr. Kanwal Falcon who will admit. Attempted to consult Dr. Josh Everett but unable to speak with him despite repeated pages. This patient was also seen and evaluated by Dr. Yan Bautista. Final Impression  1. Gastrointestinal hemorrhage, unspecified gastrointestinal hemorrhage type    2. Acute nonintractable headache, unspecified headache type    3. Anemia, unspecified type    4. Anticoagulated        Blood pressure (!) 182/81, pulse 84, temperature 98.1 °F (36.7 °C), temperature source Oral, resp. rate 18, height 5' 5\" (1.651 m), weight 128 lb 3.2 oz (58.2 kg), SpO2 92 %, not currently breastfeeding. Disposition:  Admit to med/surg floor in stable condition. Patient was given scripts for the following medications. I counseled patient how to take these medications. Current Discharge Medication List          This chart was generated using the [address] [place] [address] [address]  dictation system. I created this record but it may contain dictation errors given the limitations of this technology.        Bijal Murray PA-C  03/13/19 4255

## 2019-03-08 NOTE — ED NOTES
[phone] paged hospitalist     Varinder Hernández  03/08/19 1709  [phone] hospitalist returned call      Varinder Hernández  03/08/19 1717

## 2019-03-08 NOTE — ED NOTES
[phone] paged Dr Israel Mediate Dr Dayanara Mcnamara returned call      Manpreet Parker  03/08/19 [phone]

## 2019-03-08 NOTE — ED NOTES
[address] paged Dr Jacobs March 03/08/19 [phone]  [phone] Dr Cherry Terrell returned call      Corinne Lo  03/08/19 1843

## 2019-03-08 NOTE — ED PROVIDER NOTES
attempt lumbar puncture given patient's anticoagulated status. The patient also has lower GI bleed with drop in hemoglobin. She is not hemodynamically unstable. Plan to hold xarelto, trend hemoglobins. All diagnostic, treatment, and disposition decisions were made by myself in conjunction with the [facility]. For all further details of the patient's emergency department visit, please see their documentation.     (Please note that portions of this note may have been completed with a voice recognition program. Efforts were made to edit the dictations but occasionally words are mis-transcribed.)    MD Pk Greer MD  03/08/19 [phone]

## 2019-03-08 NOTE — ED NOTES
Bed: H-05  Expected date:   Expected time:   Means of arrival:   Comments:  Medic [address], [facility]0 [facility]  03/08/19 [id number]

## 2019-03-08 NOTE — H&P
Smokeless tobacco: Never Used   Substance and Sexual Activity    Alcohol use: Yes     Comment: socially only    Drug use: No    Sexual activity: Not on file   Lifestyle    Physical activity:     Days per week: Not on file     Minutes per session: Not on file    Stress: Not on file   Relationships    Social connections:     Talks on phone: Not on file     Gets together: Not on file     Attends Zoroastrianism service: Not on file     Active member of club or organization: Not on file     Attends meetings of clubs or organizations: Not on file     Relationship status: Not on file    Intimate partner violence:     Fear of current or ex partner: Not on file     Emotionally abused: Not on file     Physically abused: Not on file     Forced sexual activity: Not on file   Other Topics Concern    Not on file   Social History Narrative    Not on file       MEDICATIONS   Medications Prior to Admission  Not in a hospital admission.     Current Medications  Current Facility-Administered Medications   Medication Dose Route Frequency Provider Last Rate Last Dose    morphine sulfate (PF) injection 2 mg  2 mg Intravenous Q30 Min PRN Aneesh Hernandez PA-C        ondansetron (ZOFRAN) injection 4 mg  4 mg Intravenous Q30 Min PRN Aneesh Hernandez PA-C        0.9 % sodium chloride bolus  500 mL Intravenous Once Aneesh Hernandez PA-C 500 mL/hr at 03/08/19 1636 500 mL at 03/08/19 1636    sodium chloride flush 0.9 % injection 10 mL  10 mL Intravenous PRN Aneesh Hernandez PA-C   10 mL at 03/08/19 1618    pantoprazole (PROTONIX) injection 40 mg  40 mg Intravenous Once 120 [facility], PAJaylinC         Current Outpatient Medications   Medication Sig Dispense Refill    isosorbide mononitrate (IMDUR) 30 MG extended release tablet Take 1 tablet by mouth daily 30 tablet 0    rivaroxaban (XARELTO) 15 MG TABS tablet Take 1 tablet by mouth Daily with supper 30 tablet 0    lactobacillus (CULTURELLE) capsule Take 1 capsule by mouth daily (with breakfast) 30 capsule     carvedilol (COREG) 6.25 MG tablet Take 1 tablet by mouth 2 times daily (with meals) 60 tablet 0    diltiazem (CARDIZEM) 30 MG tablet Take 1 tablet by mouth 4 times daily 120 tablet 0         Allergies  Allergies   Allergen Reactions    Lisinopril Anaphylaxis       REVIEW OF SYSTEMS   Within above limitations. 14 point review of systems reviewed. Pertinent positive or negative as per HPI or otherwise negative per 14 point systems review. PHYSICAL EXAM     Wt Readings from Last 3 Encounters:   03/08/19 117 lb (53.1 kg)   01/21/19 136 lb 12.8 oz (62.1 kg)   01/16/19 129 lb 1.6 oz (58.6 kg)       Blood pressure (!) 152/93, pulse 81, resp. rate 27, height 5' 5\" (1.651 m), weight 117 lb (53.1 kg), SpO2 98 %, not currently breastfeeding. General - AAO x 3  Psych - Appropriate affect/speech. No agitation  Eyes - Eye lids intact. No scleral icterus  ENT - Lips wnl. External ear clear/dry/intact. No thyromegaly on inspection  Neuro - No gross peripheral or central neuro deficits on inspection  Heart - Sinus. RRR. S1 and S2 present. No elevated JVD appreciated  Lung - Adequate air entry b/l, No crackles/wheezes appreciated  GI - Soft. No guarding/rigidity. BS+   - No CVA/suprapubic tenderness or palpable bladder distension  Skin - Intact. No rash/petechiae/ecchymosis.  Warm extremities  MSK - stiffness of the neck muscle and lower occipital tenderness on tactile manipulation    LABS AND IMAGING   CBC  [unfilled]    Last 3 Hemoglobin  Lab Results   Component Value Date    HGB 8.3 03/08/2019    HGB 10.0 01/24/2019    HGB 10.9 01/22/2019     Last 3 WBC/ANC  Lab Results   Component Value Date    WBC 7.7 03/08/2019    WBC 5.8 01/24/2019    WBC 6.4 01/22/2019     No components found for: GRNLOCTYABS  Last 3 Platelets  No results found for: PLATELET  Chemistry  [unfilled]  [unfilled]  Lab Results   Component Value Date     03/07/2019     Coagulation Studies  Lab Results Component Value Date    INR 1.39 03/08/2019     Liver Function Studies  Lab Results   Component Value Date    ALT 9 03/08/2019    AST 13 03/08/2019    ALKPHOS 52 03/08/2019       Recent Imaging    Candido Castellanos #[id number] (XOL:[id number]) (80 y.o. F) (Adm: 03/08/19)    [facility] [id number]         Imaging Results (last 7 days)          Procedure Component Value Ref Range Date/Time     CTA NECK W CONTRAST [[id number]] Collected: 03/08/19 1632     Order Status: Completed Updated: 03/08/19 1641     Narrative:       EXAMINATION:  CTA OF THE HEAD WITH CONTRAST; CTA OF THE NECK 3/8/2019 4:26 pm; 3/8/2019  4:27 pm:    TECHNIQUE:  CTA of the head/brain was performed with the administration of intravenous  contrast. Multiplanar reformatted images are provided for review.  MIP images  are provided for review. Dose modulation, iterative reconstruction, and/or  weight based adjustment of the mA/kV was utilized to reduce the radiation  dose to as low as reasonably achievable.; CTA of the neck was performed with  the administration of intravenous contrast. Multiplanar reformatted images  are provided for review.  MIP images are provided for review. Stenosis of the  internal carotid arteries measured using NASCET criteria. Dose modulation,  iterative reconstruction, and/or weight based adjustment of the mA/kV was  utilized to reduce the radiation dose to as low as reasonably achievable. COMPARISON:  None. HISTORY:  ORDERING SYSTEM PROVIDED HISTORY: HA, new, neck pain  TECHNOLOGIST PROVIDED HISTORY:  Has a \"code stroke\" or \"stroke alert\" been called? ->No  Ordering Physician Provided Reason for Exam: headache, neck pain  Acuity: Unknown  Type of Exam: Unknown  Additional signs and symptoms: headache, neck pain  Relevant Medical/Surgical History: same contrast used for CTA NECK ; ORDERING  SYSTEM PROVIDED HISTORY: HA, new, neck pain  TECHNOLOGIST PROVIDED HISTORY:  Has a \"code stroke\" or \"stroke alert\" been called? ->No  Ordering Physician Provided Reason for Exam: headache  Acuity: Unknown  Type of Exam: Unknown  Additional signs and symptoms: pt states headache and neck stiffness  Relevant Medical/Surgical History: 75 ml isovue 370    FINDINGS:    CTA NECK:    AORTIC ARCH/ARCH VESSELS: There is a normal branch pattern of the aortic  arch. No significant stenosis is seen of the innominate artery or subclavian  arteries. CAROTID ARTERIES: The common carotid arteries are normal in appearance  without evidence of a flow limiting stenosis.  There is 60% stenosis of the  proximal left cervical ICA due to calcified plaque.  No dissection or  arterial injury is seen. VERTEBRAL ARTERIES: The vertebral arteries both arise from the subclavian  arteries and are normal in caliber without evidence of flow limiting stenosis  or dissection. SOFT TISSUES: The lung apices are clear.  No cervical or superior mediastinal  lymphadenopathy.  The visualized portion of the larynx and pharynx appear  unremarkable.  The parotid, submandibular and thyroid glands demonstrate no  acute abnormality. BONES: Degenerative changes of the spine are noted. CTA HEAD:    ANTERIOR CIRCULATION: The internal carotid arteries are normal in course and  caliber without focal stenosis. The anterior cerebral and middle cerebral  arteries demonstrate no focal stenosis.  There are bilateral posterior  communicating arteries. POSTERIOR CIRCULATION: The posterior cerebral arteries demonstrate no focal  stenosis.  There are moderate to severe tandem stenosis of the distal  vertebral arteries bilaterally.  The basilar artery is normal in course and  caliber. BRAIN: See separately dictated noncontrast head CT report.     Impression:       60% stenosis of the proximal left cervical ICA. 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     No large vessel occlusion detected within the head or neck.     CTA HEAD W CONTRAST [[id number]] Collected: 03/08/19 1632     Order Status: Completed Updated: 03/08/19 1641     Narrative:       EXAMINATION:  CTA OF THE HEAD WITH CONTRAST; CTA OF THE NECK 3/8/2019 4:26 pm; 3/8/2019  4:27 pm:    TECHNIQUE:  CTA of the head/brain was performed with the administration of intravenous  contrast. Multiplanar reformatted images are provided for review.  MIP images  are provided for review. Dose modulation, iterative reconstruction, and/or  weight based adjustment of the mA/kV was utilized to reduce the radiation  dose to as low as reasonably achievable.; CTA of the neck was performed with  the administration of intravenous contrast. Multiplanar reformatted images  are provided for review.  MIP images are provided for review. Stenosis of the  internal carotid arteries measured using NASCET criteria. Dose modulation,  iterative reconstruction, and/or weight based adjustment of the mA/kV was  utilized to reduce the radiation dose to as low as reasonably achievable. COMPARISON:  None. HISTORY:  ORDERING SYSTEM PROVIDED HISTORY: HA, new, neck pain  TECHNOLOGIST PROVIDED HISTORY:  Has a \"code stroke\" or \"stroke alert\" been called? ->No  Ordering Physician Provided Reason for Exam: headache, neck pain  Acuity: Unknown  Type of Exam: Unknown  Additional signs and symptoms: headache, neck pain  Relevant Medical/Surgical History: same contrast used for CTA NECK ; ORDERING  SYSTEM PROVIDED HISTORY: HA, new, neck pain  TECHNOLOGIST PROVIDED HISTORY:  Has a \"code stroke\" or \"stroke alert\" been called? ->No  Ordering Physician Provided Reason for Exam: headache  Acuity: Unknown  Type of Exam: Unknown  Additional signs and symptoms: pt states headache and neck stiffness  Relevant Medical/Surgical History: 75 ml isovue 370    FINDINGS:    CTA NECK:    AORTIC ARCH/ARCH VESSELS: There is a normal branch pattern of the aortic  arch.  No significant stenosis is seen of the innominate artery or as low  as reasonably achievable. COMPARISON:  None. HISTORY:  ORDERING SYSTEM PROVIDED HISTORY: HA, new  TECHNOLOGIST PROVIDED HISTORY:  Has a \"code stroke\" or \"stroke alert\" been called? ->No  Ordering Physician Provided Reason for Exam: HEADACHE  Acuity: Unknown  Type of Exam: Unknown  Additional signs and symptoms: PT STATES HEADACHE AD NECK STIFFNESS  Relevant Medical/Surgical History: NONE    FINDINGS:  BRAIN/VENTRICLES: The ventricles and sulci are diffusely enlarged.  Low  attenuation is seen in the periventricular and subcortical white matter.  No  acute intracranial hemorrhage or acute infarct is identified. ORBITS: The visualized portion of the orbits demonstrate no acute abnormality. SINUSES: The visualized paranasal sinuses and mastoid air cells demonstrate  no acute abnormality. SOFT TISSUES/SKULL:  No acute abnormality of the visualized skull or soft  tissues.     Impression:       No acute intracranial abnormality. Diffuse atrophic changes with findings suggesting chronic microvascular  ischemia               Relevant labs and imaging reviewed    ASSESSMENT AND PLAN     BRBPR, acute blood loss anemia  - consult GI  - trend Hb q6  - NPO  - IVF  - type and screen  - type and screen, monitor for more clinical bleeding      Torticollis  - PT/OT  - prn zanaflex  - heating pad    Recent hx of C.diff colitis  AFib on xarelto - now ran out 2 days ago.  She reports visiting with Dr Asad Bernal  Chronic diastolic CHF  CKD    SCD    Case d/w ED physician    [address] [place] [address], Internal Medicine  3/8/2019 at 5:18 PM

## 2019-03-09 PROBLEM — K62.5 GASTROINTESTINAL HEMORRHAGE ASSOCIATED WITH ANORECTAL SOURCE: Status: ACTIVE | Noted: 2019-03-09

## 2019-03-09 PROBLEM — D50.9 IRON DEFICIENCY ANEMIA: Status: ACTIVE | Noted: 2019-03-09

## 2019-03-09 LAB
ANION GAP SERPL CALCULATED.3IONS-SCNC: 11 MMOL/L (ref 4–16)
APTT: 37.3 SECONDS (ref 21.2–33)
BASOPHILS ABSOLUTE: 0 K/CU MM
BASOPHILS RELATIVE PERCENT: 0.5 % (ref 0–1)
BUN BLDV-MCNC: 20 MG/DL (ref 6–23)
CALCIUM SERPL-MCNC: 8.1 MG/DL (ref 8.3–10.6)
CHLORIDE BLD-SCNC: 103 MMOL/L (ref 99–110)
CO2: 20 MMOL/L (ref 21–32)
CREAT SERPL-MCNC: 1.7 MG/DL (ref 0.6–1.1)
DIFFERENTIAL TYPE: ABNORMAL
EOSINOPHILS ABSOLUTE: 0.1 K/CU MM
EOSINOPHILS RELATIVE PERCENT: 1.5 % (ref 0–3)
GFR AFRICAN AMERICAN: 34 ML/MIN/1.73M2
GFR NON-AFRICAN AMERICAN: 28 ML/MIN/1.73M2
GLUCOSE BLD-MCNC: 80 MG/DL (ref 70–99)
HAPTOGLOBIN: 497
HCT VFR BLD CALC: 25.5 % (ref 37–47)
HCT VFR BLD CALC: 27.3 % (ref 37–47)
HEMOGLOBIN: 7.9 GM/DL (ref 12.5–16)
HEMOGLOBIN: 8.3 GM/DL (ref 12.5–16)
IMMATURE NEUTROPHIL %: 0.6 % (ref 0–0.43)
INR BLD: 1.29 INDEX
LYMPHOCYTES ABSOLUTE: 1.1 K/CU MM
LYMPHOCYTES RELATIVE PERCENT: 13 % (ref 24–44)
MCH RBC QN AUTO: 30.1 PG (ref 27–31)
MCHC RBC AUTO-ENTMCNC: 30.4 % (ref 32–36)
MCV RBC AUTO: 98.9 FL (ref 78–100)
MONOCYTES ABSOLUTE: 0.6 K/CU MM
MONOCYTES RELATIVE PERCENT: 7.1 % (ref 0–4)
NUCLEATED RBC %: 0 %
PDW BLD-RTO: 14.5 % (ref 11.7–14.9)
PLATELET # BLD: 274 K/CU MM (ref 140–440)
PMV BLD AUTO: 8.4 FL (ref 7.5–11.1)
POTASSIUM SERPL-SCNC: 3.4 MMOL/L (ref 3.5–5.1)
PROTHROMBIN TIME: 14.9 SECONDS (ref 9.12–12.5)
RBC # BLD: 2.76 M/CU MM (ref 4.2–5.4)
SEGMENTED NEUTROPHILS ABSOLUTE COUNT: 6.5 K/CU MM
SEGMENTED NEUTROPHILS RELATIVE PERCENT: 77.3 % (ref 36–66)
SODIUM BLD-SCNC: 134 MMOL/L (ref 135–145)
TOTAL IMMATURE NEUTOROPHIL: 0.05 K/CU MM
TOTAL NUCLEATED RBC: 0 K/CU MM
WBC # BLD: 8.5 K/CU MM (ref 4–10.5)

## 2019-03-09 PROCEDURE — 99222 1ST HOSP IP/OBS MODERATE 55: CPT | Performed by: INTERNAL MEDICINE

## 2019-03-09 PROCEDURE — 85730 THROMBOPLASTIN TIME PARTIAL: CPT

## 2019-03-09 PROCEDURE — 97162 PT EVAL MOD COMPLEX 30 MIN: CPT

## 2019-03-09 PROCEDURE — 80048 BASIC METABOLIC PNL TOTAL CA: CPT

## 2019-03-09 PROCEDURE — 2500000003 HC RX 250 WO HCPCS: Performed by: INTERNAL MEDICINE

## 2019-03-09 PROCEDURE — 85025 COMPLETE CBC W/AUTO DIFF WBC: CPT

## 2019-03-09 PROCEDURE — 2060000000 HC ICU INTERMEDIATE R&B

## 2019-03-09 PROCEDURE — 85014 HEMATOCRIT: CPT

## 2019-03-09 PROCEDURE — 94761 N-INVAS EAR/PLS OXIMETRY MLT: CPT

## 2019-03-09 PROCEDURE — 6370000000 HC RX 637 (ALT 250 FOR IP): Performed by: INTERNAL MEDICINE

## 2019-03-09 PROCEDURE — 36415 COLL VENOUS BLD VENIPUNCTURE: CPT

## 2019-03-09 PROCEDURE — 97530 THERAPEUTIC ACTIVITIES: CPT

## 2019-03-09 PROCEDURE — 85610 PROTHROMBIN TIME: CPT

## 2019-03-09 PROCEDURE — 85018 HEMOGLOBIN: CPT

## 2019-03-09 RX ORDER — ACETAMINOPHEN 325 MG/1
325 TABLET ORAL EVERY 4 HOURS PRN
COMMUNITY
End: 2019-04-29 | Stop reason: ALTCHOICE

## 2019-03-09 RX ORDER — BUTALBITAL, ACETAMINOPHEN AND CAFFEINE 50; 325; 40 MG/1; MG/1; MG/1
1 TABLET ORAL EVERY 4 HOURS PRN
Status: DISCONTINUED | OUTPATIENT
Start: 2019-03-09 | End: 2019-03-13 | Stop reason: HOSPADM

## 2019-03-09 RX ORDER — POTASSIUM CHLORIDE 750 MG/1
40 TABLET, FILM COATED, EXTENDED RELEASE ORAL ONCE
Status: COMPLETED | OUTPATIENT
Start: 2019-03-09 | End: 2019-03-09

## 2019-03-09 RX ADMIN — ACETAMINOPHEN 650 MG: 325 TABLET ORAL at 20:33

## 2019-03-09 RX ADMIN — DILTIAZEM HYDROCHLORIDE 30 MG: 30 TABLET, FILM COATED ORAL at 09:05

## 2019-03-09 RX ADMIN — ACETAMINOPHEN 650 MG: 325 TABLET ORAL at 01:33

## 2019-03-09 RX ADMIN — FAMOTIDINE 20 MG: 10 INJECTION, SOLUTION INTRAVENOUS at 10:01

## 2019-03-09 RX ADMIN — ACETAMINOPHEN 650 MG: 325 TABLET ORAL at 09:05

## 2019-03-09 RX ADMIN — ISOSORBIDE MONONITRATE 30 MG: 30 TABLET, EXTENDED RELEASE ORAL at 09:05

## 2019-03-09 RX ADMIN — TIZANIDINE 4 MG: 4 TABLET ORAL at 10:21

## 2019-03-09 RX ADMIN — POTASSIUM CHLORIDE 40 MEQ: 750 TABLET, FILM COATED, EXTENDED RELEASE ORAL at 13:55

## 2019-03-09 RX ADMIN — DILTIAZEM HYDROCHLORIDE 30 MG: 30 TABLET, FILM COATED ORAL at 18:14

## 2019-03-09 RX ADMIN — DILTIAZEM HYDROCHLORIDE 30 MG: 30 TABLET, FILM COATED ORAL at 20:34

## 2019-03-09 RX ADMIN — TIZANIDINE 4 MG: 4 TABLET ORAL at 00:00

## 2019-03-09 RX ADMIN — CARVEDILOL 6.25 MG: 6.25 TABLET, FILM COATED ORAL at 09:05

## 2019-03-09 ASSESSMENT — PAIN DESCRIPTION - FREQUENCY
FREQUENCY: CONTINUOUS

## 2019-03-09 ASSESSMENT — PAIN DESCRIPTION - PROGRESSION
CLINICAL_PROGRESSION: GRADUALLY IMPROVING
CLINICAL_PROGRESSION: GRADUALLY IMPROVING
CLINICAL_PROGRESSION: NOT CHANGED
CLINICAL_PROGRESSION: NOT CHANGED
CLINICAL_PROGRESSION: GRADUALLY IMPROVING
CLINICAL_PROGRESSION: GRADUALLY IMPROVING
CLINICAL_PROGRESSION: NOT CHANGED
CLINICAL_PROGRESSION: GRADUALLY IMPROVING
CLINICAL_PROGRESSION: GRADUALLY IMPROVING
CLINICAL_PROGRESSION: NOT CHANGED
CLINICAL_PROGRESSION: GRADUALLY IMPROVING
CLINICAL_PROGRESSION: NOT CHANGED
CLINICAL_PROGRESSION: GRADUALLY IMPROVING

## 2019-03-09 ASSESSMENT — PAIN DESCRIPTION - DESCRIPTORS
DESCRIPTORS: ACHING
DESCRIPTORS: SORE;SPASM
DESCRIPTORS: ACHING

## 2019-03-09 ASSESSMENT — PAIN DESCRIPTION - LOCATION
LOCATION: HEAD;NECK;BACK
LOCATION: NECK
LOCATION: HEAD;NECK;BACK
LOCATION: NECK
LOCATION: HEAD;NECK;BACK
LOCATION: HEAD;NECK
LOCATION: NECK
LOCATION: NECK

## 2019-03-09 ASSESSMENT — PAIN - FUNCTIONAL ASSESSMENT
PAIN_FUNCTIONAL_ASSESSMENT: PREVENTS OR INTERFERES SOME ACTIVE ACTIVITIES AND ADLS

## 2019-03-09 ASSESSMENT — PAIN SCALES - GENERAL
PAINLEVEL_OUTOF10: 5
PAINLEVEL_OUTOF10: 0
PAINLEVEL_OUTOF10: 8
PAINLEVEL_OUTOF10: 8
PAINLEVEL_OUTOF10: 6
PAINLEVEL_OUTOF10: 10
PAINLEVEL_OUTOF10: 6
PAINLEVEL_OUTOF10: 7
PAINLEVEL_OUTOF10: 9
PAINLEVEL_OUTOF10: 10
PAINLEVEL_OUTOF10: 7
PAINLEVEL_OUTOF10: 5
PAINLEVEL_OUTOF10: 5
PAINLEVEL_OUTOF10: 10
PAINLEVEL_OUTOF10: 7
PAINLEVEL_OUTOF10: 6
PAINLEVEL_OUTOF10: 5
PAINLEVEL_OUTOF10: 10
PAINLEVEL_OUTOF10: 6
PAINLEVEL_OUTOF10: 6

## 2019-03-09 ASSESSMENT — PAIN DESCRIPTION - ORIENTATION
ORIENTATION: POSTERIOR

## 2019-03-09 ASSESSMENT — PAIN DESCRIPTION - ONSET
ONSET: GRADUAL
ONSET: ON-GOING
ONSET: GRADUAL
ONSET: ON-GOING
ONSET: PROGRESSIVE

## 2019-03-09 ASSESSMENT — PAIN DESCRIPTION - DIRECTION
RADIATING_TOWARDS: SHOULDERS

## 2019-03-09 ASSESSMENT — PAIN DESCRIPTION - PAIN TYPE
TYPE: ACUTE PAIN

## 2019-03-09 NOTE — CONSULTS
[address] [place] [address], [address] [place] [address]                                  CONSULTATION    PATIENT NAME: Blaine Wilcox                     :        1930  MED REC NO:   [id number]                          ROOM:       2018  ACCOUNT NO:   [de-identified]                           ADMIT DATE: 2019  PROVIDER:     Elvin Bowser MD    CONSULT DATE:  2019    REFERRING PHYSICIAN:  Dr. Kathy Eaton. PRIMARY GASTROENTEROLOGIST:  Dr. Madelon Spurling. REASON FOR CONSULTATION:  Intermittent hematochezia without melena. HISTORY OF PRESENT ILLNESS:  An 79-year-old  female patient,  who had a past medical history of hypertension, hypercholesterolemia,  diabetes, basal cell carcinoma, asthma and on Xarelto for blood clot,  presented with intermittent hematochezia over the past one week. The  patient reported that she normally had a brownish stool; however, over  the past one week, she had intermittent hematochezia that happened  almost 50% of time when she had a bowel movement. She moved her bowels  five to six times per day, stool was semi-formed, semi-loose. She  denied watery diarrhea. Over the past two days, she no longer had a  bowel movement and she did not have any signs of GI bleeding. She  denied abdominal pain, abdominal cramps. She denied nausea, vomiting,  heartburn, regurgitation, dysphagia, odynophagia, abdominal bloating,  abdominal gas sensations or abdominal girth increase. She did not have  weight loss or weight gain. Her hemoglobin was dropped from 10 to 8.3  over the past two months. Xarelto had been held after the patient  visited our emergency room. The patient reported that she has been on  Xarelto for some blood clot, but the patient did not remember which  blood clot. The patient did have colonoscopy by Dr. Madelon Spurling in . However, the colon was very tortuous and Dr. Madelon Spurling could not pass the  sigmoid colon. At that time, she recommended barium enema test;  however, the patient did not complete the barium enema test at that  time. In addition, the patient just had a recent C. diff infection on  01/09/2019, for which the patient had been treated with antibiotics. ASSESSMENT AND PLAN:  An 25-year-old  female patient, who had a  past medical history of tortuous colon, hypercholesterolemia,  hypertension, diabetes, basal cell carcinoma, asthma, presented with  intermittent hematochezia over the past one week that had spontaneously  resolved over the past two days since the patient did not have any bowel  movement. The patient had been taking Xarelto. The patient denied  abdominal pain or abdominal cramps. The patient denied nausea or  vomiting. The patient denied melena. 1.  Intermittent hematochezia, might be secondary to diverticulosis  bleeding, colon polyps, even colorectal cancer, or internal hemorrhoid  bleeding in the context of Xarelto. Xarelto had been held. The patient  did not have any new episode of GI bleeding since the patient was in the  hospital.  The patient.s hemoglobin had been around 8.3 since the  patient was admitted. The patient had been hemodynamically stable. Of  note, the patient had a very tortuous colon and the patient. s previous  colonoscopy was incomplete. I would recommend continue follow the  patient.s hemoglobin and hematocrit. After her primary  gastroenterologists, Dr. Angel Navarro or Dr. Cherry Terrell, return on Monday, they  will decide whether the patient would need another attempt for  colonoscopy for GI bleeding or not. At this time, I did not think the  patient need an emergent colonoscopy during this weekend. I started the  patient on clear liquid diet today. I would recommend continue  temporarily holding Xarelto at this time. 2.  The patient did not have abdominal pain. I do not think the patient  has acute ischemic colitis.   3.  Mildly elevated creatinine, might be secondary to dehydration. I  would recommend continue follow the patient. s creatinine on a daily  basis. Of note, the patient.s baseline creatinine is around 1.8 to 2.0.  4. Hyponatremia, might be secondary to decreased oral intake. I start  the patient on clear liquid diet. Thank you very much for referring this interesting case.         Dillon Gonzalez MD    D: 03/09/2019 11:21:25       T: 03/09/2019 12:08:36     [id number]  Job#: [id number]     Doc#: [id number]    CC:

## 2019-03-09 NOTE — PROGRESS NOTES
Progress Note (Hospitalist, Internal Medicine)  IDENTIFYING INFORMATION   PATIENT:  Arina Salazar  MRN:  [id number]  ADMIT DATE: 3/8/2019  TIME OF EVALUATION: 3/9/2019 12:27 PM      HISTORY OF PRESENT ILLNESS   Arina Salazar is a 80 y.o. female with a past medical history of AFib on xarelto but stopped 2 days ago since running out of prescriptions, recent hx of C.diff colitis/sepsis discharged 1/2019 who presents to the ED due to acute torticollis of the neck since awaking up this morning. However, on review, she has reported BRBPR since last Saturday with recurrent episodes on tues and some on wed-thurs. On review, she her Hb has dropped by 2 points from her last recent Hb count. SUBJECTIVE     Has severe torticollis of the neck     Had bowel movement this morning.  Does not appear to have blood in it    MEDICATIONS   Medications Prior to Admission  Medications Prior to Admission: isosorbide mononitrate (IMDUR) 30 MG extended release tablet, Take 1 tablet by mouth daily  rivaroxaban (XARELTO) 15 MG TABS tablet, Take 1 tablet by mouth Daily with supper  lactobacillus (CULTURELLE) capsule, Take 1 capsule by mouth daily (with breakfast)  carvedilol (COREG) 6.25 MG tablet, Take 1 tablet by mouth 2 times daily (with meals)  diltiazem (CARDIZEM) 30 MG tablet, Take 1 tablet by mouth 4 times daily    Current Medications  Current Facility-Administered Medications   Medication Dose Route Frequency Provider Last Rate Last Dose    butalbital-acetaminophen-caffeine (FIORICET, ESGIC) per tablet 1 tablet  1 tablet Oral Q4H PRN Mak Thomas MD        morphine sulfate (PF) injection 2 mg  2 mg Intravenous Q30 Min PRN Mak Thomas MD   2 mg at 03/08/19 1802    ondansetron (ZOFRAN) injection 4 mg  4 mg Intravenous Q30 Min PRN Mak Thomas MD   4 mg at 03/08/19 1802    sodium chloride flush 0.9 % injection 10 mL  10 mL Intravenous PRN Mak Thomas MD   10 mL at 03/08/19 1618    diltiazem (CARDIZEM) tablet 30 mg  30 mg Oral 4x Daily Ellen Reza MD   30 mg at 03/09/19 7035    isosorbide mononitrate (IMDUR) extended release tablet 30 mg  30 mg Oral Daily Ellen Reza MD   30 mg at 03/09/19 5837    lactobacillus (CULTURELLE) capsule 1 capsule  1 capsule Oral Daily with breakfast Ellen Reza MD        lactated ringers infusion   Intravenous Continuous Ellen Reza MD 50 mL/hr at 03/08/19 2248      famotidine (PEPCID) injection 20 mg  20 mg Intravenous Daily Ellen Reza MD   20 mg at 03/09/19 1001    tiZANidine (ZANAFLEX) tablet 4 mg  4 mg Oral Q6H PRN Ellen Reza MD   4 mg at 03/09/19 1021    acetaminophen (TYLENOL) tablet 650 mg  650 mg Oral Q4H PRN Ellen Reza MD   650 mg at 03/09/19 0905         Allergies  Allergies   Allergen Reactions    Lisinopril Anaphylaxis       REVIEW OF SYSTEMS     Within above limitations. 14 point review of systems reviewed. Pertinent positive or negative as per HPI or otherwise negative per 14 point systems review. Reviewed 3/9/2019 at 12:27 PM    PHYSICAL EXAM       Blood pressure (!) 105/57, pulse 75, temperature 97.6 °F (36.4 °C), temperature source Oral, resp. rate 25, height 5' 5\" (1.651 m), weight 121 lb (54.9 kg), SpO2 98 %, not currently breastfeeding. General - AAO x 3  Psych - Appropriate affect/speech. No agitation  Eyes - Eye lids intact. No scleral icterus  ENT - Lips wnl. External ear clear/dry/intact. No thyromegaly on inspection  Neuro - No gross peripheral or central neuro deficits on inspection  Heart - Sinus. RRR. S1 and S2 present. No elevated JVD appreciated  Lung - Adequate air entry b/l, No crackles/wheezes appreciated  GI - Soft. No guarding/rigidity. BS+   - No CVA/suprapubic tenderness or palpable bladder distension  Skin - Intact. No rash/petechiae/ecchymosis.  Warm extremities  MSK - stiffness of the neck muscle and lower occipital tenderness on tactile manipulation        LABS AND IMAGING   CBC  [unfilled]    Last 3 Hemoglobin  Lab Results HISTORY:  Has a \"code stroke\" or \"stroke alert\" been called? ->No  Ordering Physician Provided Reason for Exam: headache, neck pain  Acuity: Unknown  Type of Exam: Unknown  Additional signs and symptoms: headache, neck pain  Relevant Medical/Surgical History: same contrast used for CTA NECK ; ORDERING  SYSTEM PROVIDED HISTORY: HA, new, neck pain  TECHNOLOGIST PROVIDED HISTORY:  Has a \"code stroke\" or \"stroke alert\" been called? ->No  Ordering Physician Provided Reason for Exam: headache  Acuity: Unknown  Type of Exam: Unknown  Additional signs and symptoms: pt states headache and neck stiffness  Relevant Medical/Surgical History: 75 ml isovue 370    FINDINGS:    CTA NECK:    AORTIC ARCH/ARCH VESSELS: There is a normal branch pattern of the aortic  arch. No significant stenosis is seen of the innominate artery or subclavian  arteries. CAROTID ARTERIES: The common carotid arteries are normal in appearance  without evidence of a flow limiting stenosis.  There is 60% stenosis of the  proximal left cervical ICA due to calcified plaque.  No dissection or  arterial injury is seen. VERTEBRAL ARTERIES: The vertebral arteries both arise from the subclavian  arteries and are normal in caliber without evidence of flow limiting stenosis  or dissection. SOFT TISSUES: The lung apices are clear.  No cervical or superior mediastinal  lymphadenopathy.  The visualized portion of the larynx and pharynx appear  unremarkable.  The parotid, submandibular and thyroid glands demonstrate no  acute abnormality. BONES: Degenerative changes of the spine are noted. CTA HEAD:    ANTERIOR CIRCULATION: The internal carotid arteries are normal in course and  caliber without focal stenosis. The anterior cerebral and middle cerebral  arteries demonstrate no focal stenosis.  There are bilateral posterior  communicating arteries.     POSTERIOR CIRCULATION: The posterior cerebral arteries demonstrate no focal  stenosis.  There are moderate to severe tandem stenosis of the distal  vertebral arteries bilaterally.  The basilar artery is normal in course and  caliber. BRAIN: See separately dictated noncontrast head CT report.     Impression:       60% stenosis of the proximal left cervical ICA. 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 No large vessel occlusion detected within the head or neck.     CTA HEAD W CONTRAST [[id number]] Collected: 03/08/19 1632     Order Status: Completed Updated: 03/08/19 1641     Narrative:       EXAMINATION:  CTA OF THE HEAD WITH CONTRAST; CTA OF THE NECK 3/8/2019 4:26 pm; 3/8/2019  4:27 pm:    TECHNIQUE:  CTA of the head/brain was performed with the administration of intravenous  contrast. Multiplanar reformatted images are provided for review.  MIP images  are provided for review. Dose modulation, iterative reconstruction, and/or  weight based adjustment of the mA/kV was utilized to reduce the radiation  dose to as low as reasonably achievable.; CTA of the neck was performed with  the administration of intravenous contrast. Multiplanar reformatted images  are provided for review.  MIP images are provided for review. Stenosis of the  internal carotid arteries measured using NASCET criteria. Dose modulation,  iterative reconstruction, and/or weight based adjustment of the mA/kV was  utilized to reduce the radiation dose to as low as reasonably achievable. COMPARISON:  None. HISTORY:  ORDERING SYSTEM PROVIDED HISTORY: HA, new, neck pain  TECHNOLOGIST PROVIDED HISTORY:  Has a \"code stroke\" or \"stroke alert\" been called? ->No  Ordering Physician Provided Reason for Exam: headache, neck pain  Acuity: Unknown  Type of Exam: Unknown  Additional signs and symptoms: headache, neck pain  Relevant Medical/Surgical History: same contrast used for CTA NECK ; ORDERING  SYSTEM PROVIDED HISTORY: HA, new, neck pain  TECHNOLOGIST PROVIDED HISTORY:  Has a \"code stroke\" or \"stroke alert\" been called? ->No  Ordering Physician Provided Reason for Exam: headache  Acuity: Unknown  Type of Exam: Unknown  Additional signs and symptoms: pt states headache and neck stiffness  Relevant Medical/Surgical History: 75 ml isovue 370    FINDINGS:    CTA NECK:    AORTIC ARCH/ARCH VESSELS: There is a normal branch pattern of the aortic  arch. No significant stenosis is seen of the innominate artery or subclavian  arteries. CAROTID ARTERIES: The common carotid arteries are normal in appearance  without evidence of a flow limiting stenosis.  There is 60% stenosis of the  proximal left cervical ICA due to calcified plaque.  No dissection or  arterial injury is seen. VERTEBRAL ARTERIES: The vertebral arteries both arise from the subclavian  arteries and are normal in caliber without evidence of flow limiting stenosis  or dissection. SOFT TISSUES: The lung apices are clear.  No cervical or superior mediastinal  lymphadenopathy.  The visualized portion of the larynx and pharynx appear  unremarkable.  The parotid, submandibular and thyroid glands demonstrate no  acute abnormality. BONES: Degenerative changes of the spine are noted. CTA HEAD:    ANTERIOR CIRCULATION: The internal carotid arteries are normal in course and  caliber without focal stenosis. The anterior cerebral and middle cerebral  arteries demonstrate no focal stenosis.  There are bilateral posterior  communicating arteries. POSTERIOR CIRCULATION: The posterior cerebral arteries demonstrate no focal  stenosis.  There are moderate to severe tandem stenosis of the distal  vertebral arteries bilaterally.  The basilar artery is normal in course and  caliber. BRAIN: See separately dictated noncontrast head CT report.     Impression:       60% stenosis of the proximal left cervical ICA. 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     No large vessel occlusion detected within the head or neck.     CT Head WO Contrast

## 2019-03-09 NOTE — CONSULTS
[facility] PHYSICAL THERAPY EVALUATION  Eli Rutherford, 4/29/1930, 2018/2018-A, 3/9/2019    Discharge Recommendation: Vamshi Huynh PT (continue to assess pending progress)    Equipment: none    History  [place]:  The primary encounter diagnosis was Gastrointestinal hemorrhage, unspecified gastrointestinal hemorrhage type. Diagnoses of Acute nonintractable headache, unspecified headache type, Anemia, unspecified type, and Anticoagulated were also pertinent to this visit. Subjective:  Patient states:  Pt agreeable to PT eval with encouragement. C/o cervical pain with rotation and bilat sidebending  Pain:  7/10. Communication with other providers: RN  Restrictions: fall risk    Home Setup/Prior level of function    Social/Functional History  Social/Functional History  Lives With: Alone  Type of Home:  (condo)  Home Layout: One level  Home Access: Level entry  Bathroom Shower/Tub: Walk-in shower  Bathroom Toilet: Handicap height  Bathroom Equipment: Grab bars in shower, Grab bars around toilet, Shower chair  ADL Assistance: [facility]: Independent  Ambulation Assistance: Independent  Transfer Assistance: Independent  Active : Yes  Type of occupation: retired   Leisure & Hobbies: reading, watching sports        Examination of body systems (includes body structures/functions, activity/participation limitations):  · Observation:  Supine in bed upon arrival, vitals stable  · Vision: [facility]  · Hearing:  [facility]  · Cardiopulmonary: + orthostatic hypotension. See assessment  · Cognition: WFL, see OT/SLP note for further evaluation. Body Structures/Function  · ROM R/L:  WFL. · Strength R/L:  5/5,   · Neuro:  WFL       Mobility:  · Rolling L/R:  SBA  · Supine to sit:  SBA, min A for LE mgt sit to supine. Cues for bridging to complete supine scooting  · Transfers: SBA. Good strength and power. Good immediate standing balance  · Sitting balance:  SBA.     · Standing balance: SBA with RW.    · Gait: unable to progress to gait d/t +orthostatic     AMPAC 6 Clicks Inpatient Mobility:   Current: 18/24  PLOF: 24/24    Treatment:  Therapeutic Activity Training:   Therapeutic activity training was instructed today. Cues were given for safety, sequence, UE/LE placement, awareness, and balance. Activities performed today included bed mobility training, sup-sit, sit-stand, SPT. Safety:  patient left in bed, call light within reach, RN notified, gait belt used. Assessment:  Pt is a 81 yo female who presents to [facility] with BRBPR, low Hb. Pt was recently d/c back to [facility] after lengthy ARU and SNF stay. Evaluated for PT services on this date. She is fatiguing easily, routine functional mobility is effortful. Strength is [facility] for transfers and standing balance. SBP drops from 105 in supine to 71 in standing, returns to 88 after returning to supine. Unable to progress to ambulation at this time--anticipate SBA with RW. Pt will benefit from continued PT follow-up and will likely be safe to return home with [facility] PT when medically stable. Continue to assess pending progress. Activity Tolerance: fair (orthostasis)  Complexity: Moderate  Prognosis: Good, no significant barriers to participation at this time. Plan  days/week: 3+/ 1 week      Goals:  1. Pt will mobilize in bed sup<-> sit at supervision  2. Pt will sit<-> stand with LRAD at supervision  3. Pt will ambulate 200 ft with LRAD at supervision  4.  Pt will ascend/descend 1 stairs with LRAD at supervision      Treatment plan:  Bed mobility, transfers, balance, gait, TA, TX, device training, safety education, stairs  Recommendations for NURSING mobility: CGA to bathroom    Time:   Time in: 1130  Time out: 1157  Timed treatment minutes: 10  Total time: 27    Electronically signed by:    Seng Soler PT  3/9/2019, 12:07 PM

## 2019-03-09 NOTE — ED NOTES
Report called to RASHAWN Serrano on 2E. Pt room 2018 is dirty at this time.       Corrina Rosenthal RN  03/08/19 1268

## 2019-03-09 NOTE — PROGRESS NOTES
Pt resting quietly in bed. Pt has complained of head and neck pain all day. Pt doing well at this time. Has gotten up and used the bedside commode and ate 100% of lunch and around 65% of dinner. Pt in bed with bed in lowest position and call light in reach. Bed alarm on.

## 2019-03-09 NOTE — CONSULTS
test at that time. In addition, the patient just had a recent C. diff infection on 01/09/2019, for which the patient had been treated with antibiotics. PMH:    Past Medical History:   Diagnosis Date    Asthma     Cancer ([facility]     basal cell carcinoma, melanoma    Diabetes ([facility]     [facility]     Hyperlipidemia     Hypertension        PSH:    Past Surgical History:   Procedure Laterality Date    APPENDECTOMY      COLONOSCOPY  6-24-14    severe fixed colon    EYE SURGERY      [place]    HYSTERECTOMY         Medications:    No current facility-administered medications on file prior to encounter. Current Outpatient Medications on File Prior to Encounter   Medication Sig Dispense Refill    isosorbide mononitrate (IMDUR) 30 MG extended release tablet Take 1 tablet by mouth daily 30 tablet 0    rivaroxaban (XARELTO) 15 MG TABS tablet Take 1 tablet by mouth Daily with supper 30 tablet 0    lactobacillus (CULTURELLE) capsule Take 1 capsule by mouth daily (with breakfast) 30 capsule     carvedilol (COREG) 6.25 MG tablet Take 1 tablet by mouth 2 times daily (with meals) 60 tablet 0    diltiazem (CARDIZEM) 30 MG tablet Take 1 tablet by mouth 4 times daily 120 tablet 0       Allergies: Allergies   Allergen Reactions    Lisinopril Anaphylaxis       Social History:    Social History     Socioeconomic History    Marital status:      Spouse name: Not on file    Number of children: 3    Years of education: Not on file    Highest education level: Not on file   Occupational History    Not on file   Social Needs    Financial resource strain: Not on file    Food insecurity:     Worry: Not on file     Inability: Not on file    Transportation needs:     Medical: Not on file     Non-medical: Not on file   Tobacco Use    Smoking status: Never Smoker    Smokeless tobacco: Never Used   Substance and Sexual Activity    Alcohol use: Yes     Comment: socially only    Drug use:  No  Sexual activity: Not on file   Lifestyle    Physical activity:     Days per week: Not on file     Minutes per session: Not on file    Stress: Not on file   Relationships    Social connections:     Talks on phone: Not on file     Gets together: Not on file     Attends [employer] service: Not on file     Active member of club or organization: Not on file     Attends meetings of clubs or organizations: Not on file     Relationship status: Not on file    Intimate partner violence:     Fear of current or ex partner: Not on file     Emotionally abused: Not on file     Physically abused: Not on file     Forced sexual activity: Not on file   Other Topics Concern    Not on file   Social History Narrative    Not on file       Family History:        Problem Relation Age of Onset    Diabetes Mother     Heart Disease Mother     High Blood Pressure Mother     Diabetes Father     High Blood Pressure Father     Heart Disease Father          Review of Systems:  Constitutional: No weight loss, no fevers. Eyes: No problems with vision. ENT: No nose or sinus problems, no oral problems, no throat problems or hoarseness. Cardiovascular: No chest pain, no leg pain with walking, no palpitations, no ankle swelling. Respiratory: No shortness of breath, no persistent cough, no wheezing. Endocrine: No increased thirst, no increased urination. Gastrointestinal: No heartburn, no dysphagia, no abdominal pain, no loss of appetite, no nausea or vomiting, no diarrhea, no constipation, no melena, no hematochezia, no sceleral icterus or jaundice. Skin: No rashes. Musculoskeletal: No trouble walking or standing, no joint pain, no muscle pain. Allergy/Immune System: No allergies, no frequent infections. Neurological: No memory difficulties, no temporary blindness, no difficulty speaking, no headaches, no numbness. Psychiatric: No depression, no suicidal ideation, no auditory hallucinations.   Hematological/Lymphatic: No lymphadenopathy, no frequent nose bleeds, no easy bruising. Genitourinary: No penile/vaginal discharge, no pain with urination, no trouble starting urinary stream, no hematuria. Physical Examination  Vital Signs: BP (!) 105/57   Pulse 75   Temp 97.6 °F (36.4 °C) (Oral)   Resp 25   Ht 5' 5\" (1.651 m)   Wt 121 lb (54.9 kg)   SpO2 98%   BMI 20.14 kg/m²  Body mass index is 20.14 kg/m². General: The patient is a 80 y.o. female in No acute distress. EYE: EOMI, Gross visual field was normal. Pupils reactive, The conjunctive was normal, with no erythema. ENT: no lymphadenopathy, oropharynx is without erythema, edema, or exudates, and moist mucus membranes. The nasal mucosa, septum and turbinates were normal without inflammation or edema. Neck: There was no mass on palpitation, tracheal position was in the middle of the neck and there was no enlarged thyroid. There was no JVD. Lungs: The respiratory was not in labor and the patient did not use accessory muscle. Clear to auscultation bilaterally, no wheeze/crackles. Cardiovascular: Regular rate and rhythm, normal S1 & S2, no murmurs, rubs or gallops appreciated. Peripheral pulses were normal and no tenderness. Abdomen: Soft, non-tender, no rebound or guarding or peritoneal features, no masses, no hepatosplenomegaly. Extremities: upper and lower extremities were warm and dry, no clubbing, cyanosis, edema. Neuro: CN II-XII were intact grossly. Sensation was normal on all extremities and the muscle strength was normal and symmetry. Rectum:  There was no fistular, fissure, external hemorrhoid, tenderness, abscess, erythema or discharge    Labs:  CBC  WBC   Date Value Ref Range Status   03/09/2019 8.5 4.0 - 10.5 K/CU MM Final     Hemoglobin   Date Value Ref Range Status   03/09/2019 8.3 (L) 12.5 - 16.0 GM/DL Final     Hematocrit   Date Value Ref Range Status   03/09/2019 27.3 (L) 37 - 47 % Final     MCV   Date Value Ref Range Status   03/09/2019 98.9 78 - 100 FL Final        Glucose   Date Value Ref Range Status   03/09/2019 80 70 - 99 MG/DL Final     CO2   Date Value Ref Range Status   03/09/2019 20 (L) 21 - 32 MMOL/L Final     BUN   Date Value Ref Range Status   03/09/2019 20 6 - 23 MG/DL Final     Lab Results   Component Value Date    ALT 9 03/08/2019    AST 13 03/08/2019     No results found for: AMYLASE  Lab Results   Component Value Date    LIPASE 34 09/29/2017     Lab Results   Component Value Date     03/07/2019     No components found for: CREACTIVEPR  No results found for: YOSSI No components found for: [place]  Lab Results   Component Value Date    CEA 1.9 05/31/2016     No components found for: Lisabeth Neat, OCCBLD1, OCCBLD2, OCCBLD3, OCCULTBLOOD  Lab Results   Component Value Date    IRON 17 03/07/2019    FERRITIN 1,506 03/07/2019     No results found for: HAV    Assessment     Assessment and Plan: An 80-year-old  female patient, who had a past medical history of tortuous colon, hypercholesterolemia, hypertension, diabetes, basal cell carcinoma, asthma, presented with intermittent hematochezia over the past one week that had spontaneously  resolved over the past two days since the patient did not have any bowel movement. The patient had been taking Xarelto. The patient denied abdominal pain or abdominal cramps. The patient denied nausea or vomiting. The patient denied melena. 1.  Intermittent hematochezia, might be secondary to diverticulosis bleeding, colon polyps, even colorectal cancer, or internal hemorrhoid bleeding in the context of Xarelto. Xarelto had been held. The patient did not have any new episode of GI bleeding since the patient was in the hospital.  The patient.s hemoglobin had been around 8.3 since the patient was admitted. The patient had been hemodynamically stable. Of note, the patient had a very tortuous colon and the patient. s previous colonoscopy was incomplete.   I would recommend continue follow the patient.s hemoglobin and hematocrit. After her primary  gastroenterologists, Dr. Angel Navarro or Dr. Cherry Terrell, return on Monday, they will decide whether the patient would need another attempt for colonoscopy for GI bleeding or not. At this time, I did not think the patient need an emergent colonoscopy during this weekend. I started the  patient on clear liquid diet today. I would recommend continue temporarily holding Xarelto at this time. 2.  The patient did not have abdominal pain. I do not think the patient has acute ischemic colitis. 3.  Mildly elevated creatinine, might be secondary to dehydration. I \would recommend continue follow the patient. s creatinine on a daily basis. Of note, the patient.s baseline creatinine is around 1.8 to 2.0.    4. Hyponatremia, might be secondary to decreased oral intake. I start the patient on clear liquid diet. 5. Semi-loose and Semi-formed stool. She denied worsening diarrhea or water diarrhea. She did have one episode of C diff colitis in the past.  However, his BM had not gotten worse since she received Tx (Vancomycin) for that episode of C diff colitis. Therefore, I did not think that she had recurrent C diff colitis.       Thank you very much for referring this interesting case. Bhavik Houser MD PhD [facility]  [address], [address] [place] [address]  0ffice: [phone]  Fax: [phone]

## 2019-03-09 NOTE — FLOWSHEET NOTE
Pt received from ED via cart. Assisted from cart to bed with steady assist only. Bro well. Applied cardiac monitor and vs obtained. Skin assessed x 2 RN and no open areas noted. Belongings placed in closet per pt request. Oriented to room and call light. Pt voiced understanding to use call light when she needs to get up. Bed alarm on.

## 2019-03-10 LAB
ANION GAP SERPL CALCULATED.3IONS-SCNC: 10 MMOL/L (ref 4–16)
APTT: 36.7 SECONDS (ref 21.2–33)
BASOPHILS ABSOLUTE: 0 K/CU MM
BASOPHILS RELATIVE PERCENT: 0.4 % (ref 0–1)
BUN BLDV-MCNC: 17 MG/DL (ref 6–23)
CALCIUM SERPL-MCNC: 7.8 MG/DL (ref 8.3–10.6)
CHLORIDE BLD-SCNC: 103 MMOL/L (ref 99–110)
CLOSTRIDIUM DIFFICILE, PCR: NORMAL
CLOSTRIDIUM DIFFICILE, PCR: NORMAL
CO2: 19 MMOL/L (ref 21–32)
CREAT SERPL-MCNC: 1.6 MG/DL (ref 0.6–1.1)
DIFFERENTIAL TYPE: ABNORMAL
EOSINOPHILS ABSOLUTE: 0 K/CU MM
EOSINOPHILS RELATIVE PERCENT: 0.4 % (ref 0–3)
GFR AFRICAN AMERICAN: 37 ML/MIN/1.73M2
GFR NON-AFRICAN AMERICAN: 30 ML/MIN/1.73M2
GLUCOSE BLD-MCNC: 123 MG/DL (ref 70–99)
HCT VFR BLD CALC: 24.7 % (ref 37–47)
HCT VFR BLD CALC: 25.4 % (ref 37–47)
HEMOGLOBIN: 7.6 GM/DL (ref 12.5–16)
HEMOGLOBIN: 7.8 GM/DL (ref 12.5–16)
IMMATURE NEUTROPHIL %: 0.4 % (ref 0–0.43)
INR BLD: 1.32 INDEX
LYMPHOCYTES ABSOLUTE: 1.3 K/CU MM
LYMPHOCYTES RELATIVE PERCENT: 16.8 % (ref 24–44)
MAGNESIUM: 1.5 MG/DL (ref 1.8–2.4)
MCH RBC QN AUTO: 30.4 PG (ref 27–31)
MCHC RBC AUTO-ENTMCNC: 30.8 % (ref 32–36)
MCV RBC AUTO: 98.8 FL (ref 78–100)
MONOCYTES ABSOLUTE: 0.5 K/CU MM
MONOCYTES RELATIVE PERCENT: 6.7 % (ref 0–4)
NUCLEATED RBC %: 0 %
PDW BLD-RTO: 14.5 % (ref 11.7–14.9)
PLATELET # BLD: 250 K/CU MM (ref 140–440)
PMV BLD AUTO: 8.4 FL (ref 7.5–11.1)
POTASSIUM SERPL-SCNC: 3.2 MMOL/L (ref 3.5–5.1)
PROTHROMBIN TIME: 15.3 SECONDS (ref 9.12–12.5)
RBC # BLD: 2.5 M/CU MM (ref 4.2–5.4)
SEGMENTED NEUTROPHILS ABSOLUTE COUNT: 5.6 K/CU MM
SEGMENTED NEUTROPHILS RELATIVE PERCENT: 75.3 % (ref 36–66)
SODIUM BLD-SCNC: 132 MMOL/L (ref 135–145)
TOTAL IMMATURE NEUTOROPHIL: 0.03 K/CU MM
TOTAL NUCLEATED RBC: 0 K/CU MM
WBC # BLD: 7.5 K/CU MM (ref 4–10.5)

## 2019-03-10 PROCEDURE — 6360000002 HC RX W HCPCS: Performed by: HOSPITALIST

## 2019-03-10 PROCEDURE — 2060000000 HC ICU INTERMEDIATE R&B

## 2019-03-10 PROCEDURE — 85025 COMPLETE CBC W/AUTO DIFF WBC: CPT

## 2019-03-10 PROCEDURE — 94761 N-INVAS EAR/PLS OXIMETRY MLT: CPT

## 2019-03-10 PROCEDURE — 2500000003 HC RX 250 WO HCPCS: Performed by: INTERNAL MEDICINE

## 2019-03-10 PROCEDURE — 80048 BASIC METABOLIC PNL TOTAL CA: CPT

## 2019-03-10 PROCEDURE — 83735 ASSAY OF MAGNESIUM: CPT

## 2019-03-10 PROCEDURE — 2580000003 HC RX 258: Performed by: INTERNAL MEDICINE

## 2019-03-10 PROCEDURE — 85018 HEMOGLOBIN: CPT

## 2019-03-10 PROCEDURE — 99233 SBSQ HOSP IP/OBS HIGH 50: CPT | Performed by: INTERNAL MEDICINE

## 2019-03-10 PROCEDURE — 85610 PROTHROMBIN TIME: CPT

## 2019-03-10 PROCEDURE — 6370000000 HC RX 637 (ALT 250 FOR IP): Performed by: INTERNAL MEDICINE

## 2019-03-10 PROCEDURE — 85014 HEMATOCRIT: CPT

## 2019-03-10 PROCEDURE — 87324 CLOSTRIDIUM AG IA: CPT

## 2019-03-10 PROCEDURE — 6360000002 HC RX W HCPCS: Performed by: INTERNAL MEDICINE

## 2019-03-10 PROCEDURE — 36415 COLL VENOUS BLD VENIPUNCTURE: CPT

## 2019-03-10 PROCEDURE — 85730 THROMBOPLASTIN TIME PARTIAL: CPT

## 2019-03-10 RX ORDER — MAGNESIUM SULFATE 4 G/50ML
4 INJECTION INTRAVENOUS ONCE
Status: DISCONTINUED | OUTPATIENT
Start: 2019-03-10 | End: 2019-03-10

## 2019-03-10 RX ORDER — POTASSIUM CHLORIDE 750 MG/1
40 TABLET, FILM COATED, EXTENDED RELEASE ORAL 2 TIMES DAILY
Status: DISCONTINUED | OUTPATIENT
Start: 2019-03-10 | End: 2019-03-11

## 2019-03-10 RX ORDER — MAGNESIUM SULFATE IN WATER 40 MG/ML
2 INJECTION, SOLUTION INTRAVENOUS
Status: COMPLETED | OUTPATIENT
Start: 2019-03-10 | End: 2019-03-10

## 2019-03-10 RX ORDER — TIZANIDINE 2 MG/1
2 TABLET ORAL EVERY 4 HOURS PRN
Status: DISCONTINUED | OUTPATIENT
Start: 2019-03-10 | End: 2019-03-13 | Stop reason: HOSPADM

## 2019-03-10 RX ORDER — CARVEDILOL 6.25 MG/1
6.25 TABLET ORAL 2 TIMES DAILY WITH MEALS
Status: DISCONTINUED | OUTPATIENT
Start: 2019-03-10 | End: 2019-03-13 | Stop reason: HOSPADM

## 2019-03-10 RX ORDER — POTASSIUM CHLORIDE 7.45 MG/ML
10 INJECTION INTRAVENOUS
Status: COMPLETED | OUTPATIENT
Start: 2019-03-10 | End: 2019-03-11

## 2019-03-10 RX ORDER — LACTOBACILLUS RHAMNOSUS GG 10B CELL
1 CAPSULE ORAL
Status: DISCONTINUED | OUTPATIENT
Start: 2019-03-10 | End: 2019-03-13 | Stop reason: HOSPADM

## 2019-03-10 RX ADMIN — Medication 1 CAPSULE: at 09:12

## 2019-03-10 RX ADMIN — CARVEDILOL 6.25 MG: 6.25 TABLET, FILM COATED ORAL at 09:13

## 2019-03-10 RX ADMIN — DILTIAZEM HYDROCHLORIDE 30 MG: 30 TABLET, FILM COATED ORAL at 09:13

## 2019-03-10 RX ADMIN — MAGNESIUM SULFATE HEPTAHYDRATE 2 G: 40 INJECTION, SOLUTION INTRAVENOUS at 15:39

## 2019-03-10 RX ADMIN — DILTIAZEM HYDROCHLORIDE 30 MG: 30 TABLET, FILM COATED ORAL at 18:13

## 2019-03-10 RX ADMIN — MAGNESIUM SULFATE HEPTAHYDRATE 2 G: 40 INJECTION, SOLUTION INTRAVENOUS at 12:49

## 2019-03-10 RX ADMIN — POTASSIUM CHLORIDE 40 MEQ: 750 TABLET, FILM COATED, EXTENDED RELEASE ORAL at 09:14

## 2019-03-10 RX ADMIN — Medication 250 MG: at 20:06

## 2019-03-10 RX ADMIN — POTASSIUM CHLORIDE 10 MEQ: 7.46 INJECTION, SOLUTION INTRAVENOUS at 22:11

## 2019-03-10 RX ADMIN — CARVEDILOL 6.25 MG: 6.25 TABLET, FILM COATED ORAL at 18:13

## 2019-03-10 RX ADMIN — POTASSIUM CHLORIDE 40 MEQ: 750 TABLET, FILM COATED, EXTENDED RELEASE ORAL at 20:05

## 2019-03-10 RX ADMIN — POTASSIUM CHLORIDE 10 MEQ: 7.46 INJECTION, SOLUTION INTRAVENOUS at 23:29

## 2019-03-10 RX ADMIN — Medication 250 MG: at 18:14

## 2019-03-10 RX ADMIN — SODIUM CHLORIDE, POTASSIUM CHLORIDE, SODIUM LACTATE AND CALCIUM CHLORIDE: 600; 310; 30; 20 INJECTION, SOLUTION INTRAVENOUS at 22:12

## 2019-03-10 RX ADMIN — TIZANIDINE 4 MG: 4 TABLET ORAL at 01:18

## 2019-03-10 RX ADMIN — TIZANIDINE 2 MG: 2 TABLET ORAL at 13:14

## 2019-03-10 RX ADMIN — DILTIAZEM HYDROCHLORIDE 30 MG: 30 TABLET, FILM COATED ORAL at 12:52

## 2019-03-10 RX ADMIN — MAGNESIUM SULFATE HEPTAHYDRATE 2 G: 40 INJECTION, SOLUTION INTRAVENOUS at 18:15

## 2019-03-10 RX ADMIN — Medication 1 CAPSULE: at 18:13

## 2019-03-10 RX ADMIN — ISOSORBIDE MONONITRATE 30 MG: 30 TABLET, EXTENDED RELEASE ORAL at 09:13

## 2019-03-10 RX ADMIN — Medication 250 MG: at 13:11

## 2019-03-10 RX ADMIN — FAMOTIDINE 20 MG: 10 INJECTION, SOLUTION INTRAVENOUS at 09:13

## 2019-03-10 ASSESSMENT — PAIN DESCRIPTION - PROGRESSION
CLINICAL_PROGRESSION: NOT CHANGED
CLINICAL_PROGRESSION: GRADUALLY IMPROVING
CLINICAL_PROGRESSION: NOT CHANGED
CLINICAL_PROGRESSION: GRADUALLY IMPROVING
CLINICAL_PROGRESSION: NOT CHANGED
CLINICAL_PROGRESSION: GRADUALLY IMPROVING
CLINICAL_PROGRESSION: NOT CHANGED
CLINICAL_PROGRESSION: GRADUALLY IMPROVING

## 2019-03-10 ASSESSMENT — PAIN SCALES - GENERAL
PAINLEVEL_OUTOF10: 4
PAINLEVEL_OUTOF10: 4
PAINLEVEL_OUTOF10: 7
PAINLEVEL_OUTOF10: 4
PAINLEVEL_OUTOF10: 7
PAINLEVEL_OUTOF10: 4
PAINLEVEL_OUTOF10: 6
PAINLEVEL_OUTOF10: 4
PAINLEVEL_OUTOF10: 7
PAINLEVEL_OUTOF10: 4
PAINLEVEL_OUTOF10: 7
PAINLEVEL_OUTOF10: 7

## 2019-03-10 ASSESSMENT — PAIN DESCRIPTION - DESCRIPTORS
DESCRIPTORS: ACHING
DESCRIPTORS: ACHING

## 2019-03-10 ASSESSMENT — PAIN - FUNCTIONAL ASSESSMENT: PAIN_FUNCTIONAL_ASSESSMENT: PREVENTS OR INTERFERES SOME ACTIVE ACTIVITIES AND ADLS

## 2019-03-10 ASSESSMENT — PAIN DESCRIPTION - LOCATION
LOCATION: HEAD;NECK
LOCATION: HEAD;NECK

## 2019-03-10 ASSESSMENT — PAIN DESCRIPTION - PAIN TYPE
TYPE: ACUTE PAIN
TYPE: ACUTE PAIN

## 2019-03-10 ASSESSMENT — PAIN DESCRIPTION - ONSET: ONSET: PROGRESSIVE

## 2019-03-10 ASSESSMENT — PAIN DESCRIPTION - ORIENTATION: ORIENTATION: POSTERIOR

## 2019-03-10 ASSESSMENT — PAIN DESCRIPTION - FREQUENCY
FREQUENCY: CONTINUOUS
FREQUENCY: CONTINUOUS

## 2019-03-10 ASSESSMENT — PAIN DESCRIPTION - DIRECTION: RADIATING_TOWARDS: SHOULDERS

## 2019-03-10 NOTE — PROGRESS NOTES
Reason for Visit: anemia and multiple BMs and GI bleeding    HPI: The patient denied abd pain. However, she had multiple BMs last night. The patient reported no BI bleeding, no BRBPR, no melena. She stated that the stool was formed. However, based on the nursing note that the stool was liquid and stool was sent for C diff       PMH:    Past Medical History:   Diagnosis Date    Asthma     Cancer [facility] [facility]     basal cell carcinoma, melanoma    Diabetes ([facility] [facility]     Diabetes mellitus ([facility] [address] [facility]     Hyperlipidemia     Hypertension        PSH:    Past Surgical History:   Procedure Laterality Date    APPENDECTOMY      COLONOSCOPY  6-24-14    severe fixed colon    EYE SURGERY      Cataracs    HYSTERECTOMY         Medications:    No current facility-administered medications on file prior to encounter. Current Outpatient Medications on File Prior to Encounter   Medication Sig Dispense Refill    Cholecalciferol (VITAMIN D3) 5000 units TABS Take by mouth daily      acetaminophen (TYLENOL) 325 MG tablet Take 325 mg by mouth every 4 hours as needed for Pain (As needed)      isosorbide mononitrate (IMDUR) 30 MG extended release tablet Take 1 tablet by mouth daily 30 tablet 0    rivaroxaban (XARELTO) 15 MG TABS tablet Take 1 tablet by mouth Daily with supper 30 tablet 0    carvedilol (COREG) 6.25 MG tablet Take 1 tablet by mouth 2 times daily (with meals) 60 tablet 0    diltiazem (CARDIZEM) 30 MG tablet Take 1 tablet by mouth 4 times daily 120 tablet 0    lactobacillus (CULTURELLE) capsule Take 1 capsule by mouth daily (with breakfast) 30 capsule        Allergies: Allergies   Allergen Reactions    Lisinopril Anaphylaxis       Social History:    Social History     Socioeconomic History    Marital status:       Spouse name: Not on file    Number of children: 3    Years of education: Not on file    Highest education level: Not on file   Occupational History    Not on file   Social Needs    Financial resource strain: Not on file    Food insecurity:     Worry: Not on file     Inability: Not on file    Transportation needs:     Medical: Not on file     Non-medical: Not on file   Tobacco Use    Smoking status: Never Smoker    Smokeless tobacco: Never Used   Substance and Sexual Activity    Alcohol use: Yes     Comment: socially only    Drug use: No    Sexual activity: Not on file   Lifestyle    Physical activity:     Days per week: Not on file     Minutes per session: Not on file    Stress: Not on file   Relationships    Social connections:     Talks on phone: Not on file     Gets together: Not on file     Attends [employer] service: Not on file     Active member of club or organization: Not on file     Attends meetings of clubs or organizations: Not on file     Relationship status: Not on file    Intimate partner violence:     Fear of current or ex partner: Not on file     Emotionally abused: Not on file     Physically abused: Not on file     Forced sexual activity: Not on file   Other Topics Concern    Not on file   Social History Narrative    Not on file       Family History:        Problem Relation Age of Onset    Diabetes Mother     Heart Disease Mother     High Blood Pressure Mother     Diabetes Father     High Blood Pressure Father     Heart Disease Father          Review of Systems:  Constitutional: No weight loss, no fevers. Eyes: No problems with vision. ENT: No nose or sinus problems, no oral problems, no throat problems or hoarseness. Cardiovascular: No chest pain, no leg pain with walking, no palpitations, no ankle swelling. Respiratory: No shortness of breath, no persistent cough, no wheezing. Endocrine: No increased thirst, no increased urination. Gastrointestinal: No heartburn, no dysphagia, no abdominal pain, no loss of appetite, no nausea or vomiting, no diarrhea, no constipation, no melena, no hematochezia, no sceleral icterus or jaundice.   Skin: No hemorrhoid, tenderness, abscess, erythema or discharge    Labs:  CBC  WBC   Date Value Ref Range Status   03/10/2019 7.5 4.0 - 10.5 K/CU MM Final     Hemoglobin   Date Value Ref Range Status   03/10/2019 7.6 (L) 12.5 - 16.0 GM/DL Final     Hematocrit   Date Value Ref Range Status   03/10/2019 24.7 (L) 37 - 47 % Final     MCV   Date Value Ref Range Status   03/10/2019 98.8 78 - 100 FL Final        Glucose   Date Value Ref Range Status   03/10/2019 123 (H) 70 - 99 MG/DL Final     CO2   Date Value Ref Range Status   03/10/2019 19 (L) 21 - 32 MMOL/L Final     BUN   Date Value Ref Range Status   03/10/2019 17 6 - 23 MG/DL Final     Lab Results   Component Value Date    ALT 9 03/08/2019    AST 13 03/08/2019     No results found for: AMYLASE  Lab Results   Component Value Date    LIPASE 34 09/29/2017     Lab Results   Component Value Date     03/07/2019     No components found for: CREACTIVEPR  No results found for: YOSSI No components found for: Jeff Wei  Lab Results   Component Value Date    CEA 1.9 05/31/2016     No components found for: [place], OCCULTBLOODS, OCCBLD1, OCCBLD2, OCCBLD3, OCCULTBLOOD  Lab Results   Component Value Date    IRON 17 03/07/2019    FERRITIN 1,506 03/07/2019     No results found for: HAV    Assessment     Assessment and Plan:    1. GI bleeding. She no longer had BRBPR and her H/H were stable. At this time, she does not need an emergent colonoscopy. Conception Damion, her primary GI doctor, Dr. Nirmal Campos and Dr. Pau Owens would return and they will decide when the patient needs a colonoscopy. 2. Multiple BM last night might be due to diet-related loose stool or recurrent C diff infection or post-infectious IBS. The stool was sent for rechecking C diff. The result was pending. Sometimes, a positive stool C diff test might just suggest C diff carrier instead of active C diff colitis.   However, if the patient still has water stool or loose stool and the stool was positive with C diff, I would suggest retreat her with Vancomycin and also consult ID doctor at that time. Arpit Mcclellan MD PhD [facility]  [address] [facility] [address]  0ffice: [phone]  Fax: [phone]

## 2019-03-10 NOTE — PROGRESS NOTES
in dextrose 5 % 100 mL IVPB  6 g Intravenous Once Kendell Clemens MD        magnesium sulfate 2 g in 50 mL IVPB premix  2 g Intravenous Q2H Kendell Clemens MD        butalbital-acetaminophen-caffeine (FIORICET, [facility]) per tablet 1 tablet  1 tablet Oral Q4H PRN Kendell Clemens MD        morphine sulfate (PF) injection 2 mg  2 mg Intravenous Q30 Min PRN Kendell Clemens MD   2 mg at 03/08/19 1802    ondansetron (ZOFRAN) injection 4 mg  4 mg Intravenous Q30 Min PRN Kendell Clemens MD   4 mg at 03/08/19 1802    sodium chloride flush 0.9 % injection 10 mL  10 mL Intravenous PRN Kendell Clemens MD   10 mL at 03/08/19 1618    diltiazem (CARDIZEM) tablet 30 mg  30 mg Oral 4x Daily Kendell Clemens MD   30 mg at 03/10/19 0913    isosorbide mononitrate (IMDUR) extended release tablet 30 mg  30 mg Oral Daily Kendell Clemens MD   30 mg at 03/10/19 0913    lactobacillus (CULTURELLE) capsule 1 capsule  1 capsule Oral Daily with breakfast Kendell Clemens MD   1 capsule at 03/10/19 0912    lactated ringers infusion   Intravenous Continuous Kendell Clemens MD 50 mL/hr at 03/09/19 2015      famotidine (PEPCID) injection 20 mg  20 mg Intravenous Daily Kendell Clemens MD   20 mg at 03/10/19 0913    acetaminophen (TYLENOL) tablet 650 mg  650 mg Oral Q4H PRN Kendell Clemens MD   650 mg at 03/09/19 2033         Allergies  Allergies   Allergen Reactions    Lisinopril Anaphylaxis       REVIEW OF SYSTEMS     Within above limitations. 14 point review of systems reviewed. Pertinent positive or negative as per HPI or otherwise negative per 14 point systems review. Reviewed 3/10/2019 at 11:20 AM    PHYSICAL EXAM       Blood pressure (!) 122/55, pulse 90, temperature 98.9 °F (37.2 °C), temperature source Oral, resp. rate 21, height 5' 5\" (1.651 m), weight 126 lb 1.7 oz (57.2 kg), SpO2 96 %, not currently breastfeeding. General - AAO x 3  Psych - Appropriate affect/speech. No agitation  Eyes - Eye lids intact. No scleral icterus  ENT - Lips wnl.  External dose to as low as reasonably achievable. COMPARISON:  None. HISTORY:  ORDERING SYSTEM PROVIDED HISTORY: HA, new, neck pain  TECHNOLOGIST PROVIDED HISTORY:  Has a \"code stroke\" or \"stroke alert\" been called? ->No  Ordering Physician Provided Reason for Exam: headache, neck pain  Acuity: Unknown  Type of Exam: Unknown  Additional signs and symptoms: headache, neck pain  Relevant Medical/Surgical History: same contrast used for CTA NECK ; ORDERING  SYSTEM PROVIDED HISTORY: HA, new, neck pain  TECHNOLOGIST PROVIDED HISTORY:  Has a \"code stroke\" or \"stroke alert\" been called? ->No  Ordering Physician Provided Reason for Exam: headache  Acuity: Unknown  Type of Exam: Unknown  Additional signs and symptoms: pt states headache and neck stiffness  Relevant Medical/Surgical History: 75 ml isovue 370    FINDINGS:    CTA NECK:    AORTIC ARCH/ARCH VESSELS: There is a normal branch pattern of the aortic  arch. No significant stenosis is seen of the innominate artery or subclavian  arteries. CAROTID ARTERIES: The common carotid arteries are normal in appearance  without evidence of a flow limiting stenosis.  There is 60% stenosis of the  proximal left cervical ICA due to calcified plaque.  No dissection or  arterial injury is seen. VERTEBRAL ARTERIES: The vertebral arteries both arise from the subclavian  arteries and are normal in caliber without evidence of flow limiting stenosis  or dissection. SOFT TISSUES: The lung apices are clear.  No cervical or superior mediastinal  lymphadenopathy.  The visualized portion of the larynx and pharynx appear  unremarkable.  The parotid, submandibular and thyroid glands demonstrate no  acute abnormality. BONES: Degenerative changes of the spine are noted. CTA HEAD:    ANTERIOR CIRCULATION: The internal carotid arteries are normal in course and  caliber without focal stenosis.  The anterior cerebral and middle cerebral  arteries demonstrate no focal stenosis.  There are bilateral posterior  communicating arteries. POSTERIOR CIRCULATION: The posterior cerebral arteries demonstrate no focal  stenosis.  There are moderate to severe tandem stenosis of the distal  vertebral arteries bilaterally.  The basilar artery is normal in course and  caliber. BRAIN: See separately dictated noncontrast head CT report.     Impression:       60% stenosis of the proximal left cervical ICA. 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 No large vessel occlusion detected within the head or neck.     CT Head WO Contrast [[id number]] Collected: 03/08/19 1539     Order Status: Completed Updated: 03/08/19 1543     Narrative:       EXAMINATION:  CT OF THE HEAD WITHOUT CONTRAST  3/8/2019 3:35 pm    TECHNIQUE:  CT of the head was performed without the administration of intravenous  contrast. Dose modulation, iterative reconstruction, and/or weight based  adjustment of the mA/kV was utilized to reduce the radiation dose to as low  as reasonably achievable. COMPARISON:  None. HISTORY:  ORDERING SYSTEM PROVIDED HISTORY: HA, new  TECHNOLOGIST PROVIDED HISTORY:  Has a \"code stroke\" or \"stroke alert\" been called? ->No  Ordering Physician Provided Reason for Exam: HEADACHE  Acuity: Unknown  Type of Exam: Unknown  Additional signs and symptoms: PT STATES HEADACHE AD NECK STIFFNESS  Relevant Medical/Surgical History: NONE    FINDINGS:  BRAIN/VENTRICLES: The ventricles and sulci are diffusely enlarged.  Low  attenuation is seen in the periventricular and subcortical white matter.  No  acute intracranial hemorrhage or acute infarct is identified. ORBITS: The visualized portion of the orbits demonstrate no acute abnormality. SINUSES: The visualized paranasal sinuses and mastoid air cells demonstrate  no acute abnormality. SOFT TISSUES/SKULL:  No acute abnormality of the visualized skull or soft  tissues.     Impression:       No acute intracranial abnormality.     Diffuse atrophic changes with findings suggesting chronic microvascular  ischemia             Relevant labs and imaging reviewed    ASSESSMENT AND PLAN     BRBPR, acute blood loss anemia  - Hb daily  - clears  - IVF  - type and screen, monitor for clinical bleeding   - d/w GI on call, await Dr Petersen/Chencho marcelo on Monday prior to discharge. Has hx of difficult C-scope 2014 - \"unable to pass beyond sigmoid\"   - replete IV Mg today     Recent hx of C.diff colitis  No with diarrhea  - repeat C.diff pend    Torticollis  - PT/OT  - prn zanaflex  - massage therapy  - heating pad  - BP low since talking xanaflex - monitor     AFib on xarelto - now ran out 2 days ago.  She reports visiting with Dr Gianfranco Arce  - hold [employer] due to bleeding    Chronic diastolic CHF  CKD     SCD        [facility], Internal Medicine  3/10/2019 at 11:20 AM

## 2019-03-10 NOTE — PLAN OF CARE
Problem: Falls - Risk of:  Goal: Will remain free from falls  Description  Will remain free from falls  Outcome: Ongoing     Problem: Safety:  Goal: Free from accidental physical injury  Description  Free from accidental physical injury  Outcome: Ongoing     Problem: Infection:  Goal: Will remain free from infection  Description  Will remain free from infection  Outcome: Ongoing     Problem: Safety:  Goal: Free from accidental physical injury  Description  Free from accidental physical injury  Outcome: Ongoing     Problem: Daily Care:  Goal: Daily care needs are met  Description  Daily care needs are met  Outcome: Ongoing     Problem: Pain:  Goal: Patient's pain/discomfort is manageable  Description  Patient's pain/discomfort is manageable  Outcome: Ongoing     Problem: Skin Integrity:  Goal: Skin integrity will stabilize  Description  Skin integrity will stabilize  Outcome: Ongoing

## 2019-03-11 LAB
ANION GAP SERPL CALCULATED.3IONS-SCNC: 10 MMOL/L (ref 4–16)
BASOPHILS ABSOLUTE: 0 K/CU MM
BASOPHILS RELATIVE PERCENT: 0.4 % (ref 0–1)
BUN BLDV-MCNC: 16 MG/DL (ref 6–23)
CALCIUM SERPL-MCNC: 8.2 MG/DL (ref 8.3–10.6)
CHLORIDE BLD-SCNC: 103 MMOL/L (ref 99–110)
CO2: 19 MMOL/L (ref 21–32)
CREAT SERPL-MCNC: 1.5 MG/DL (ref 0.6–1.1)
DIFFERENTIAL TYPE: ABNORMAL
EOSINOPHILS ABSOLUTE: 0.2 K/CU MM
EOSINOPHILS RELATIVE PERCENT: 1.9 % (ref 0–3)
GFR AFRICAN AMERICAN: 40 ML/MIN/1.73M2
GFR NON-AFRICAN AMERICAN: 33 ML/MIN/1.73M2
GLUCOSE BLD-MCNC: 100 MG/DL (ref 70–99)
HCT VFR BLD CALC: 21.6 % (ref 37–47)
HCT VFR BLD CALC: 24.2 % (ref 37–47)
HEMOGLOBIN: 7.3 GM/DL (ref 12.5–16)
HEMOGLOBIN: ABNORMAL GM/DL (ref 12.5–16)
IGA: 296 MG/DL (ref 69–382)
IGG,SERUM: 746 MG/DL (ref 723–1685)
IGM,SERUM: 67 MG/DL (ref 62–277)
IMMATURE NEUTROPHIL %: 0.5 % (ref 0–0.43)
LACTATE: 0.5 MMOL/L (ref 0.4–2)
LYMPHOCYTES ABSOLUTE: 1.7 K/CU MM
LYMPHOCYTES RELATIVE PERCENT: 21.2 % (ref 24–44)
MAGNESIUM: 3.5 MG/DL (ref 1.8–2.4)
MCH RBC QN AUTO: 29.8 PG (ref 27–31)
MCHC RBC AUTO-ENTMCNC: 30.2 % (ref 32–36)
MCV RBC AUTO: 98.8 FL (ref 78–100)
MONOCYTES ABSOLUTE: 0.4 K/CU MM
MONOCYTES RELATIVE PERCENT: 5.2 % (ref 0–4)
NUCLEATED RBC %: 0 %
PDW BLD-RTO: 14.5 % (ref 11.7–14.9)
PLATELET # BLD: 278 K/CU MM (ref 140–440)
PMV BLD AUTO: 8.7 FL (ref 7.5–11.1)
POTASSIUM SERPL-SCNC: 4.6 MMOL/L (ref 3.5–5.1)
RBC # BLD: 2.45 M/CU MM (ref 4.2–5.4)
REASON FOR REJECTION: NORMAL
REASON FOR REJECTION: NORMAL
REJECTED TEST: NORMAL
SEGMENTED NEUTROPHILS ABSOLUTE COUNT: 5.6 K/CU MM
SEGMENTED NEUTROPHILS RELATIVE PERCENT: 70.8 % (ref 36–66)
SODIUM BLD-SCNC: 132 MMOL/L (ref 135–145)
TOTAL IMMATURE NEUTOROPHIL: 0.04 K/CU MM
TOTAL NUCLEATED RBC: 0 K/CU MM
WBC # BLD: 8 K/CU MM (ref 4–10.5)

## 2019-03-11 PROCEDURE — 6360000002 HC RX W HCPCS: Performed by: HOSPITALIST

## 2019-03-11 PROCEDURE — 94761 N-INVAS EAR/PLS OXIMETRY MLT: CPT

## 2019-03-11 PROCEDURE — 97110 THERAPEUTIC EXERCISES: CPT

## 2019-03-11 PROCEDURE — 85018 HEMOGLOBIN: CPT

## 2019-03-11 PROCEDURE — 99222 1ST HOSP IP/OBS MODERATE 55: CPT | Performed by: INTERNAL MEDICINE

## 2019-03-11 PROCEDURE — 6370000000 HC RX 637 (ALT 250 FOR IP): Performed by: INTERNAL MEDICINE

## 2019-03-11 PROCEDURE — 2580000003 HC RX 258: Performed by: INTERNAL MEDICINE

## 2019-03-11 PROCEDURE — 83735 ASSAY OF MAGNESIUM: CPT

## 2019-03-11 PROCEDURE — 85014 HEMATOCRIT: CPT

## 2019-03-11 PROCEDURE — 2580000003 HC RX 258: Performed by: HOSPITALIST

## 2019-03-11 PROCEDURE — P9016 RBC LEUKOCYTES REDUCED: HCPCS

## 2019-03-11 PROCEDURE — 36430 TRANSFUSION BLD/BLD COMPNT: CPT

## 2019-03-11 PROCEDURE — 2500000003 HC RX 250 WO HCPCS: Performed by: INTERNAL MEDICINE

## 2019-03-11 PROCEDURE — 80048 BASIC METABOLIC PNL TOTAL CA: CPT

## 2019-03-11 PROCEDURE — 97530 THERAPEUTIC ACTIVITIES: CPT

## 2019-03-11 PROCEDURE — 83605 ASSAY OF LACTIC ACID: CPT

## 2019-03-11 PROCEDURE — 2700000000 HC OXYGEN THERAPY PER DAY

## 2019-03-11 PROCEDURE — 85025 COMPLETE CBC W/AUTO DIFF WBC: CPT

## 2019-03-11 PROCEDURE — 82784 ASSAY IGA/IGD/IGG/IGM EACH: CPT

## 2019-03-11 PROCEDURE — 1200000000 HC SEMI PRIVATE

## 2019-03-11 PROCEDURE — 36415 COLL VENOUS BLD VENIPUNCTURE: CPT

## 2019-03-11 PROCEDURE — 6370000000 HC RX 637 (ALT 250 FOR IP): Performed by: NURSE PRACTITIONER

## 2019-03-11 RX ORDER — SODIUM CHLORIDE 9 MG/ML
INJECTION, SOLUTION INTRAVENOUS CONTINUOUS
Status: DISCONTINUED | OUTPATIENT
Start: 2019-03-11 | End: 2019-03-13

## 2019-03-11 RX ORDER — 0.9 % SODIUM CHLORIDE 0.9 %
250 INTRAVENOUS SOLUTION INTRAVENOUS ONCE
Status: COMPLETED | OUTPATIENT
Start: 2019-03-11 | End: 2019-03-12

## 2019-03-11 RX ORDER — ASPIRIN 81 MG/1
81 TABLET ORAL DAILY
Status: DISCONTINUED | OUTPATIENT
Start: 2019-03-11 | End: 2019-03-13 | Stop reason: HOSPADM

## 2019-03-11 RX ADMIN — POTASSIUM CHLORIDE 10 MEQ: 7.46 INJECTION, SOLUTION INTRAVENOUS at 00:12

## 2019-03-11 RX ADMIN — TIZANIDINE 2 MG: 2 TABLET ORAL at 14:50

## 2019-03-11 RX ADMIN — DILTIAZEM HYDROCHLORIDE 30 MG: 30 TABLET, FILM COATED ORAL at 23:33

## 2019-03-11 RX ADMIN — SODIUM CHLORIDE 250 ML: 9 INJECTION, SOLUTION INTRAVENOUS at 23:33

## 2019-03-11 RX ADMIN — CARVEDILOL 6.25 MG: 6.25 TABLET, FILM COATED ORAL at 10:07

## 2019-03-11 RX ADMIN — Medication 1 CAPSULE: at 14:42

## 2019-03-11 RX ADMIN — POTASSIUM CHLORIDE 10 MEQ: 7.46 INJECTION, SOLUTION INTRAVENOUS at 01:19

## 2019-03-11 RX ADMIN — FIDAXOMICIN 200 MG: 200 TABLET, FILM COATED ORAL at 23:33

## 2019-03-11 RX ADMIN — ASPIRIN 81 MG: 81 TABLET, COATED ORAL at 10:07

## 2019-03-11 RX ADMIN — Medication 500 MG: at 10:08

## 2019-03-11 RX ADMIN — FIDAXOMICIN 200 MG: 200 TABLET, FILM COATED ORAL at 14:42

## 2019-03-11 RX ADMIN — ISOSORBIDE MONONITRATE 30 MG: 30 TABLET, EXTENDED RELEASE ORAL at 10:08

## 2019-03-11 RX ADMIN — POTASSIUM CHLORIDE 40 MEQ: 750 TABLET, FILM COATED, EXTENDED RELEASE ORAL at 10:07

## 2019-03-11 RX ADMIN — TIZANIDINE 2 MG: 2 TABLET ORAL at 10:17

## 2019-03-11 RX ADMIN — FAMOTIDINE 20 MG: 10 INJECTION, SOLUTION INTRAVENOUS at 10:08

## 2019-03-11 RX ADMIN — Medication 1 CAPSULE: at 10:08

## 2019-03-11 RX ADMIN — SODIUM CHLORIDE: 9 INJECTION, SOLUTION INTRAVENOUS at 14:50

## 2019-03-11 RX ADMIN — BUTALBITAL, ACETAMINOPHEN AND CAFFEINE 1 TABLET: 50; 325; 40 TABLET ORAL at 23:33

## 2019-03-11 RX ADMIN — DILTIAZEM HYDROCHLORIDE 30 MG: 30 TABLET, FILM COATED ORAL at 10:08

## 2019-03-11 RX ADMIN — Medication 1 CAPSULE: at 17:17

## 2019-03-11 ASSESSMENT — PAIN SCALES - GENERAL
PAINLEVEL_OUTOF10: 6
PAINLEVEL_OUTOF10: 6
PAINLEVEL_OUTOF10: 8
PAINLEVEL_OUTOF10: 0
PAINLEVEL_OUTOF10: 6
PAINLEVEL_OUTOF10: 4
PAINLEVEL_OUTOF10: 6
PAINLEVEL_OUTOF10: 4
PAINLEVEL_OUTOF10: 6
PAINLEVEL_OUTOF10: 6
PAINLEVEL_OUTOF10: 0

## 2019-03-11 ASSESSMENT — PAIN DESCRIPTION - PROGRESSION

## 2019-03-11 ASSESSMENT — PAIN DESCRIPTION - FREQUENCY: FREQUENCY: CONTINUOUS

## 2019-03-11 ASSESSMENT — PAIN DESCRIPTION - PAIN TYPE
TYPE: ACUTE PAIN
TYPE: ACUTE PAIN

## 2019-03-11 ASSESSMENT — PAIN DESCRIPTION - DIRECTION: RADIATING_TOWARDS: SHOULDERS

## 2019-03-11 ASSESSMENT — PAIN DESCRIPTION - ORIENTATION: ORIENTATION: POSTERIOR

## 2019-03-11 ASSESSMENT — PAIN DESCRIPTION - LOCATION
LOCATION: HEAD
LOCATION: HEAD;NECK

## 2019-03-11 ASSESSMENT — PAIN - FUNCTIONAL ASSESSMENT: PAIN_FUNCTIONAL_ASSESSMENT: PREVENTS OR INTERFERES SOME ACTIVE ACTIVITIES AND ADLS

## 2019-03-11 ASSESSMENT — PAIN DESCRIPTION - DESCRIPTORS: DESCRIPTORS: ACHING

## 2019-03-11 ASSESSMENT — PAIN DESCRIPTION - ONSET: ONSET: PROGRESSIVE

## 2019-03-11 NOTE — CONSULTS
Infectious Disease Consult Note  3/11/2019   Patient Name: Jelena Schroeder : 1930   Impression   C diff colitis  · Preceded by antibiotic use in 2018, has failed to resolved since first diagnosed in 2019 despite vancomycin use (14 days)   CKD stage IV  · Type 2 DM   Multi-morbidity: per PMHx  Plan:   D/c vancomycin   Start Fidaxomicin for 10 days, may need a fidaxomicin taper  · Zohra Hansen will be of benefit   Check IgG, IgM, IgA  Thank you for allowing me to consult in the care of this patient.  ------------------------  REASON FOR CONSULT: Infective syndrome   Requested by: Dr. Jia Thurston is a 80 y.o. white female with CKD 4 who was admitted 3/8/2019 for further evaluation and management of bright red blood per rectum for some days prior to admission. The patient has had diarrhea since 2019. States that she was sick in 2018 and was given antibiotics (can't recall name) for a cough. She was seen in the hospital and diagnosed with C diff (PCR and CT findings). Plan was to have vancomycin for 14 days. Discharged to ARU and then to [facility] home and returned home. Diarrhea has never resolved. Denies fever, chills, abdominal cramps. Endorses weight loss. Treated with vancomycin. ? Infectious diseases service was consulted to evaluate the pt, and recommend further investigative and therapeutic measures. ROS: Other systems reviewed Including eyes, ENT, respiratory, cardiovascular, GI, , dermatologic, neurologic, psych, hem/lymphatic, musculoskeletal and endocrine were negative other than what is mentioned above.      Patient Active Problem List    Diagnosis Date Noted    Iron deficiency anemia 2019    Gastrointestinal hemorrhage associated with anorectal source 2019    Anemia     GIB (gastrointestinal bleeding) 2019    Sepsis ([address] 2019    Debility 2019    Uncontrolled pain 2019    CKD (chronic kidney disease) stage 4, GFR 15-29 ml/min ([facility]) 01/06/2019    Gait disturbance 01/06/2019    Cellulitis of right arm 01/01/2019    Type 2 diabetes mellitus ([facility] 01/01/2019    Stage 3 chronic kidney disease ([facility] 01/01/2019    Anemia due to vitamin B12 deficiency 01/01/2019    Essential hypertension 01/01/2019    Angioedema 08/20/2016     Past Medical History:   Diagnosis Date    Asthma     Cancer ([facility]     basal cell carcinoma, melanoma    Diabetes ([facility]     Diabetes mellitus ([facility]     Hyperlipidemia     Hypertension       Past Surgical History:   Procedure Laterality Date    APPENDECTOMY      COLONOSCOPY  6-24-14    severe fixed colon    EYE SURGERY      Cataracs    HYSTERECTOMY        Family History   Problem Relation Age of Onset    Diabetes Mother     Heart Disease Mother     High Blood Pressure Mother     Diabetes Father     High Blood Pressure Father     Heart Disease Father       Infectious disease related family history - not contibutory. SOCIAL HISTORY  Social History     Tobacco Use    Smoking status: Never Smoker    Smokeless tobacco: Never Used   Substance Use Topics    Alcohol use: Yes     Comment: socially only       Born:   Lived   Occupation:   No recent travel of significance.  No recent unusual exposures.  NO pets    ? ALLERGIES  Allergies   Allergen Reactions    Lisinopril Anaphylaxis      MEDICATIONS  Reviewed and are per the chart/EMR. ? Antibiotics:   Vancomycin  ?  -------------------------------------------------------------------------------------------------------------------    Vital Signs:  Vitals:    03/11/19 1200   BP: (!) 109/54   Pulse: 72   Resp: 19   Temp: 97.6 °F (36.4 °C)   SpO2: 93%         Exam:    VS: noted; wt 58.2 kg  Gen: alert and oriented X3, no distress  Skin: no stigmata of endocarditis  Wounds: C/D/I  HEMT: AT/NC Oropharynx pink, moist, and without lesions or exudates; dentition in good state of repair  Eyes: PERRLA, EOMI, conjunctiva pink, sclera anicteric. Neck: Supple.

## 2019-03-11 NOTE — PROGRESS NOTES
[facility]        Progress Note       3/11/2019  8:13 AM    Patient:    Kelly Alvarez  : 1930   80 y.o. MRN: [id number]  Admitted: 3/8/2019  2:02 PM ATT: Laura Tran MD   -A  AdmitDx: GIB (gastrointestinal bleeding) [K92.2]  Anticoagulated [Z79.01]  Gastrointestinal hemorrhage, unspecified gastrointestinal hemorrhage type [K92.2]  Acute nonintractable headache, unspecified headache type [R51]  Anemia, unspecified type [D64.9]  PCP: Jose Salgado MD    SUBJECTIVE:  Chart reviewed, events noted  Patient feeling somewhat better. Four watery stools yesterday and three overnight. No gross blood per RN. Tolerating small amount of clear liquid diet. No N/V. Denies abdominal pain.     ROS:  The positive ROS will be identified in bold     CONSTITUTIONAL:  Neg  Weight loss, fatigue, fever  MOUTH/THROAT:  Neg  Bleeding gums, hoarseness or sore throat  RESPIRATORY:   Neg SOB, wheeze, cough, hemoptysis or bronchitis  CARDIOVASCULAR:  Neg Chest pain, palpitations, dyspnea on exertion, edema  GASTROINTESTINAL:  SEE HPI  HEMATOLOGIC/LYMPHATIC:  Neg bleeding tendency, + anemia   MUSCULOSKELETAL: Neg  New myalgias, joint pain, swelling or stiffness  NEUROLOGICAL:  Neg  Loss of Consciousness, memory loss, forgetfulness, periods of confusion, difficulty concentrating, seizures, insomnia, aphasia    SKIN:  Neg No itching, rashes, or sores  PSYCHIATRIC:  Neg Depression, personality changes, anxiety    OBJECTIVE:      BP (!) 163/90   Pulse 99   Temp 97.4 °F (36.3 °C) (Oral)   Resp 24   Ht 5' 5\" (1.651 m)   Wt 128 lb 3.2 oz (58.2 kg)   SpO2 95%   BMI 21.33 kg/m²     NAD, appears comfortable, lying in bed   Lips and mucous membranes pink and moist  RRR, Nl s1s2   Diminished bases bilaterally, respirations even and unlabored   Abdomen soft, ND, NT,  bowel sounds normal  Skin pink, warm and dry  No edema bilateral lower extremities   AAOx3     CBC:   Recent Labs 03/09/19  0746  03/09/19  2359 03/10/19  0720 03/11/19  0254   WBC 8.5  --   --  7.5 8.0   HGB 8.3*   < > 7.8* 7.6* 7.3*     --   --  250 278    < > = values in this interval not displayed. BMP:    Recent Labs     03/09/19  0746 03/10/19  0720 03/11/19  0254   * 132* 132*   K 3.4* 3.2* 4.6    103 103   CO2 20* 19* 19*   BUN 20 17 16   CREATININE 1.7* 1.6* 1.5*   GLUCOSE 80 123* 100*     Magnesium:   Lab Results   Component Value Date    MG 1.5 03/10/2019     Hepatic:   Recent Labs     03/08/19  1437   AST 13*   ALT 9*   BILITOT 0.2   ALKPHOS 52     INR:   Recent Labs     03/08/19  1437 03/09/19  0746 03/10/19  0720   INR 1.39 1.29 1.32         Intake/Output Summary (Last 24 hours) at 3/11/2019 0813  Last data filed at 3/11/2019 0505  Gross per 24 hour   Intake 1600 ml   Output --   Net 1600 ml         Medications    Scheduled Medications:    aspirin  81 mg Oral Daily    carvedilol  6.25 mg Oral BID WC    potassium chloride  40 mEq Oral BID    vancomycin  250 mg Oral 4x Daily    lactobacillus  1 capsule Oral TID WC    diltiazem  30 mg Oral 4x Daily    isosorbide mononitrate  30 mg Oral Daily    famotidine (PEPCID) injection  20 mg Intravenous Daily     PRN Medications: tiZANidine, butalbital-acetaminophen-caffeine, morphine, ondansetron, sodium chloride flush, acetaminophen  Infusions:    lactated ringers 50 mL/hr at 03/11/19 0145            Patient Active Problem List   Diagnosis Code    Angioedema T78. 3XXA    Cellulitis of right arm L03. 113    Type 2 diabetes mellitus (HCC) E11.9    Stage 3 chronic kidney disease (HCC) N18.3    Anemia due to vitamin B12 deficiency D51.9    Essential hypertension I10    Debility R53.81    Uncontrolled pain R52    CKD (chronic kidney disease) stage 4, GFR 15-29 ml/min (HCC) N18.4    Gait disturbance R26.9    Sepsis (HCC) A41.9    GIB (gastrointestinal bleeding) K92.2    Iron deficiency anemia D50.9    Gastrointestinal hemorrhage associated with anorectal source K62.5    Anemia D64.9     Recommendations:    C-diff:  Recurrent, episode 1/2019  Multiple watery BMs yesterday and overnight  Increased Vancomycin to 500 mg QID po. ID on case, changed to Dificid 200 mg BID  Recommend soft diet   Repeat mag today     Intermittent hemotochezia/melena per EMR:  Anemia noted, no gross blood per RN yesterday or overnight. Hgb 7.3. Fe 17 and ferritin 1506 recently. Will monitor may be associated with infectious process  No recent GI work up. Colonoscopy 2014 incomplete d/t severe fixation of colon  Will monitor at this time    Discussed plan of care with patient      Patient clinical, biochemical, and radiological information discussed with Dr. Joy Holley. He agrees with the assessment and plan. Ra Underwood CNP  3/11/2019  8:13 AM     Pt seen 3/11/19. I have seen and examined the patient myself. I have reviewed the hospital care given to date and reviewed all pertinent labs and imaging. The plan was developed mutually at the time of visit with the patient, Kezia Hancock CNP and myself. I have spoken with the patient, nursing staff and provided written and verbal instructions. The above note has been reviewed and I agree with the assessment, diagnosis, and treatment plan with as suggested by Kezia Hancock CNP with changes made by me as above.     [address] [facility]

## 2019-03-11 NOTE — PROGRESS NOTES
Progress Note (Hospitalist, Internal Medicine)  IDENTIFYING INFORMATION   PATIENT:  Sara Gómez  MRN:  [id number]  ADMIT DATE: 3/8/2019  TIME OF EVALUATION: 3/11/2019 3:30 PM      HISTORY OF PRESENT ILLNESS   Sara Gómez is a 80 y.o. female with a past medical history of AFib on xarelto but stopped 2 days ago since running out of prescriptions, recent hx of C.diff colitis/sepsis discharged 1/2019 who presents to the ED due to acute torticollis of the neck since awaking up this morning. However, on review, she has reported BRBPR since last Saturday with recurrent episodes on tues and some on wed-thurs. On review, she her Hb has dropped by 2 points from her last recent Hb count. SUBJECTIVE     No bleeding but has clinical episodes of bleeding several times yesterday and today. Watery diarrhea.     MEDICATIONS   Medications Prior to Admission  Medications Prior to Admission: Cholecalciferol (VITAMIN D3) 5000 units TABS, Take by mouth daily  acetaminophen (TYLENOL) 325 MG tablet, Take 325 mg by mouth every 4 hours as needed for Pain (As needed)  isosorbide mononitrate (IMDUR) 30 MG extended release tablet, Take 1 tablet by mouth daily  rivaroxaban (XARELTO) 15 MG TABS tablet, Take 1 tablet by mouth Daily with supper  carvedilol (COREG) 6.25 MG tablet, Take 1 tablet by mouth 2 times daily (with meals)  diltiazem (CARDIZEM) 30 MG tablet, Take 1 tablet by mouth 4 times daily  lactobacillus (CULTURELLE) capsule, Take 1 capsule by mouth daily (with breakfast)    Current Medications  Current Facility-Administered Medications   Medication Dose Route Frequency Provider Last Rate Last Dose    aspirin EC tablet 81 mg  81 mg Oral Daily Laura Badillo MD   81 mg at 03/11/19 1007    Fidaxomicin (DIFICID) tablet 200 mg  200 mg Oral BID Stephen Das MD   200 mg at 03/11/19 1442    0.9 % sodium chloride infusion   Intravenous Continuous Laura Badillo MD 50 mL/hr at 03/11/19 1450      tiZANidine (ZANAFLEX) tablet 2 mg  2 mg Oral Q4H PRN Tomás Lombardo MD   2 mg at 03/11/19 1450    carvedilol (COREG) tablet 6.25 mg  6.25 mg Oral BID LOLA Lombardo MD   6.25 mg at 03/11/19 1007    lactobacillus (CULTURELLE) capsule 1 capsule  1 capsule Oral TID LOLA Lombardo MD   1 capsule at 03/11/19 1442    butalbital-acetaminophen-caffeine (FIORICET, ESGIC) per tablet 1 tablet  1 tablet Oral Q4H PRN Tomás Lombardo MD        morphine sulfate (PF) injection 2 mg  2 mg Intravenous Q30 Min PRN Tomás Lombardo MD   2 mg at 03/08/19 1802    ondansetron (ZOFRAN) injection 4 mg  4 mg Intravenous Q30 Min PRN Tomás Lombardo MD   4 mg at 03/08/19 1802    sodium chloride flush 0.9 % injection 10 mL  10 mL Intravenous PRN Tomás Lombardo MD   10 mL at 03/08/19 1618    diltiazem (CARDIZEM) tablet 30 mg  30 mg Oral 4x Daily Tomás Lombarod MD   30 mg at 03/11/19 1008    isosorbide mononitrate (IMDUR) extended release tablet 30 mg  30 mg Oral Daily Tomás Lombardo MD   30 mg at 03/11/19 1008    famotidine (PEPCID) injection 20 mg  20 mg Intravenous Daily Tomás Lombardo MD   20 mg at 03/11/19 1008    acetaminophen (TYLENOL) tablet 650 mg  650 mg Oral Q4H PRN Tomás Lombardo MD   650 mg at 03/09/19 2033         Allergies  Allergies   Allergen Reactions    Lisinopril Anaphylaxis       REVIEW OF SYSTEMS     Within above limitations. 14 point review of systems reviewed. Pertinent positive or negative as per HPI or otherwise negative per 14 point systems review. Reviewed 3/11/2019 at 3:30 PM    PHYSICAL EXAM       Blood pressure (!) 109/54, pulse 72, temperature 97.6 °F (36.4 °C), temperature source Oral, resp. rate 19, height 5' 5\" (1.651 m), weight 128 lb 3.2 oz (58.2 kg), SpO2 93 %, not currently breastfeeding. General - AAO x 3  Psych - Appropriate affect/speech. No agitation  Eyes - Eye lids intact. No scleral icterus  ENT - Lips wnl. External ear clear/dry/intact.  No thyromegaly on inspection  Neuro - No gross peripheral or central neuro deficits on inspection  Heart - Sinus. RRR. S1 and S2 present. No elevated JVD appreciated  Lung - Adequate air entry b/l, No crackles/wheezes appreciated  GI - Soft. No guarding/rigidity. BS+   - No CVA/suprapubic tenderness or palpable bladder distension  Skin - Intact. No rash/petechiae/ecchymosis. Warm extremities  MSK - stiffness of the neck muscle and lower occipital tenderness on tactile manipulation        LABS AND IMAGING   CBC  [unfilled]    Last 3 Hemoglobin  Lab Results   Component Value Date    HGB 7.3 2019    HGB 7.6 03/10/2019    HGB 7.8 2019     Last 3 WBC/ANC  Lab Results   Component Value Date    WBC 8.0 2019    WBC 7.5 03/10/2019    WBC 8.5 2019     No components found for: GRNLOCTYABS  Last 3 Platelets  No results found for: PLATELET  Chemistry  [unfilled]  [unfilled]  Lab Results   Component Value Date     2019     Coagulation Studies  Lab Results   Component Value Date    INR 1.32 03/10/2019     Liver Function Studies  Lab Results   Component Value Date    ALT 9 2019    AST 13 2019    ALKPHOS 52 2019       Recent Imaging    Elyssa Hinojosa #[id number] ([facility]) (80 y.o. F) (Adm: 19)    [facility] [id number]         Imaging Results (last 7 days)          Procedure Component Value Ref Range Date/Time     CTA NECK W CONTRAST [[id number]] Collected: 19 1632     Order Status: Completed Updated: 19 1641     Narrative:       EXAMINATION:  CTA OF THE HEAD WITH CONTRAST; CTA OF THE NECK 3/8/2019 4:26 pm; 3/8/2019  4:27 pm:    TECHNIQUE:  CTA of the head/brain was performed with the administration of intravenous  contrast. Multiplanar reformatted images are provided for review.  MIP images  are provided for review.  Dose modulation, iterative reconstruction, and/or  weight based adjustment of the mA/kV was utilized to reduce the radiation  dose to as low as reasonably achievable.; CTA of the neck was performed with  the administration glands demonstrate no  acute abnormality. BONES: Degenerative changes of the spine are noted. CTA HEAD:    ANTERIOR CIRCULATION: The internal carotid arteries are normal in course and  caliber without focal stenosis. The anterior cerebral and middle cerebral  arteries demonstrate no focal stenosis.  There are bilateral posterior  communicating arteries. POSTERIOR CIRCULATION: The posterior cerebral arteries demonstrate no focal  stenosis.  There are moderate to severe tandem stenosis of the distal  vertebral arteries bilaterally.  The basilar artery is normal in course and  caliber. BRAIN: See separately dictated noncontrast head CT report.     Impression:       60% stenosis of the proximal left cervical ICA. 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 No large vessel occlusion detected within the head or neck.     CTA HEAD W CONTRAST [[id number]] Collected: 03/08/19 1632     Order Status: Completed Updated: 03/08/19 1641     Narrative:       EXAMINATION:  CTA OF THE HEAD WITH CONTRAST; CTA OF THE NECK 3/8/2019 4:26 pm; 3/8/2019  4:27 pm:    TECHNIQUE:  CTA of the head/brain was performed with the administration of intravenous  contrast. Multiplanar reformatted images are provided for review.  MIP images  are provided for review. Dose modulation, iterative reconstruction, and/or  weight based adjustment of the mA/kV was utilized to reduce the radiation  dose to as low as reasonably achievable.; CTA of the neck was performed with  the administration of intravenous contrast. Multiplanar reformatted images  are provided for review.  MIP images are provided for review. Stenosis of the  internal carotid arteries measured using NASCET criteria. Dose modulation,  iterative reconstruction, and/or weight based adjustment of the mA/kV was  utilized to reduce the radiation dose to as low as reasonably achievable. COMPARISON:  None.     HISTORY:  ORDERING SYSTEM PROVIDED HISTORY: HA, new, neck pain  TECHNOLOGIST PROVIDED HISTORY:  Has a \"code stroke\" or \"stroke alert\" been called? ->No  Ordering Physician Provided Reason for Exam: headache, neck pain  Acuity: Unknown  Type of Exam: Unknown  Additional signs and symptoms: headache, neck pain  Relevant Medical/Surgical History: same contrast used for CTA NECK ; ORDERING  SYSTEM PROVIDED HISTORY: HA, new, neck pain  TECHNOLOGIST PROVIDED HISTORY:  Has a \"code stroke\" or \"stroke alert\" been called? ->No  Ordering Physician Provided Reason for Exam: headache  Acuity: Unknown  Type of Exam: Unknown  Additional signs and symptoms: pt states headache and neck stiffness  Relevant Medical/Surgical History: 75 ml isovue 370    FINDINGS:    CTA NECK:    AORTIC ARCH/ARCH VESSELS: There is a normal branch pattern of the aortic  arch. No significant stenosis is seen of the innominate artery or subclavian  arteries. CAROTID ARTERIES: The common carotid arteries are normal in appearance  without evidence of a flow limiting stenosis.  There is 60% stenosis of the  proximal left cervical ICA due to calcified plaque.  No dissection or  arterial injury is seen. VERTEBRAL ARTERIES: The vertebral arteries both arise from the subclavian  arteries and are normal in caliber without evidence of flow limiting stenosis  or dissection. SOFT TISSUES: The lung apices are clear.  No cervical or superior mediastinal  lymphadenopathy.  The visualized portion of the larynx and pharynx appear  unremarkable.  The parotid, submandibular and thyroid glands demonstrate no  acute abnormality. BONES: Degenerative changes of the spine are noted. CTA HEAD:    ANTERIOR CIRCULATION: The internal carotid arteries are normal in course and  caliber without focal stenosis. The anterior cerebral and middle cerebral  arteries demonstrate no focal stenosis.  There are bilateral posterior  communicating arteries.     POSTERIOR CIRCULATION: The posterior cerebral arteries

## 2019-03-11 NOTE — PROGRESS NOTES
Physical Therapy    Physical Therapy Treatment Note  Name: Josefina Dewitt MRN: [id number] :   1930   Date:  3/11/2019   Admission Date: 3/8/2019 Room:  -A   Restrictions/Precautions:        general precautions, fall risk  Communication with other providers:  Okay to do tx per nurse Keisha  Subjective:  Patient states:  \"I have a bad headache; I just want to sleep; I need to go now. \" \" I had Fabien Hews for 28 days last month. \"  Pain:   Location, Type, Intensity (0/10 to 10/10):  HA R side 8/10. Demonstrated rectum pain with pericare. Objective:    Observation:  Supine in bed with blanket on head. Agreeable to tx with motivation    Treatment, including education/measures:  AP, LAQ, marches x 10 ea. Fwd flex with deep breathing ex x 3     Claudia roll R/L vc/tc for proper technique, sequencing, and safety to improve bed mobility. SBA bridging R<>L  vc/tc for proper technique, sequencing, and safety to improve bed mobility. Claudia sup>sit vc/tc for proper technique, sequencing, and safety to improve bed mobility. SBA sit<>stand no AD vc/tc for proper technique, sequencing, and safety to improve functional mobility. SBA amb ~ 3ft bed<>commode Claudia vc/tc for proper posture, posterior direction and safety to improve functional mobility. SBA sitting balance ~10 min; ~10 minutes vc/tc for proper posture, UE support and safety to improve functional mobility. Assessment / Impression:       Patient's tolerance of treatment:  good   Adverse Reaction: urgency, migraine, fatigue  Significant change in status and impact:  none  Barriers to improvement:  Urgency, strength, and safety    Safety  Patient left safely in the sup with RN in room, with call light/phone in reach with alarm applied. Gait belt was used for transfers and gait. Plan for Next Session:    Cont POC.    Time in:  925  Time out:    Timed treatment minutes:  50  Total treatment time:  40    Previously filed items:             Electronically signed by:    Elizabeth Morrow, PTA  3/11/2019, 10:10 AM

## 2019-03-11 NOTE — PLAN OF CARE
Problem: Falls - Risk of:  Goal: Will remain free from falls  Description  Will remain free from falls  3/11/2019 1120 by Dale Curtis RN  Outcome: Ongoing  3/11/2019 0221 by Virginia Leon RN  Outcome: Ongoing     Problem: Safety:  Goal: Free from accidental physical injury  Description  Free from accidental physical injury  3/11/2019 1120 by Dale Curtis RN  Outcome: Ongoing  3/11/2019 0221 by Virginia Leon RN  Outcome: Ongoing     Problem: Falls - Risk of:  Goal: Absence of physical injury  Description  Absence of physical injury  3/11/2019 1120 by Dale Curtis RN  Outcome: Ongoing  3/11/2019 0221 by Virginia Leon RN  Outcome: Ongoing     Problem: Daily Care:  Goal: Daily care needs are met  Description  Daily care needs are met  3/11/2019 1120 by Dale Curtis RN  Outcome: Ongoing  3/11/2019 0221 by Virginia Leon RN  Outcome: Ongoing     Problem: Pain:  Goal: Pain level will decrease  Description  Pain level will decrease  3/11/2019 1120 by Dale Curtis RN  Outcome: Ongoing  3/11/2019 0221 by Virginia Leon RN  Outcome: Ongoing     Problem: Skin Integrity:  Goal: Skin integrity will stabilize  Description  Skin integrity will stabilize  3/11/2019 1120 by Dale Curtis RN  Outcome: Ongoing  3/11/2019 0221 by Virginia Leon RN  Outcome: Ongoing

## 2019-03-11 NOTE — CARE COORDINATION
Spoke with Dr Lara Davis re: medications for C Diff. Most are cost prohibitive. He will research meds and make a decision for discharge. PT/OT recommending return home with OP PT/OT to follow. Will need [facility] if return to home ([address] 44) .  Liyah Telles RN

## 2019-03-11 NOTE — PROGRESS NOTES
After reviewing the telemetry strip it appears pts HR was in the 40's not 34bpm, afib on the monitor, 2100 dose of Cardizem held.

## 2019-03-12 LAB
ABO/RH: NORMAL
ABO/RH: NORMAL
ANION GAP SERPL CALCULATED.3IONS-SCNC: 8 MMOL/L (ref 4–16)
ANTIBODY SCREEN: NEGATIVE
ANTIBODY SCREEN: NEGATIVE
BASOPHILS ABSOLUTE: 0 K/CU MM
BASOPHILS RELATIVE PERCENT: 0.2 % (ref 0–1)
BUN BLDV-MCNC: 16 MG/DL (ref 6–23)
CALCIUM SERPL-MCNC: 7.8 MG/DL (ref 8.3–10.6)
CHLORIDE BLD-SCNC: 107 MMOL/L (ref 99–110)
CO2: 18 MMOL/L (ref 21–32)
COMPONENT: NORMAL
CREAT SERPL-MCNC: 1.6 MG/DL (ref 0.6–1.1)
CROSSMATCH RESULT: NORMAL
DIFFERENTIAL TYPE: ABNORMAL
EOSINOPHILS ABSOLUTE: 0.2 K/CU MM
EOSINOPHILS RELATIVE PERCENT: 2.9 % (ref 0–3)
GFR AFRICAN AMERICAN: 37 ML/MIN/1.73M2
GFR NON-AFRICAN AMERICAN: 30 ML/MIN/1.73M2
GLUCOSE BLD-MCNC: 107 MG/DL (ref 70–99)
HCT VFR BLD CALC: 27.5 % (ref 37–47)
HCT VFR BLD CALC: 28 % (ref 37–47)
HCT VFR BLD CALC: 28.3 % (ref 37–47)
HEMOGLOBIN: 8.1 GM/DL (ref 12.5–16)
HEMOGLOBIN: 8.5 GM/DL (ref 12.5–16)
HEMOGLOBIN: 8.7 GM/DL (ref 12.5–16)
IMMATURE NEUTROPHIL %: 1.1 % (ref 0–0.43)
LACTATE: 0.7 MMOL/L (ref 0.4–2)
LYMPHOCYTES ABSOLUTE: 1.2 K/CU MM
LYMPHOCYTES RELATIVE PERCENT: 14.6 % (ref 24–44)
MAGNESIUM: 2.4 MG/DL (ref 1.8–2.4)
MCH RBC QN AUTO: 30 PG (ref 27–31)
MCHC RBC AUTO-ENTMCNC: 31.1 % (ref 32–36)
MCV RBC AUTO: 96.6 FL (ref 78–100)
MONOCYTES ABSOLUTE: 0.4 K/CU MM
MONOCYTES RELATIVE PERCENT: 4.5 % (ref 0–4)
NUCLEATED RBC %: 0 %
PDW BLD-RTO: 15.3 % (ref 11.7–14.9)
PLATELET # BLD: 265 K/CU MM (ref 140–440)
PMV BLD AUTO: 8.8 FL (ref 7.5–11.1)
POTASSIUM SERPL-SCNC: 4.6 MMOL/L (ref 3.5–5.1)
RBC # BLD: 2.9 M/CU MM (ref 4.2–5.4)
SEGMENTED NEUTROPHILS ABSOLUTE COUNT: 6.3 K/CU MM
SEGMENTED NEUTROPHILS RELATIVE PERCENT: 76.7 % (ref 36–66)
SODIUM BLD-SCNC: 133 MMOL/L (ref 135–145)
STATUS: NORMAL
TOTAL IMMATURE NEUTOROPHIL: 0.09 K/CU MM
TOTAL NUCLEATED RBC: 0 K/CU MM
TRANSFUSION STATUS: NORMAL
UNIT DIVISION: 0
UNIT NUMBER: NORMAL
WBC # BLD: 8.3 K/CU MM (ref 4–10.5)

## 2019-03-12 PROCEDURE — 2580000003 HC RX 258: Performed by: INTERNAL MEDICINE

## 2019-03-12 PROCEDURE — 2700000000 HC OXYGEN THERAPY PER DAY

## 2019-03-12 PROCEDURE — 86850 RBC ANTIBODY SCREEN: CPT

## 2019-03-12 PROCEDURE — 2500000003 HC RX 250 WO HCPCS: Performed by: INTERNAL MEDICINE

## 2019-03-12 PROCEDURE — 6370000000 HC RX 637 (ALT 250 FOR IP): Performed by: INTERNAL MEDICINE

## 2019-03-12 PROCEDURE — 80048 BASIC METABOLIC PNL TOTAL CA: CPT

## 2019-03-12 PROCEDURE — 83605 ASSAY OF LACTIC ACID: CPT

## 2019-03-12 PROCEDURE — 94761 N-INVAS EAR/PLS OXIMETRY MLT: CPT

## 2019-03-12 PROCEDURE — 85018 HEMOGLOBIN: CPT

## 2019-03-12 PROCEDURE — 85014 HEMATOCRIT: CPT

## 2019-03-12 PROCEDURE — 86900 BLOOD TYPING SEROLOGIC ABO: CPT

## 2019-03-12 PROCEDURE — 83735 ASSAY OF MAGNESIUM: CPT

## 2019-03-12 PROCEDURE — 1200000000 HC SEMI PRIVATE

## 2019-03-12 PROCEDURE — 99232 SBSQ HOSP IP/OBS MODERATE 35: CPT | Performed by: INTERNAL MEDICINE

## 2019-03-12 PROCEDURE — 6360000002 HC RX W HCPCS: Performed by: INTERNAL MEDICINE

## 2019-03-12 PROCEDURE — 36415 COLL VENOUS BLD VENIPUNCTURE: CPT

## 2019-03-12 PROCEDURE — 86901 BLOOD TYPING SEROLOGIC RH(D): CPT

## 2019-03-12 PROCEDURE — 85025 COMPLETE CBC W/AUTO DIFF WBC: CPT

## 2019-03-12 RX ADMIN — SODIUM CHLORIDE: 9 INJECTION, SOLUTION INTRAVENOUS at 09:59

## 2019-03-12 RX ADMIN — ISOSORBIDE MONONITRATE 30 MG: 30 TABLET, EXTENDED RELEASE ORAL at 09:56

## 2019-03-12 RX ADMIN — BUTALBITAL, ACETAMINOPHEN AND CAFFEINE 1 TABLET: 50; 325; 40 TABLET ORAL at 10:36

## 2019-03-12 RX ADMIN — Medication 1 CAPSULE: at 13:43

## 2019-03-12 RX ADMIN — CARVEDILOL 6.25 MG: 6.25 TABLET, FILM COATED ORAL at 09:58

## 2019-03-12 RX ADMIN — FIDAXOMICIN 200 MG: 200 TABLET, FILM COATED ORAL at 09:55

## 2019-03-12 RX ADMIN — FAMOTIDINE 20 MG: 10 INJECTION, SOLUTION INTRAVENOUS at 09:56

## 2019-03-12 RX ADMIN — CARVEDILOL 6.25 MG: 6.25 TABLET, FILM COATED ORAL at 18:38

## 2019-03-12 RX ADMIN — Medication 1 CAPSULE: at 09:56

## 2019-03-12 RX ADMIN — DILTIAZEM HYDROCHLORIDE 30 MG: 30 TABLET, FILM COATED ORAL at 18:38

## 2019-03-12 RX ADMIN — DILTIAZEM HYDROCHLORIDE 30 MG: 30 TABLET, FILM COATED ORAL at 13:43

## 2019-03-12 RX ADMIN — ONDANSETRON 4 MG: 2 INJECTION INTRAMUSCULAR; INTRAVENOUS at 10:36

## 2019-03-12 RX ADMIN — DILTIAZEM HYDROCHLORIDE 30 MG: 30 TABLET, FILM COATED ORAL at 22:35

## 2019-03-12 RX ADMIN — DILTIAZEM HYDROCHLORIDE 30 MG: 30 TABLET, FILM COATED ORAL at 09:56

## 2019-03-12 RX ADMIN — FIDAXOMICIN 200 MG: 200 TABLET, FILM COATED ORAL at 22:35

## 2019-03-12 RX ADMIN — ASPIRIN 81 MG: 81 TABLET, COATED ORAL at 09:56

## 2019-03-12 RX ADMIN — Medication 1 CAPSULE: at 18:38

## 2019-03-12 ASSESSMENT — PAIN DESCRIPTION - FREQUENCY: FREQUENCY: CONTINUOUS

## 2019-03-12 ASSESSMENT — PAIN DESCRIPTION - ONSET: ONSET: PROGRESSIVE

## 2019-03-12 ASSESSMENT — PAIN - FUNCTIONAL ASSESSMENT: PAIN_FUNCTIONAL_ASSESSMENT: PREVENTS OR INTERFERES SOME ACTIVE ACTIVITIES AND ADLS

## 2019-03-12 ASSESSMENT — PAIN SCALES - GENERAL
PAINLEVEL_OUTOF10: 8
PAINLEVEL_OUTOF10: 7

## 2019-03-12 ASSESSMENT — PAIN DESCRIPTION - PROGRESSION

## 2019-03-12 ASSESSMENT — PAIN DESCRIPTION - LOCATION: LOCATION: HEAD

## 2019-03-12 ASSESSMENT — PAIN DESCRIPTION - PAIN TYPE: TYPE: ACUTE PAIN

## 2019-03-12 ASSESSMENT — PAIN DESCRIPTION - DESCRIPTORS: DESCRIPTORS: ACHING;HEADACHE;POUNDING

## 2019-03-12 NOTE — PROGRESS NOTES
Infectious Disease Progress Note  3/12/2019   Patient Name: Arina Salazar : 1930   Impression  · C diff colitis  ? Improving as stool frequency has lessened  ? No hypogammaglobulinemia  · CKD stage IV  ? Type 2 DM  · Multi-morbidity: per PMHx  Plan:  · continue Fidaxomicin for 10 days, may need a fidaxomicin taper  ? Zinplava 10 mg/kg will be of benefit      Ongoing Antimicrobial Therapy  Fidaxomicin 3/11  ? Completed Antimicrobial Therapy  Vancomycin 3/10-11? History:? Interval history noted, C diff colitis  Denies n/v/f or untoward effects of antibiotics  Physical Exam:  Vital Signs: /67   Pulse 81   Temp 98.6 °F (37 °C) (Oral)   Resp 18   Ht 5' 5\" (1.651 m)   Wt 128 lb 3.2 oz (58.2 kg)   SpO2 94%   BMI 21.33 kg/m²     Gen: alert and oriented X3, no distress  Skin: no stigmata of endocarditis  Wounds: C/D/I  HEMT: AT/NC Oropharynx pink, moist, and without lesions or exudates; dentition in good state of repair  Eyes: PERRLA, EOMI, conjunctiva pink, sclera anicteric. Neck: Supple. Trachea midline. No LAD. Chest: no distress and CTA. Good air movement. Heart: RRR and no MRG. Abd: soft, non-distended, no tenderness, no hepatomegaly. Normoactive bowel sounds. Ext: no clubbing, cyanosis, or edema  Catheter Site: without erythema or tenderness  Neuro: Mental status intact. CN 2-12 intact and no focal sensory or motor deficits     Radiologic / Imaging / TESTING  No results found.      Labs:    Recent Results (from the past 24 hour(s))   IgG, IgA, IgM    Collection Time: 19  6:56 PM   Result Value Ref Range    IgG, Serum 746 723 - 1,685 MG/DL    IgA 296 69 - 382 MG/DL    IgM,Serum 67 62 - 277 MG/DL   SPECIMEN REJECTION    Collection Time: 19  6:56 PM   Result Value Ref Range    Rejected Test [facility]     Reason for Rejection UNABLE TO PERFORM TESTING:     Reason for Rejection SPECIMEN CLOTTED    Hemoglobin and Hematocrit, Blood    Collection Time: 19  8:07 PM   Result Value Ref Range    Hemoglobin (LL) 12.5 - 16.0 GM/DL     6.4  HGB CALLED TO JOSE ANGLIN RN 4N ON [id number] AT 2032 NLUCAS MLT      Hematocrit 21.6 (L) 37 - 47 %   CBC Auto Differential    Collection Time: 03/12/19  5:32 AM   Result Value Ref Range    WBC 8.3 4.0 - 10.5 K/CU MM    RBC 2.90 (L) 4.2 - 5.4 M/CU MM    Hemoglobin 8.7 (L) 12.5 - 16.0 GM/DL    Hematocrit 28.0 (L) 37 - 47 %    MCV 96.6 78 - 100 FL    MCH 30.0 27 - 31 PG    MCHC 31.1 (L) 32.0 - 36.0 %    RDW 15.3 (H) 11.7 - 14.9 %    Platelets 757 875 - 917 K/CU MM    MPV 8.8 7.5 - 11.1 FL    Differential Type AUTOMATED DIFFERENTIAL     Segs Relative 76.7 (H) 36 - 66 %    Lymphocytes % 14.6 (L) 24 - 44 %    Monocytes % 4.5 (H) 0 - 4 %    Eosinophils % 2.9 0 - 3 %    Basophils % 0.2 0 - 1 %    Segs Absolute 6.3 K/CU MM    Lymphocytes # 1.2 K/CU MM    Monocytes # 0.4 K/CU MM    Eosinophils # 0.2 K/CU MM    Basophils # 0.0 K/CU MM    Nucleated RBC % 0.0 %    Total Nucleated RBC 0.0 K/CU MM    Total Immature Neutrophil 0.09 K/CU MM    Immature Neutrophil % 1.1 (H) 0 - 0.43 %   Basic Metabolic Panel    Collection Time: 03/12/19  5:32 AM   Result Value Ref Range    Sodium 133 (L) 135 - 145 MMOL/L    Potassium 4.6 3.5 - 5.1 MMOL/L    Chloride 107 99 - 110 mMol/L    CO2 18 (L) 21 - 32 MMOL/L    Anion Gap 8 4 - 16    BUN 16 6 - 23 MG/DL    CREATININE 1.6 (H) 0.6 - 1.1 MG/DL    Glucose 107 (H) 70 - 99 MG/DL    Calcium 7.8 (L) 8.3 - 10.6 MG/DL    GFR Non-African American 30 (L) >60 mL/min/1.73m2    GFR  37 (L) >60 mL/min/1.73m2   Lactic Acid, Plasma    Collection Time: 03/12/19  5:32 AM   Result Value Ref Range    Lactate 0.7 0.4 - 2.0 mMOL/L   TYPE AND SCREEN    Collection Time: 03/12/19  5:32 AM   Result Value Ref Range    ABO/Rh O NEGATIVE     Antibody Screen NEGATIVE    Magnesium    Collection Time: 03/12/19  5:32 AM   Result Value Ref Range    Magnesium 2.4 1.8 - 2.4 mg/dl     CULTURE results: Invalid input(s): BLOOD CULTURE,  URINE CULTURE, SURGICAL CULTURE    Diagnosis:  Patient Active Problem List   Diagnosis    Angioedema    Cellulitis of right arm    Type 2 diabetes mellitus ([facility] [address]    Stage 3 chronic kidney disease (HCC)    Anemia due to vitamin B12 deficiency    Essential hypertension    Debility    Uncontrolled pain    CKD (chronic kidney disease) stage 4, GFR 15-29 ml/min (HCC)    Gait disturbance    Sepsis (HCC)    GIB (gastrointestinal bleeding)    Iron deficiency anemia    Gastrointestinal hemorrhage associated with anorectal source    Anemia       Active Problems  Active Problems:    GIB (gastrointestinal bleeding)    Iron deficiency anemia    Gastrointestinal hemorrhage associated with anorectal source    Anemia  Resolved Problems:    * No resolved hospital problems.  *    Electronically signed by: Electronically signed by Rafia Call MD on 3/12/2019 at 11:48 AM

## 2019-03-12 NOTE — DISCHARGE INSTR - COC
Continuity of Care Form    Patient Name: Carmleo Gonzalez   :  1930  MRN:  [id number]    Admit date:  3/8/2019  Discharge date:  2019    Code Status Order: Full Code   Advance Directives:   [facility] Documentation     Date/Time Healthcare Directive Type of Healthcare Directive Copy in [facility] [address] Agent's Name Healthcare Agent's Phone Number    [phone]  No, patient does not have an advance directive for healthcare treatment -- -- -- -- --          Admitting Physician:  Facundo Lopez MD  PCP: Bianca Brooke MD    Discharging Nurse: AMBROSIO Trevino, [address] Unit/Room#: 4732/7996-A  Discharging Unit Phone Number: [phone]    Emergency Contact:   Extended Emergency Contact Information  Primary Emergency Contact: Carrie Atkinson of [address] [place] [address] Phone: [phone]  Mobile Phone: [phone]  Relation: Child  Secondary Emergency Contact: Jenna Forbes           [place], [facility]  Mobile Phone: [phone]  Relation: Child    Past Surgical History:  Past Surgical History:   Procedure Laterality Date    APPENDECTOMY      COLONOSCOPY  624-14    severe fixed colon    EYE SURGERY      Cataracs    HYSTERECTOMY         Immunization History:   Immunization History   Administered Date(s) Administered    Influenza Virus Vaccine 10/08/2014       Active Problems:  Patient Active Problem List   Diagnosis Code    Angioedema T78. 3XXA    Cellulitis of right arm L03. 113    Type 2 diabetes mellitus (HCC) E11.9    Stage 3 chronic kidney disease (HCC) N18.3    Anemia due to vitamin B12 deficiency D51.9    Essential hypertension I10    Debility R53.81    Uncontrolled pain R52    CKD (chronic kidney disease) stage 4, GFR 15-29 ml/min (HCC) N18.4    Gait disturbance R26.9    Sepsis (HCC) A41.9    GIB (gastrointestinal bleeding) K92.2    Iron deficiency anemia D50.9    Gastrointestinal hemorrhage associated with anorectal source K62.5    Anemia D64.9       Isolation/Infection:   Isolation          C Diff Contact        Patient Infection Status     Infection Encounter Level? Onset Date Added Added By Resolved Resolved By Review Date    C-diff (Clostridium difficile) Yes  03/11/19 Simón Javier RN   03/18/19    3/10/19 +C diff          Nurse Assessment:  Last Vital Signs: /67   Pulse 81   Temp 98.6 °F (37 °C) (Oral)   Resp 18   Ht 5' 5\" (1.651 m)   Wt 128 lb 3.2 oz (58.2 kg)   SpO2 94%   BMI 21.33 kg/m²     Last documented pain score (0-10 scale): Pain Level: 7  Last Weight:   Wt Readings from Last 1 Encounters:   03/11/19 128 lb 3.2 oz (58.2 kg)     Mental Status:  oriented and alert    IV Access:  - None    Nursing Mobility/ADLs:  Walking   Assisted  Transfer  Assisted  Bathing  Assisted  Dressing  Assisted  Toileting  Assisted  Feeding  Assisted  Med Admin  Assisted  Med Delivery   none    Wound Care Documentation and Therapy:        Elimination:  Continence:   · Bowel: Yes  · Bladder: Yes  Urinary Catheter: None   Colostomy/Ileostomy/Ileal Conduit: No       Date of Last BM: 03/13/2019    Intake/Output Summary (Last 24 hours) at 3/12/2019 1212  Last data filed at 3/12/2019 0544  Gross per 24 hour   Intake 1054.41 ml   Output --   Net 1054.41 ml     I/O last 3 completed shifts: In: 1084.4 [P.O.:150; I.V.:546.9;  Blood:387.5]  Out: -     Safety Concerns:     History of Falls (last 30 days)    Impairments/Disabilities:      Vision and Hearing        Patient's personal belongings (please select all that are sent with patient):  Glasses    RN SIGNATURE:  Electronically signed by Angus Castillo RN on 3/13/19 at 11:34 AM    CASE MANAGEMENT/SOCIAL WORK SECTION    Inpatient Status Date: ***    Readmission Risk Assessment Score:  Readmission Risk              Risk of Unplanned Readmission:        22           Discharging to Facility/ Agency   · Name:   · Address:  · Phone:  · Fax:    Dialysis Facility (if applicable)   · Name:  · Address:  · Dialysis Schedule:  · Phone:  · Fax:    / signature: {Esignature:[id number]}    PHYSICIAN SECTION  Nutrition Therapy:  Current Nutrition Therapy:   - Oral Diet:  Cardiac    Routes of Feeding: Oral  Liquids: Thin Liquids  Daily Fluid Restriction: no  Last Modified Barium Swallow with Video (Video Swallowing Test): not done    Treatments at the Time of Hospital Discharge:   Respiratory Treatments: none  Oxygen Therapy:  is not on home oxygen therapy. Ventilator:    - No ventilator support    Rehab Therapies: Physical Therapy and Occupational Therapy  Weight Bearing Status/Restrictions: No weight bearing restrictions  Other Medical Equipment (for information only, NOT a DME order):  cane  Other Treatments:     Prognosis: Fair    Condition at Discharge: Stable    Rehab Potential (if transferring to Rehab): Fair    Recommended Labs or Other Treatments After Discharge: Follow-up With  Details  Why  [address]   Tish Sanford MD  Schedule an appointment as soon as possible for a visit in 4 weeks  call to follow up with cardiology in 4 weeks to discuss if xarelto may be restarted  [address] [phone]   Van Johnson MD  Schedule an appointment as soon as possible for a visit in 2 weeks  call to follow up in 2 weeks with GI for post hospitalization f/u of rectal bleeding  [address] [facility]  Marya Wright MD  Schedule an appointment as soon as possible for a visit in 1 week  call to follow up with ID in 1 week to discuss further therapy for C.diff  [address]  [phone]         Physician Certification: I certify the above information and transfer of Emma Paci  is necessary for the continuing treatment of the diagnosis listed and that she requires [facility] for greater 30 days.      Update Admission H&P: No change in H&P    PHYSICIAN SIGNATURE:  Electronically signed by Hortencia Ramirez MD on 3/13/19 at 10:27 AM

## 2019-03-12 NOTE — PROGRESS NOTES
[facility]        Progress Note       3/12/2019  4:57 PM    Patient:    Vanda Miller  : 1930   80 y.o. MRN: [id number]  Admitted: 3/8/2019  2:02 PM ATT: Aleksandar Valero MD   [id number]/[id number]  AdmitDx: GIB (gastrointestinal bleeding) [K92.2]  Anticoagulated [Z79.01]  Gastrointestinal hemorrhage, unspecified gastrointestinal hemorrhage type [K92.2]  Acute nonintractable headache, unspecified headache type [R51]  Anemia, unspecified type [D64.9]  PCP: Zee Rosario MD    SUBJECTIVE:  Chart reviewed, events noted  Patient feeling well, but tired.   One loose BM this am.  Eating some breakfast.  Denies N/V.   ROS:  The positive ROS will be identified in bold     CONSTITUTIONAL:  Neg  Weight loss, fatigue, fever  RESPIRATORY:   Neg SOB, wheeze, cough, hemoptysis or bronchitis  CARDIOVASCULAR:  Neg Chest pain, palpitations, dyspnea on exertion, edema  GASTROINTESTINAL:  SEE HPI  HEMATOLOGIC/LYMPHATIC:  Neg  Anemia, bleeding tendency  MUSCULOSKELETAL: Neg  New myalgias, joint pain, swelling or stiffness  NEUROLOGICAL:  Neg  Loss of Consciousness, memory loss, forgetfulness, periods of confusion, difficulty concentrating, seizures, insomnia, aphasia        OBJECTIVE:      /67   Pulse 81   Temp 98.6 °F (37 °C) (Oral)   Resp 18   Ht 5' 5\" (1.651 m)   Wt 128 lb 3.2 oz (58.2 kg)   SpO2 94%   BMI 21.33 kg/m²     NAD, appears comfortable  Lips and mucous membranes pink and moist  RRR, Nl s1s2, no murmurs  Lungs CTA bilaterally, respirations even and unlabored   Abdomen soft, ND, NT, bowel sounds normal  Skin pale, warm and dry  No edema bilateral lower extremities   AAOx3    CBC:   Recent Labs     03/10/19  0719  0254 19  0532 19  1245   WBC 7.5 8.0  --  8.3  --    HGB 7.6* 7.3* 6.4  HGB CALLED TO JOSE ANGLIN RN 4N ON [id number]  NLUCAS MLT  * 8.7* 8.1*    278  --  265  --      BMP:    Recent Labs     03/10/19  0720 03/11/19  0254 03/12/19  0532   * 132* 133*   K 3.2* 4.6 4.6    103 107   CO2 19* 19* 18*   BUN 17 16 16   CREATININE 1.6* 1.5* 1.6*   GLUCOSE 123* 100* 107*     Magnesium:   Lab Results   Component Value Date    MG 2.4 03/12/2019     Hepatic: No results for input(s): AST, ALT, ALB, BILITOT, ALKPHOS in the last 72 hours. INR:   Recent Labs     03/10/19  0720   INR 1.32         Intake/Output Summary (Last 24 hours) at 3/12/2019 1657  Last data filed at 3/12/2019 0544  Gross per 24 hour   Intake 934.41 ml   Output --   Net 934.41 ml         Medications    Scheduled Medications:    aspirin  81 mg Oral Daily    Fidaxomicin  200 mg Oral BID    carvedilol  6.25 mg Oral BID WC    lactobacillus  1 capsule Oral TID WC    diltiazem  30 mg Oral 4x Daily    isosorbide mononitrate  30 mg Oral Daily    famotidine (PEPCID) injection  20 mg Intravenous Daily     PRN Medications: tiZANidine, butalbital-acetaminophen-caffeine, morphine, ondansetron, sodium chloride flush, acetaminophen  Infusions:    sodium chloride 50 mL/hr at 03/12/19 0959           Patient Active Problem List   Diagnosis Code    Angioedema T78. 3XXA    Cellulitis of right arm L03. 113    Type 2 diabetes mellitus (HCC) E11.9    Stage 3 chronic kidney disease (HCC) N18.3    Anemia due to vitamin B12 deficiency D51.9    Essential hypertension I10    Debility R53.81    Uncontrolled pain R52    CKD (chronic kidney disease) stage 4, GFR 15-29 ml/min (HCC) N18.4    Gait disturbance R26.9    Sepsis (HCC) A41.9    GIB (gastrointestinal bleeding) K92.2    Iron deficiency anemia D50.9    Gastrointestinal hemorrhage associated with anorectal source K62.5    Anemia D64. 9        Recommendations:     C-diff:  Recurrent, episode 1/2019  BM's decreased  Continue Fidaxomicin for 10 days, ID managing   Continue Probiotic   WBC 8.3    BRBPR resolved, 2 self-limited episodes in the week prior to admission  Anemia noted  Hgb 8.1.  Fe 17 and ferritin 46 recently. S/p 1 unit PRBC last night  Will monitor may be associated with infectious process  No recent GI work up. Colonoscopy 2014 incomplete d/t severe fixation of colon   May need GI workup in future    Discussed plan of care with patient     Patient clinical, biochemical, and radiological information discussed with Dr. Heber Denney  He agrees with the assessment and plan. MEGAN Blair CNP, CNP  3/12/2019  4:57 PM     She has anemia of chronic disease. Her BRBPR can be attributed to Cdiff colitis. I believe her hgb of 6.4 yesterday was spurious as she overcorrected with 1u PRBC. No need for endoscopic evaluation. Continue treatment of C.diff colitis with Dificid per ID. Will sign off. Call with questions/concerns. I have seen and examined the patient myself. I have reviewed the hospital care given to date and reviewed all pertinent labs and imaging. The plan was developed mutually at the time of visit with the patient, Nallely Cisse CNP and myself. I have spoken with the patient, nursing staff and provided written and verbal instructions. The above note has been reviewed and I agree with the assessment, diagnosis, and treatment plan with as suggested by Nallely Cisse CNP with changes made by me as above.     [address] [facility]

## 2019-03-12 NOTE — CARE COORDINATION
CM placed a white board for PT/OT updates/ evals for SNF placement   CM called Cherie at [facility]. Pt has been approved for SNF placement and can go whenever medically cleared. CM discussed medications with Cherie. Cyndi Murphy is to look at meds and call CM back after that review.   CM into see pt who was very happy with discharge plan to return to [facility]. CM informed her that CM will continue to follow. [address]  1345 CM spoke with Cyndi Murphy and they are still looking at medication Dificid. CM will await call [address]  1505 CM received call from Cyndi Murphy at [facility]. CM informed that pt is able to go to SNF whenever medically cleared. CM informed that the facility would like at all poss for a less expensive med than Dificin if poss. CM placed a PS to Dr Esteban Rea and informed of the above. CM will continue to follow. [address]  1515 CM called son William Ny and informed him that pt is able to go SNF at [facility] whenever medically cleared.  [address]

## 2019-03-12 NOTE — PROGRESS NOTES
Patient has had 2 small loose bms today, the second she did have a capsule in it partially intact. When examined it looked like a Culturelle capsule. Let Dr Karol Taylor and Dr Matilda Qureshi know they are discussing whether or not to discontinue.

## 2019-03-12 NOTE — PROGRESS NOTES
Progress Note (Hospitalist, Internal Medicine)  IDENTIFYING INFORMATION   PATIENT:  Josefina Dewitt  MRN:  [id number]  ADMIT DATE: 3/8/2019  TIME OF EVALUATION: 3/12/2019 9:01 AM      HISTORY OF PRESENT ILLNESS   Josefina Dewitt is a 80 y.o. female with a past medical history of AFib on xarelto but stopped 2 days ago since running out of prescriptions, recent hx of C.diff colitis/sepsis discharged 1/2019 who presents to the ED due to acute torticollis of the neck since awaking up this morning. However, on review, she has reported BRBPR since last Saturday with recurrent episodes on tues and some on wed-thurs. On review, she her Hb has dropped by 2 points from her last recent Hb count. SUBJECTIVE     Continues to be diarrhea this morning. Urge incontinent.  Observed stool, still diarrhea but less watery than compared to yesterday where it was just water    MEDICATIONS   Medications Prior to Admission  Medications Prior to Admission: Cholecalciferol (VITAMIN D3) 5000 units TABS, Take by mouth daily  acetaminophen (TYLENOL) 325 MG tablet, Take 325 mg by mouth every 4 hours as needed for Pain (As needed)  isosorbide mononitrate (IMDUR) 30 MG extended release tablet, Take 1 tablet by mouth daily  rivaroxaban (XARELTO) 15 MG TABS tablet, Take 1 tablet by mouth Daily with supper  carvedilol (COREG) 6.25 MG tablet, Take 1 tablet by mouth 2 times daily (with meals)  diltiazem (CARDIZEM) 30 MG tablet, Take 1 tablet by mouth 4 times daily  lactobacillus (CULTURELLE) capsule, Take 1 capsule by mouth daily (with breakfast)    Current Medications  Current Facility-Administered Medications   Medication Dose Route Frequency Provider Last Rate Last Dose    aspirin EC tablet 81 mg  81 mg Oral Daily Jonathan Kitchen MD   81 mg at 03/11/19 1007    Fidaxomicin (DIFICID) tablet 200 mg  200 mg Oral BID Mahi Cordova MD   200 mg at 03/11/19 8902    0.9 % sodium chloride infusion   Intravenous Continuous Jonathan Kitchen MD 50 mL/hr at affect/speech. No agitation  Eyes - Eye lids intact. No scleral icterus  ENT - Lips wnl. External ear clear/dry/intact. No thyromegaly on inspection  Neuro - No gross peripheral or central neuro deficits on inspection  Heart - Sinus. RRR. S1 and S2 present. No elevated JVD appreciated  Lung - Adequate air entry b/l, No crackles/wheezes appreciated  GI - Soft. No guarding/rigidity. BS+   - No CVA/suprapubic tenderness or palpable bladder distension  Skin - Intact. No rash/petechiae/ecchymosis. Warm extremities  MSK - neck stiffness better. LABS AND IMAGING   CBC  [unfilled]    Last 3 Hemoglobin  Lab Results   Component Value Date    HGB 8.7 03/12/2019    HGB  03/11/2019     6.4  HGB CALLED TO JOSE ANGLIN RN 4N ON [id number] AT 2032 NLUCAS MLT      HGB 7.3 03/11/2019     Last 3 WBC/ANC  Lab Results   Component Value Date    WBC 8.3 03/12/2019    WBC 8.0 03/11/2019    WBC 7.5 03/10/2019     No components found for: GRNLOCTYABS  Last 3 Platelets  No results found for: PLATELET  Chemistry  [unfilled]  [unfilled]  Lab Results   Component Value Date     03/07/2019     Coagulation Studies  Lab Results   Component Value Date    INR 1.32 03/10/2019     Liver Function Studies  Lab Results   Component Value Date    ALT 9 03/08/2019    AST 13 03/08/2019    ALKPHOS 52 03/08/2019       Recent Imaging    Jennifer Chavira #[id number] ([id number]) (80 y.o. F) (Adm: 03/08/19)    [facility] [id number]         Imaging Results (last 7 days)          Procedure Component Value Ref Range Date/Time     CTA NECK W CONTRAST [[id number]] Collected: 03/08/19 1632     Order Status: Completed Updated: 03/08/19 1641     Narrative:       EXAMINATION:  CTA OF THE HEAD WITH CONTRAST; CTA OF THE NECK 3/8/2019 4:26 pm; 3/8/2019  4:27 pm:    TECHNIQUE:  CTA of the head/brain was performed with the administration of intravenous  contrast. Multiplanar reformatted images are provided for review.  MIP images  are provided for review. Dose modulation, iterative reconstruction, and/or  weight based adjustment of the mA/kV was utilized to reduce the radiation  dose to as low as reasonably achievable.; CTA of the neck was performed with  the administration of intravenous contrast. Multiplanar reformatted images  are provided for review.  MIP images are provided for review. Stenosis of the  internal carotid arteries measured using NASCET criteria. Dose modulation,  iterative reconstruction, and/or weight based adjustment of the mA/kV was  utilized to reduce the radiation dose to as low as reasonably achievable. COMPARISON:  None. HISTORY:  ORDERING SYSTEM PROVIDED HISTORY: HA, new, neck pain  TECHNOLOGIST PROVIDED HISTORY:  Has a \"code stroke\" or \"stroke alert\" been called? ->No  Ordering Physician Provided Reason for Exam: headache, neck pain  Acuity: Unknown  Type of Exam: Unknown  Additional signs and symptoms: headache, neck pain  Relevant Medical/Surgical History: same contrast used for CTA NECK ; ORDERING  SYSTEM PROVIDED HISTORY: HA, new, neck pain  TECHNOLOGIST PROVIDED HISTORY:  Has a \"code stroke\" or \"stroke alert\" been called? ->No  Ordering Physician Provided Reason for Exam: headache  Acuity: Unknown  Type of Exam: Unknown  Additional signs and symptoms: pt states headache and neck stiffness  Relevant Medical/Surgical History: 75 ml isovue 370    FINDINGS:    CTA NECK:    AORTIC ARCH/ARCH VESSELS: There is a normal branch pattern of the aortic  arch. No significant stenosis is seen of the innominate artery or subclavian  arteries. CAROTID ARTERIES: The common carotid arteries are normal in appearance  without evidence of a flow limiting stenosis.  There is 60% stenosis of the  proximal left cervical ICA due to calcified plaque.  No dissection or  arterial injury is seen.     VERTEBRAL ARTERIES: The vertebral arteries both arise from the subclavian  arteries and are normal in caliber without evidence of flow limiting stenosis  or dissection. SOFT TISSUES: The lung apices are clear.  No cervical or superior mediastinal  lymphadenopathy.  The visualized portion of the larynx and pharynx appear  unremarkable.  The parotid, submandibular and thyroid glands demonstrate no  acute abnormality. BONES: Degenerative changes of the spine are noted. CTA HEAD:    ANTERIOR CIRCULATION: The internal carotid arteries are normal in course and  caliber without focal stenosis. The anterior cerebral and middle cerebral  arteries demonstrate no focal stenosis.  There are bilateral posterior  communicating arteries. POSTERIOR CIRCULATION: The posterior cerebral arteries demonstrate no focal  stenosis.  There are moderate to severe tandem stenosis of the distal  vertebral arteries bilaterally.  The basilar artery is normal in course and  caliber. BRAIN: See separately dictated noncontrast head CT report.     Impression:       60% stenosis of the proximal left cervical ICA. 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 No large vessel occlusion detected within the head or neck.     CTA HEAD W CONTRAST [[id number]] Collected: 03/08/19 1632     Order Status: Completed Updated: 03/08/19 1641     Narrative:       EXAMINATION:  CTA OF THE HEAD WITH CONTRAST; CTA OF THE NECK 3/8/2019 4:26 pm; 3/8/2019  4:27 pm:    TECHNIQUE:  CTA of the head/brain was performed with the administration of intravenous  contrast. Multiplanar reformatted images are provided for review.  MIP images  are provided for review. Dose modulation, iterative reconstruction, and/or  weight based adjustment of the mA/kV was utilized to reduce the radiation  dose to as low as reasonably achievable.; CTA of the neck was performed with  the administration of intravenous contrast. Multiplanar reformatted images  are provided for review.  MIP images are provided for review. Stenosis of the  internal carotid arteries measured using NASCET criteria.  Dose modulation,  iterative reconstruction, and/or weight based adjustment of the mA/kV was  utilized to reduce the radiation dose to as low as reasonably achievable. COMPARISON:  None. HISTORY:  ORDERING SYSTEM PROVIDED HISTORY: HA, new, neck pain  TECHNOLOGIST PROVIDED HISTORY:  Has a \"code stroke\" or \"stroke alert\" been called? ->No  Ordering Physician Provided Reason for Exam: headache, neck pain  Acuity: Unknown  Type of Exam: Unknown  Additional signs and symptoms: headache, neck pain  Relevant Medical/Surgical History: same contrast used for CTA NECK ; ORDERING  SYSTEM PROVIDED HISTORY: HA, new, neck pain  TECHNOLOGIST PROVIDED HISTORY:  Has a \"code stroke\" or \"stroke alert\" been called? ->No  Ordering Physician Provided Reason for Exam: headache  Acuity: Unknown  Type of Exam: Unknown  Additional signs and symptoms: pt states headache and neck stiffness  Relevant Medical/Surgical History: 75 ml isovue 370    FINDINGS:    CTA NECK:    AORTIC ARCH/ARCH VESSELS: There is a normal branch pattern of the aortic  arch. No significant stenosis is seen of the innominate artery or subclavian  arteries. CAROTID ARTERIES: The common carotid arteries are normal in appearance  without evidence of a flow limiting stenosis.  There is 60% stenosis of the  proximal left cervical ICA due to calcified plaque.  No dissection or  arterial injury is seen. VERTEBRAL ARTERIES: The vertebral arteries both arise from the subclavian  arteries and are normal in caliber without evidence of flow limiting stenosis  or dissection. SOFT TISSUES: The lung apices are clear.  No cervical or superior mediastinal  lymphadenopathy.  The visualized portion of the larynx and pharynx appear  unremarkable.  The parotid, submandibular and thyroid glands demonstrate no  acute abnormality. BONES: Degenerative changes of the spine are noted.       CTA HEAD:    ANTERIOR CIRCULATION: The internal carotid arteries are normal in course and  caliber without focal stenosis. The anterior cerebral and middle cerebral  arteries demonstrate no focal stenosis.  There are bilateral posterior  communicating arteries. POSTERIOR CIRCULATION: The posterior cerebral arteries demonstrate no focal  stenosis.  There are moderate to severe tandem stenosis of the distal  vertebral arteries bilaterally.  The basilar artery is normal in course and  caliber. BRAIN: See separately dictated noncontrast head CT report.     Impression:       60% stenosis of the proximal left cervical ICA. 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 No large vessel occlusion detected within the head or neck.     CT Head WO Contrast [[id number]] Collected: 03/08/19 1539     Order Status: Completed Updated: 03/08/19 1543     Narrative:       EXAMINATION:  CT OF THE HEAD WITHOUT CONTRAST  3/8/2019 3:35 pm    TECHNIQUE:  CT of the head was performed without the administration of intravenous  contrast. Dose modulation, iterative reconstruction, and/or weight based  adjustment of the mA/kV was utilized to reduce the radiation dose to as low  as reasonably achievable. COMPARISON:  None. HISTORY:  ORDERING SYSTEM PROVIDED HISTORY: HA, new  TECHNOLOGIST PROVIDED HISTORY:  Has a \"code stroke\" or \"stroke alert\" been called? ->No  Ordering Physician Provided Reason for Exam: HEADACHE  Acuity: Unknown  Type of Exam: Unknown  Additional signs and symptoms: PT STATES HEADACHE AD NECK STIFFNESS  Relevant Medical/Surgical History: NONE    FINDINGS:  BRAIN/VENTRICLES: The ventricles and sulci are diffusely enlarged.  Low  attenuation is seen in the periventricular and subcortical white matter.  No  acute intracranial hemorrhage or acute infarct is identified. ORBITS: The visualized portion of the orbits demonstrate no acute abnormality. SINUSES: The visualized paranasal sinuses and mastoid air cells demonstrate  no acute abnormality.     SOFT TISSUES/SKULL:  No acute abnormality of the visualized skull or soft  tissues.     Impression:       No acute intracranial abnormality. Diffuse atrophic changes with findings suggesting chronic microvascular  ischemia             Relevant labs and imaging reviewed    ASSESSMENT AND PLAN     BRBPR, acute blood loss anemia  Could be related to xarelto [facility] and possible C.diff  - progression of diet  - IVF  - appears resolved  - trend Hb daily - was low evening 3/11 and transfused 1 pRBC    Recent hx of C.diff colitis  Now with diarrhea that is confirmative of recurrent C.diff on testing  - repeat C.diff positive  - ID assisting, switched vanco to dificid 3/11/19  - probiotics  - continue dificid and monitor response - ID rec for 10 days - plan until 3/22/19      Torticollis  - PT/OT  - prn zanaflex  - massage therapy  - heating pad  - BP low since talking xanaflex - monitor and doing better with lower dose    AFib on xarelto - now ran out 2 days ago. She reports visiting with Dr Chris Ardon  - vida Luna due to bleeding  - substitute xarelto with 81 ASA for now and continue outpatient follow up with cardiology.  Once bleeding resolve, may consider restarting xarelto    Chronic diastolic CHF  CKD     SCD    PT/OT evaluate for placement      [address] [place] [address] Internal Medicine  3/12/2019 at 9:01 AM

## 2019-03-13 VITALS
HEIGHT: 65 IN | HEART RATE: 84 BPM | SYSTOLIC BLOOD PRESSURE: 182 MMHG | DIASTOLIC BLOOD PRESSURE: 81 MMHG | BODY MASS INDEX: 21.36 KG/M2 | RESPIRATION RATE: 18 BRPM | TEMPERATURE: 98.1 F | OXYGEN SATURATION: 92 % | WEIGHT: 128.2 LBS

## 2019-03-13 LAB
ANION GAP SERPL CALCULATED.3IONS-SCNC: 10 MMOL/L (ref 4–16)
BASOPHILS ABSOLUTE: 0 K/CU MM
BASOPHILS RELATIVE PERCENT: 0.6 % (ref 0–1)
BUN BLDV-MCNC: 15 MG/DL (ref 6–23)
CALCIUM SERPL-MCNC: 8 MG/DL (ref 8.3–10.6)
CHLORIDE BLD-SCNC: 110 MMOL/L (ref 99–110)
CO2: 16 MMOL/L (ref 21–32)
CREAT SERPL-MCNC: 1.5 MG/DL (ref 0.6–1.1)
DIFFERENTIAL TYPE: ABNORMAL
EOSINOPHILS ABSOLUTE: 0.4 K/CU MM
EOSINOPHILS RELATIVE PERCENT: 6.4 % (ref 0–3)
GFR AFRICAN AMERICAN: 40 ML/MIN/1.73M2
GFR NON-AFRICAN AMERICAN: 33 ML/MIN/1.73M2
GLUCOSE BLD-MCNC: 101 MG/DL (ref 70–99)
HCT VFR BLD CALC: 30.4 % (ref 37–47)
HEMOGLOBIN: 9.3 GM/DL (ref 12.5–16)
IMMATURE NEUTROPHIL %: 1.4 % (ref 0–0.43)
LACTATE: 1 MMOL/L (ref 0.4–2)
LYMPHOCYTES ABSOLUTE: 1.7 K/CU MM
LYMPHOCYTES RELATIVE PERCENT: 26.6 % (ref 24–44)
MCH RBC QN AUTO: 29.6 PG (ref 27–31)
MCHC RBC AUTO-ENTMCNC: 30.6 % (ref 32–36)
MCV RBC AUTO: 96.8 FL (ref 78–100)
MONOCYTES ABSOLUTE: 0.4 K/CU MM
MONOCYTES RELATIVE PERCENT: 5.6 % (ref 0–4)
NUCLEATED RBC %: 0 %
PDW BLD-RTO: 15.4 % (ref 11.7–14.9)
PLATELET # BLD: 305 K/CU MM (ref 140–440)
PMV BLD AUTO: 8.7 FL (ref 7.5–11.1)
POTASSIUM SERPL-SCNC: 4.6 MMOL/L (ref 3.5–5.1)
RBC # BLD: 3.14 M/CU MM (ref 4.2–5.4)
SEGMENTED NEUTROPHILS ABSOLUTE COUNT: 3.7 K/CU MM
SEGMENTED NEUTROPHILS RELATIVE PERCENT: 59.4 % (ref 36–66)
SODIUM BLD-SCNC: 136 MMOL/L (ref 135–145)
TOTAL IMMATURE NEUTOROPHIL: 0.09 K/CU MM
TOTAL NUCLEATED RBC: 0 K/CU MM
WBC # BLD: 6.3 K/CU MM (ref 4–10.5)

## 2019-03-13 PROCEDURE — 97530 THERAPEUTIC ACTIVITIES: CPT

## 2019-03-13 PROCEDURE — 6370000000 HC RX 637 (ALT 250 FOR IP): Performed by: INTERNAL MEDICINE

## 2019-03-13 PROCEDURE — 85025 COMPLETE CBC W/AUTO DIFF WBC: CPT

## 2019-03-13 PROCEDURE — 83605 ASSAY OF LACTIC ACID: CPT

## 2019-03-13 PROCEDURE — 85014 HEMATOCRIT: CPT

## 2019-03-13 PROCEDURE — 97110 THERAPEUTIC EXERCISES: CPT

## 2019-03-13 PROCEDURE — 80048 BASIC METABOLIC PNL TOTAL CA: CPT

## 2019-03-13 PROCEDURE — 2500000003 HC RX 250 WO HCPCS: Performed by: INTERNAL MEDICINE

## 2019-03-13 PROCEDURE — 85018 HEMOGLOBIN: CPT

## 2019-03-13 PROCEDURE — 36415 COLL VENOUS BLD VENIPUNCTURE: CPT

## 2019-03-13 PROCEDURE — 97116 GAIT TRAINING THERAPY: CPT

## 2019-03-13 PROCEDURE — 2580000003 HC RX 258: Performed by: INTERNAL MEDICINE

## 2019-03-13 RX ORDER — ASPIRIN 81 MG/1
81 TABLET ORAL DAILY
Qty: 30 TABLET | Refills: 0 | Status: SHIPPED
Start: 2019-03-14 | End: 2019-07-17 | Stop reason: SDUPTHER

## 2019-03-13 RX ORDER — TIZANIDINE 2 MG/1
2 TABLET ORAL EVERY 4 HOURS PRN
Qty: 30 TABLET | Refills: 0 | Status: SHIPPED | OUTPATIENT
Start: 2019-03-13 | End: 2019-04-29 | Stop reason: ALTCHOICE

## 2019-03-13 RX ORDER — LACTOBACILLUS RHAMNOSUS GG 10B CELL
1 CAPSULE ORAL 3 TIMES DAILY
Qty: 90 CAPSULE | Refills: 1 | Status: SHIPPED
Start: 2019-03-13 | End: 2019-05-24 | Stop reason: ALTCHOICE

## 2019-03-13 RX ORDER — BUTALBITAL, ACETAMINOPHEN AND CAFFEINE 50; 325; 40 MG/1; MG/1; MG/1
1 TABLET ORAL EVERY 4 HOURS PRN
Qty: 180 TABLET | Refills: 3 | Status: SHIPPED
Start: 2019-03-13 | End: 2019-04-29 | Stop reason: ALTCHOICE

## 2019-03-13 RX ADMIN — FIDAXOMICIN 200 MG: 200 TABLET, FILM COATED ORAL at 11:46

## 2019-03-13 RX ADMIN — Medication 1 CAPSULE: at 11:46

## 2019-03-13 RX ADMIN — FAMOTIDINE 20 MG: 10 INJECTION, SOLUTION INTRAVENOUS at 10:01

## 2019-03-13 RX ADMIN — CARVEDILOL 6.25 MG: 6.25 TABLET, FILM COATED ORAL at 10:01

## 2019-03-13 RX ADMIN — DILTIAZEM HYDROCHLORIDE 30 MG: 30 TABLET, FILM COATED ORAL at 10:02

## 2019-03-13 RX ADMIN — ASPIRIN 81 MG: 81 TABLET, COATED ORAL at 10:01

## 2019-03-13 RX ADMIN — ISOSORBIDE MONONITRATE 30 MG: 30 TABLET, EXTENDED RELEASE ORAL at 10:01

## 2019-03-13 RX ADMIN — SODIUM CHLORIDE, PRESERVATIVE FREE 10 ML: 5 INJECTION INTRAVENOUS at 10:01

## 2019-03-13 RX ADMIN — Medication 1 CAPSULE: at 10:01

## 2019-03-13 NOTE — DISCHARGE SUMMARY
(58.2 kg), SpO2 92 %, not currently breastfeeding. General - AAO x 3  Psych - Appropriate affect/speech. No agitation  Eyes - Eye lids intact. No scleral icterus  ENT - Lips wnl. External ear clear/dry/intact. No thyromegaly on inspection  Heart - Sinus. RRR. S1 and S2 present. No elevated JVD appreciated  Lung - Adequate air entry b/l, No crackles/wheezes appreciated  GI -  Soft. No guarding/rigidity. BS+   - No CVA/suprapubic tenderness or palpable bladder distension  MSK - neck stiffness better      Significant Diagnostic Studies:   CBC:   Recent Labs     03/11/19  0254  03/12/19  0532 03/12/19  1245 03/12/19  1913 03/13/19  0858   WBC 8.0  --  8.3  --   --  6.3   HGB 7.3*   < > 8.7* 8.1* 8.5* 9.3*     --  265  --   --  305    < > = values in this interval not displayed. WBC   Date/Time Value Ref Range Status   03/13/2019 08:58 AM 6.3 4.0 - 10.5 K/CU MM Final   03/12/2019 05:32 AM 8.3 4.0 - 10.5 K/CU MM Final   03/11/2019 02:54 AM 8.0 4.0 - 10.5 K/CU MM Final     Hemoglobin   Date/Time Value Ref Range Status   03/13/2019 08:58 AM 9.3 (L) 12.5 - 16.0 GM/DL Final   03/12/2019 07:13 PM 8.5 (L) 12.5 - 16.0 GM/DL Final   03/12/2019 12:45 PM 8.1 (L) 12.5 - 16.0 GM/DL Final     Platelets   Date/Time Value Ref Range Status   03/13/2019 08:58  140 - 440 K/CU MM Final   03/12/2019 05:32  140 - 440 K/CU MM Final   03/11/2019 02:54  140 - 440 K/CU MM Final    CMP:  Recent Labs     03/11/19  0254 03/12/19  0532 03/13/19  0858   * 133* 136   K 4.6 4.6 4.6    107 110   CO2 19* 18* 16*   BUN 16 16 15   CREATININE 1.5* 1.6* 1.5*   CALCIUM 8.2* 7.8* 8.0*     Troponin: No results for input(s): TROPONINI in the last 72 hours. BNP: No results for input(s): BNP in the last 72 hours. Lipids: No results for input(s): CHOL, HDL in the last 72 hours.     Invalid input(s): LDLCALCU  ABGs:No results for input(s): PH, PIC4SOI, PO2ART, BE, YNN6ECG, CO2CT, O2SAT, LABCARB in the last 72 Kate Anand MD  Schedule an appointment as soon as possible for a visit in 4 weeks  call to follow up with cardiology in 4 weeks to discuss if xarelto may be restarted  [address] [facility], [address] [place] [address]  [phone]   Carolynn Torres MD  Schedule an appointment as soon as possible for a visit in 2 weeks  call to follow up in 2 weeks with GI for post hospitalization f/u of rectal bleeding  [address] [facility]  Naveen Santacruz MD  Schedule an appointment as soon as possible for a visit in 1 week  call to follow up with ID in 1 week to discuss further therapy for C.diff  [address]  [phone]            Discharge Physician Signed:   Drake RosadoAdCare Hospital of Worcesterist, Internal medicine  3/13/2019 at 10:28 AM    The patient was seen and examined on day of discharge and this discharge summary is in conjunction with any daily progress note from day of discharge.   Time spent on discharge in the examination, evaluation, counseling and review of medications and discharge plan: <30 minutes    Please forward this discharge summary to patient's PCP

## 2019-03-13 NOTE — CARE COORDINATION
CM informed that pt is discharged. Pt will returning to [facility]. CM called and notified Junie Medico at [facility] that pt is discharged and will be returning.   CM called [facility] and transport has been set up for 2 PM. CM remains available. CM called Cherie at [facility] and informed of transport time.  CM left [facility]

## 2019-03-13 NOTE — PROGRESS NOTES
Physical Therapy    Physical Therapy Treatment Note  Name: Guera Casas MRN: [id number] :   1930   Date:  3/13/2019   Admission Date: 3/8/2019 Room:  [address] [place] [address]   Restrictions/Precautions:          Communication with other providers:  Per nurse ok to tx and given BP readings. Subjective:  Patient states:  Pt agreeable to tx  Pain:   Location, Type, Intensity (0/10 to 10/10):  No c/o pain during tx  Objective:    Observation:  Alert and oriented  Treatment, including education/measures:  Blood pressure taken in :  Sup 144/81  Sitting 148/71  Standing 147/70  Pt c/o feeling dizzy when first sitting and again when first standing. Sup to sit sba  Sit<=>stand cga and cues for hand placement for safety from bed, chair, and bed side commode. Pt was sba for forrest care in sitting and was able to manage pj pants and depends with sba.  amb with rw [de-identified]' with cga and cues for posture and walker safety. Safety  Patient left safely in the chair, with call light/phone in reach with alarm applied. Gait belt was used for transfers and gait. Assessment / Impression:       Patient's tolerance of treatment:  good  Adverse Reaction: na  Significant change in status and impact:  na  Barriers to improvement:  Strength and safety  Plan for Next Session:    Cont.  POC  Time in:  1120  Time out:  1200  Timed treatment minutes:  40  Total treatment time:  40    Previously filed items:             Electronically signed by:    Dante Ferrara PTA  3/13/2019, 8:22 AM

## 2019-03-15 ENCOUNTER — TELEPHONE (OUTPATIENT)
Dept: INFECTIOUS DISEASES | Age: 84
End: 2019-03-15

## 2019-03-19 LAB
EKG ATRIAL RATE: 100 BPM
EKG DIAGNOSIS: NORMAL
EKG Q-T INTERVAL: 388 MS
EKG QRS DURATION: 86 MS
EKG QTC CALCULATION (BAZETT): 450 MS
EKG R AXIS: -37 DEGREES
EKG T AXIS: 86 DEGREES
EKG VENTRICULAR RATE: 81 BPM

## 2019-04-10 ENCOUNTER — HOSPITAL ENCOUNTER (INPATIENT)
Age: 84
LOS: 7 days | Discharge: NURSING, REHAB OR LONG-TERM CARE | DRG: 371 | End: 2019-04-17
Attending: EMERGENCY MEDICINE | Admitting: INTERNAL MEDICINE
Payer: MEDICARE

## 2019-04-10 ENCOUNTER — APPOINTMENT (OUTPATIENT)
Dept: CT IMAGING | Age: 84
DRG: 371 | End: 2019-04-10
Payer: MEDICARE

## 2019-04-10 DIAGNOSIS — R10.30 LOWER ABDOMINAL PAIN: Primary | ICD-10-CM

## 2019-04-10 DIAGNOSIS — K57.32 SIGMOID DIVERTICULITIS: ICD-10-CM

## 2019-04-10 PROBLEM — K52.9 COLITIS: Status: ACTIVE | Noted: 2019-04-10

## 2019-04-10 LAB
ALBUMIN SERPL-MCNC: 3.1 GM/DL (ref 3.4–5)
ALP BLD-CCNC: 50 IU/L (ref 40–129)
ALT SERPL-CCNC: 10 U/L (ref 10–40)
ANION GAP SERPL CALCULATED.3IONS-SCNC: 13 MMOL/L (ref 4–16)
AST SERPL-CCNC: 18 IU/L (ref 15–37)
BACTERIA: ABNORMAL /HPF
BASOPHILS ABSOLUTE: 0 K/CU MM
BASOPHILS RELATIVE PERCENT: 0.4 % (ref 0–1)
BILIRUB SERPL-MCNC: 0.4 MG/DL (ref 0–1)
BILIRUBIN URINE: NEGATIVE MG/DL
BLOOD, URINE: NEGATIVE
BUN BLDV-MCNC: 20 MG/DL (ref 6–23)
CALCIUM SERPL-MCNC: 8 MG/DL (ref 8.3–10.6)
CHLORIDE BLD-SCNC: 96 MMOL/L (ref 99–110)
CLARITY: CLEAR
CO2: 21 MMOL/L (ref 21–32)
COLOR: YELLOW
CREAT SERPL-MCNC: 1.6 MG/DL (ref 0.6–1.1)
DIFFERENTIAL TYPE: ABNORMAL
EOSINOPHILS ABSOLUTE: 0.1 K/CU MM
EOSINOPHILS RELATIVE PERCENT: 0.6 % (ref 0–3)
GFR AFRICAN AMERICAN: 37 ML/MIN/1.73M2
GFR NON-AFRICAN AMERICAN: 30 ML/MIN/1.73M2
GLUCOSE BLD-MCNC: 116 MG/DL (ref 70–99)
GLUCOSE, URINE: NEGATIVE MG/DL
HCT VFR BLD CALC: 26.4 % (ref 37–47)
HEMOGLOBIN: 8 GM/DL (ref 12.5–16)
IMMATURE NEUTROPHIL %: 0.4 % (ref 0–0.43)
KETONES, URINE: NEGATIVE MG/DL
LACTATE: 0.7 MMOL/L (ref 0.4–2)
LEUKOCYTE ESTERASE, URINE: ABNORMAL
LIPASE: 15 IU/L (ref 13–60)
LYMPHOCYTES ABSOLUTE: 1 K/CU MM
LYMPHOCYTES RELATIVE PERCENT: 9.8 % (ref 24–44)
MCH RBC QN AUTO: 29.9 PG (ref 27–31)
MCHC RBC AUTO-ENTMCNC: 30.3 % (ref 32–36)
MCV RBC AUTO: 98.5 FL (ref 78–100)
MONOCYTES ABSOLUTE: 0.8 K/CU MM
MONOCYTES RELATIVE PERCENT: 7.5 % (ref 0–4)
NITRITE URINE, QUANTITATIVE: NEGATIVE
NUCLEATED RBC %: 0 %
PDW BLD-RTO: 14.6 % (ref 11.7–14.9)
PH, URINE: 5 (ref 5–8)
PLATELET # BLD: 261 K/CU MM (ref 140–440)
PMV BLD AUTO: 8.9 FL (ref 7.5–11.1)
POTASSIUM SERPL-SCNC: 4.2 MMOL/L (ref 3.5–5.1)
PROTEIN UA: 30 MG/DL
RBC # BLD: 2.68 M/CU MM (ref 4.2–5.4)
RBC URINE: 2 /HPF (ref 0–6)
SEGMENTED NEUTROPHILS ABSOLUTE COUNT: 8.2 K/CU MM
SEGMENTED NEUTROPHILS RELATIVE PERCENT: 81.3 % (ref 36–66)
SODIUM BLD-SCNC: 130 MMOL/L (ref 135–145)
SPECIFIC GRAVITY UA: 1.01 (ref 1–1.03)
SQUAMOUS EPITHELIAL: <1 /HPF
TOTAL IMMATURE NEUTOROPHIL: 0.04 K/CU MM
TOTAL NUCLEATED RBC: 0 K/CU MM
TOTAL PROTEIN: 6.6 GM/DL (ref 6.4–8.2)
TRICHOMONAS: ABNORMAL /HPF
UROBILINOGEN, URINE: NORMAL MG/DL (ref 0.2–1)
WBC # BLD: 10.1 K/CU MM (ref 4–10.5)
WBC UA: 18 /HPF (ref 0–5)

## 2019-04-10 PROCEDURE — 83605 ASSAY OF LACTIC ACID: CPT

## 2019-04-10 PROCEDURE — 99285 EMERGENCY DEPT VISIT HI MDM: CPT

## 2019-04-10 PROCEDURE — 87040 BLOOD CULTURE FOR BACTERIA: CPT

## 2019-04-10 PROCEDURE — 80053 COMPREHEN METABOLIC PANEL: CPT

## 2019-04-10 PROCEDURE — 85025 COMPLETE CBC W/AUTO DIFF WBC: CPT

## 2019-04-10 PROCEDURE — 74176 CT ABD & PELVIS W/O CONTRAST: CPT

## 2019-04-10 PROCEDURE — 1200000000 HC SEMI PRIVATE

## 2019-04-10 PROCEDURE — 83690 ASSAY OF LIPASE: CPT

## 2019-04-10 PROCEDURE — 6370000000 HC RX 637 (ALT 250 FOR IP): Performed by: EMERGENCY MEDICINE

## 2019-04-10 PROCEDURE — 81001 URINALYSIS AUTO W/SCOPE: CPT

## 2019-04-10 RX ORDER — DICYCLOMINE HYDROCHLORIDE 10 MG/ML
20 INJECTION INTRAMUSCULAR ONCE
Status: DISCONTINUED | OUTPATIENT
Start: 2019-04-10 | End: 2019-04-17 | Stop reason: HOSPADM

## 2019-04-10 RX ORDER — ISOSORBIDE MONONITRATE 30 MG/1
30 TABLET, EXTENDED RELEASE ORAL DAILY
Status: DISCONTINUED | OUTPATIENT
Start: 2019-04-11 | End: 2019-04-17 | Stop reason: HOSPADM

## 2019-04-10 RX ORDER — ONDANSETRON 2 MG/ML
4 INJECTION INTRAMUSCULAR; INTRAVENOUS PRN
Status: DISCONTINUED | OUTPATIENT
Start: 2019-04-10 | End: 2019-04-11 | Stop reason: SDUPTHER

## 2019-04-10 RX ORDER — ONDANSETRON 2 MG/ML
4 INJECTION INTRAMUSCULAR; INTRAVENOUS EVERY 6 HOURS PRN
Status: DISCONTINUED | OUTPATIENT
Start: 2019-04-10 | End: 2019-04-17 | Stop reason: HOSPADM

## 2019-04-10 RX ORDER — LACTOBACILLUS RHAMNOSUS GG 10B CELL
1 CAPSULE ORAL 3 TIMES DAILY
Status: DISCONTINUED | OUTPATIENT
Start: 2019-04-10 | End: 2019-04-17 | Stop reason: HOSPADM

## 2019-04-10 RX ORDER — ASPIRIN 81 MG/1
81 TABLET ORAL DAILY
Status: DISCONTINUED | OUTPATIENT
Start: 2019-04-11 | End: 2019-04-11 | Stop reason: SDUPTHER

## 2019-04-10 RX ORDER — ACETAMINOPHEN 500 MG
1000 TABLET ORAL ONCE
Status: COMPLETED | OUTPATIENT
Start: 2019-04-10 | End: 2019-04-10

## 2019-04-10 RX ORDER — SODIUM CHLORIDE 9 MG/ML
INJECTION, SOLUTION INTRAVENOUS CONTINUOUS
Status: DISCONTINUED | OUTPATIENT
Start: 2019-04-10 | End: 2019-04-13

## 2019-04-10 RX ORDER — ACETAMINOPHEN 325 MG/1
325 TABLET ORAL EVERY 4 HOURS PRN
Status: DISCONTINUED | OUTPATIENT
Start: 2019-04-10 | End: 2019-04-17 | Stop reason: HOSPADM

## 2019-04-10 RX ORDER — CARVEDILOL 6.25 MG/1
6.25 TABLET ORAL 2 TIMES DAILY WITH MEALS
Status: DISCONTINUED | OUTPATIENT
Start: 2019-04-11 | End: 2019-04-12

## 2019-04-10 RX ORDER — ASPIRIN 81 MG/1
81 TABLET ORAL DAILY
Status: DISCONTINUED | OUTPATIENT
Start: 2019-04-11 | End: 2019-04-17 | Stop reason: HOSPADM

## 2019-04-10 RX ADMIN — ACETAMINOPHEN 1000 MG: 500 TABLET ORAL at 21:42

## 2019-04-10 ASSESSMENT — PAIN SCALES - GENERAL
PAINLEVEL_OUTOF10: 0
PAINLEVEL_OUTOF10: 2

## 2019-04-10 NOTE — ED PROVIDER NOTES
eMERGENCY dEPARTMENT eNCOUnter    Attending note    I cared for and evaluated the patient in conjunction with the ED Advanced Practice Provider    HPI/Physical Exam/Medical Decision Making  Ofelia Tse is a 80 y.o. female here for evaluation of generalized abdominal pain associated with diarrhea. She was recently treated for Clostridium difficile with Fidaxomicin. She complains of generalized weakness and fatigue. She has had increased diarrhea over the past 5 days. Please see AURELIO note for further details. Vitals:   ED Triage Vitals [04/10/19 1812]   Enc Vitals Group      BP (!) 136/55      Pulse 67      Resp 18      Temp 100 °F (37.8 °C)      Temp Source Oral      SpO2 94 %      Weight 127 lb (57.6 kg)      Height 5' (1.524 m)      Head Circumference       Peak Flow       Pain Score       Pain Loc       Pain Edu? Excl. in [address]? On exam, the patient is afebrile and nontoxic appearing. She is hemodynamically stable and neurologically intact. Airway is patent. Neck is supple. Heart sounds have a regular rate and rhythm. Lungs are clear to auscultation bilaterally. The patient's abdomen is soft and non-distended. There is lower abdominal tenderness to palpation with no peritoneal signs. Extremities are warm, pink, and well perfused. Labs are obtained and is negative for chronic anemia and renal insufficiency with no other clinically significant lab abnormalities. Stool studies are pending. CT abdomen and pelvis is obtained and shows acute sigmoid diverticulitis. This may be due too recurrent C. diff. The patient was treated with Tylenol and Bentyl. I recommended admission to the hospital and the patient was agreeable. I spoke with the hospitalist who will admit the patient. All diagnostic, treatment, and disposition decisions were made by myself in conjunction with the advanced practice provider.     For all further details of the patient's emergency department visit, please see the advanced

## 2019-04-10 NOTE — ED PROVIDER NOTES
eMERGENCY dEPARTMENT eNCOUnter      PCP: Jose Salgado MD    [address] [facility]    Chief Complaint   Patient presents with    Abdominal Pain     reports abdominal pain, describes it as \"achiness\"    Diarrhea     patient reports diarrhea for 5 days; patient has a recent history sepsis       HPI    Kelly Alvarez is a 80 y.o. female who presents with generalized abdominal pain and achiness. She's had intermittent diarrhea throughout the day 5 or 6 times a day. Per chart patient was admitted on 3/8/2019, discharged on 3/13/2019. She was admitted for gastrointestinal bleeding. Anemia. To that patient was admitted in January for sepsis with associated abdominal pain. Ended up with positive C. Diff. At that time. Patient was then discharged to acute rehab prior to being discharged her condo. States that she was discharged most recently in March feeling somewhat better but still just does not feel herself. , Feels weak and fatigued denies abdominal pain and continues to have diarrhea. She believes she is on an antibiotic but unsure which ones. Per chart it appears Patient is on fidaxomicin and Elenore Emperor. REVIEW OF SYSTEMS    Constitutional:  Denies fever, chills, weight loss + generalized weakness   HENT:  Denies sore throat or ear pain   Cardiovascular:  Denies chest pain, palpitations or swelling   Respiratory:  Denies cough or shortness of breath   GI:  See HPI above  : No hematuria or dysuria. No vaginal symptoms. Denies pregnancy. No concern for STD  Musculoskeletal:  Denies back pain or groin pain or masses. No pain or swelling of extremities.   Skin:  Denies rash  Neurologic:  Denies headache, focal weakness or sensory changes   Endocrine:  Denies polyuria or polydypsia   Lymphatic:  Denies swollen glands     All other review of systems are negative  See HPI and nursing notes for additional information     PAST MEDICAL & SURGICAL HISTORY    Past Medical History:   Diagnosis Date    Asthma     Cancer ([facility]     basal cell carcinoma, melanoma    Diabetes ([facility]     Diabetes mellitus ([facility]     Hyperlipidemia     Hypertension      Past Surgical History:   Procedure Laterality Date    APPENDECTOMY      COLONOSCOPY  6-24-14    severe fixed colon    EYE SURGERY      Cataracs    HYSTERECTOMY         CURRENT MEDICATIONS        ALLERGIES    Allergies   Allergen Reactions    Lisinopril Anaphylaxis       SOCIAL AND FAMILY HISTORY    Social History     Socioeconomic History    Marital status:       Spouse name: None    Number of children: 3    Years of education: None    Highest education level: None   Occupational History    None   Social Needs    Financial resource strain: None    Food insecurity:     Worry: None     Inability: None    Transportation needs:     Medical: None     Non-medical: None   Tobacco Use    Smoking status: Never Smoker    Smokeless tobacco: Never Used   Substance and Sexual Activity    Alcohol use: Yes     Comment: socially only    Drug use: No    Sexual activity: None   Lifestyle    Physical activity:     Days per week: None     Minutes per session: None    Stress: None   Relationships    Social connections:     Talks on phone: None     Gets together: None     Attends [employer] service: None     Active member of club or organization: None     Attends meetings of clubs or organizations: None     Relationship status: None    Intimate partner violence:     Fear of current or ex partner: None     Emotionally abused: None     Physically abused: None     Forced sexual activity: None   Other Topics Concern    None   Social History Narrative    None     Family History   Problem Relation Age of Onset    Diabetes Mother     Heart Disease Mother     High Blood Pressure Mother     Diabetes Father     High Blood Pressure Father     Heart Disease Father        PHYSICAL EXAM    VITAL SIGNS: BP (!) 109/59   Pulse 85   Temp 97.5 °F (36.4 °C) (Oral) Resp 20   Ht 5' 5\" (1.651 m)   Wt 116 lb 8 oz (52.8 kg)   SpO2 96%   BMI 19.39 kg/m²   Constitutional:  Chronically ill appearing. No distress. Low grade temp  Eyes:  Sclera nonicteric, conjunctiva moist  HENT:  Atraumatic. PERRL. EOMI.  dry mucus membranes. Neck/Lymphatics: supple, no JVD, no swollen nodes  Respiratory:  No retractions, no accessory muscle use, normal breath sounds   Cardiovascular:  normal rate, normal rhythm, no murmurs    GI:     No gross discoloration.       -no Dagoberto's sign (periumbilical ecchymosis)       -no Grey-Kimble's sign (flank ecchymosis)    Bowel sounds present, no audible bruits. Soft,  No distention, no guarding, no rigidity,   + diffuse abdominal tenderness, no rebound, no palpable pulsatile masses,   No McBurney's point tenderness   Negative Rovsing sign    Negative Colon's sign. Back:   No CVA tenderness to percussion. Musculoskeletal:  No edema, no deformity  Vascular: DP pulses 2+ equal bilaterally  Integument: No rash, dry skin  Neurologic:  Alert & oriented, normal speech  Psychiatric: Cooperative, pleasant affect       LABS:  Results for orders placed or performed during the hospital encounter of 04/10/19   C difficile Molecular/PCR   Result Value Ref Range    Clostridium difficile, PCR PATIENT IS POSITIVE FOR C DIFFICILE,      Clostridium difficile, PCR       (NOTE)  REFERENCE RANGE: NEGATIVE  NO ADDITIONAL SPECIMENS WILL BE ACCEPTED FOR 7 DAYS WITHOUT   PHYSICIAN OVERRIDE. PHYSICIAN MAY CALL LAB IF ADDITIONAL TESTING IS   REQUIRED. LAB WILL NOT ACCEPT ADDITIONAL SPECIMENS TO TEST FOR CURE.        Ova and parasite screen   Result Value Ref Range    Cryptosporidium Antigen NEGATIVE NEGATIVE    Giardia Ag, Stl NEGATIVE NEGATIVE   CBC auto diff   Result Value Ref Range    WBC 10.1 4.0 - 10.5 K/CU MM    RBC 2.68 (L) 4.2 - 5.4 M/CU MM    Hemoglobin 8.0 (L) 12.5 - 16.0 GM/DL    Hematocrit 26.4 (L) 37 - 47 %    MCV 98.5 78 - 100 FL    MCH 29.9 27 - 31 PG    MCHC 30.3 (L) 32.0 - 36.0 %    RDW 14.6 11.7 - 14.9 %    Platelets 993 076 - 415 K/CU MM    MPV 8.9 7.5 - 11.1 FL    Differential Type AUTOMATED DIFFERENTIAL     Segs Relative 81.3 (H) 36 - 66 %    Lymphocytes % 9.8 (L) 24 - 44 %    Monocytes % 7.5 (H) 0 - 4 %    Eosinophils % 0.6 0 - 3 %    Basophils % 0.4 0 - 1 %    Segs Absolute 8.2 K/CU MM    Lymphocytes # 1.0 K/CU MM    Monocytes # 0.8 K/CU MM    Eosinophils # 0.1 K/CU MM    Basophils # 0.0 K/CU MM    Nucleated RBC % 0.0 %    Total Nucleated RBC 0.0 K/CU MM    Total Immature Neutrophil 0.04 K/CU MM    Immature Neutrophil % 0.4 0 - 0.43 %   Comprehensive Metabolic Panel   Result Value Ref Range    Sodium 130 (L) 135 - 145 MMOL/L    Potassium 4.2 3.5 - 5.1 MMOL/L    Chloride 96 (L) 99 - 110 mMol/L    CO2 21 21 - 32 MMOL/L    BUN 20 6 - 23 MG/DL    CREATININE 1.6 (H) 0.6 - 1.1 MG/DL    Glucose 116 (H) 70 - 99 MG/DL    Calcium 8.0 (L) 8.3 - 10.6 MG/DL    Alb 3.1 (L) 3.4 - 5.0 GM/DL    Total Protein 6.6 6.4 - 8.2 GM/DL    Total Bilirubin 0.4 0.0 - 1.0 MG/DL    ALT 10 10 - 40 U/L    AST 18 15 - 37 IU/L    Alkaline Phosphatase 50 40 - 129 IU/L    GFR Non-African American 30 (L) >60 mL/min/1.73m2    GFR  37 (L) >60 mL/min/1.73m2    Anion Gap 13 4 - 16   Lactic Acid, Plasma   Result Value Ref Range    Lactate 0.7 0.4 - 2.0 mMOL/L   Urinalysis   Result Value Ref Range    Color, UA YELLOW YELLOW    Clarity, UA CLEAR CLEAR    Glucose, Urine NEGATIVE NEGATIVE MG/DL    Bilirubin Urine NEGATIVE NEGATIVE MG/DL    Ketones, Urine NEGATIVE NEGATIVE MG/DL    Specific Gravity, UA 1.011 1.001 - 1.035    Blood, Urine NEGATIVE NEGATIVE    pH, Urine 5.0 5.0 - 8.0    Protein, UA 30 (A) NEGATIVE MG/DL    Urobilinogen, Urine NORMAL 0.2 - 1.0 MG/DL    Nitrite Urine, Quantitative NEGATIVE NEGATIVE    Leukocyte Esterase, Urine SMALL (A) NEGATIVE    RBC, UA 2 0 - 6 /HPF    WBC, UA 18 (H) 0 - 5 /HPF    Bacteria, UA RARE (A) NEGATIVE /HPF    Squam Epithel, UA <1 /HPF    Trichomonas, UA NONE SEEN NONE SEEN /HPF   Lipase   Result Value Ref Range    Lipase 15 13 - 60 IU/L   Blood occult stool screen #1   Result Value Ref Range    Occult Blood, Stool #1 POSITIVE (A) NEGATIVE   Rotavirus antigen, stool   Result Value Ref Range    Rotavirus NEGATIVE NEGATIVE   CBC Auto Differential   Result Value Ref Range    WBC 7.4 4.0 - 10.5 K/CU MM    RBC 2.70 (L) 4.2 - 5.4 M/CU MM    Hemoglobin 8.0 (L) 12.5 - 16.0 GM/DL    Hematocrit 25.8 (L) 37 - 47 %    MCV 95.6 78 - 100 FL    MCH 29.6 27 - 31 PG    MCHC 31.0 (L) 32.0 - 36.0 %    RDW 14.4 11.7 - 14.9 %    Platelets 805 383 - 735 K/CU MM    MPV 9.0 7.5 - 11.1 FL    Differential Type AUTOMATED DIFFERENTIAL     Segs Relative 64.4 36 - 66 %    Lymphocytes % 24.4 24 - 44 %    Monocytes % 9.2 (H) 0 - 4 %    Eosinophils % 1.1 0 - 3 %    Basophils % 0.5 0 - 1 %    Segs Absolute 4.8 K/CU MM    Lymphocytes # 1.8 K/CU MM    Monocytes # 0.7 K/CU MM    Eosinophils # 0.1 K/CU MM    Basophils # 0.0 K/CU MM    Nucleated RBC % 0.0 %    Total Nucleated RBC 0.0 K/CU MM    Total Immature Neutrophil 0.03 K/CU MM    Immature Neutrophil % 0.4 0 - 0.43 %   Basic Metabolic Panel   Result Value Ref Range    Sodium 134 (L) 135 - 145 MMOL/L    Potassium 3.4 (L) 3.5 - 5.1 MMOL/L    Chloride 101 99 - 110 mMol/L    CO2 23 21 - 32 MMOL/L    Anion Gap 10 4 - 16    BUN 19 6 - 23 MG/DL    CREATININE 1.7 (H) 0.6 - 1.1 MG/DL    Glucose 103 (H) 70 - 99 MG/DL    Calcium 8.1 (L) 8.3 - 10.6 MG/DL    GFR Non-African American 28 (L) >60 mL/min/1.73m2    GFR  34 (L) >60 mL/min/1.73m2   Magnesium   Result Value Ref Range    Magnesium 1.7 (L) 1.8 - 2.4 mg/dl   Lactic Acid, Plasma   Result Value Ref Range    Lactate 0.5 0.4 - 2.0 mMOL/L   Procalcitonin   Result Value Ref Range    Procalcitonin 0.155            RADIOLOGY/PROCEDURES       CT ABDOMEN PELVIS WO CONTRAST Additional Contrast? None (Final result)   Result time 04/10/19 21:15:22   Procedure changed from [address] [facility] [place] [address] IV adenopathy. Bones/Soft Tissues: No acute soft tissue abnormality. Diffuse osteopenia. Lumbar spine degenerative changes. ED COURSE & MEDICAL DECISION MAKING       Vital signs and nursing notes reviewed during ED course. Patient care and presentation staffed with supervising MD.  Dr. Magnus Falcon,  Patient seen by supervising MD today- see his/her note for details of the encounter. All pertinent Lab data and radiographic results reviewed with patient at bedside. The patient and/or the family were informed of the results of any tests/labs/imaging, the treatment plan, and time was allotted to answer questions. Pt presents as above. Vitals today show the pt is patient with a low-grade temp 94% on room air. Non-tachycardic. Labs and imaging ordered. Urinalysis unremarkable stool cultures pending. CBC without leukocytosis hemoglobin 8. CMP with elevated serum creatinine 1.6. Calcium at 8. Lactic acid normal lipase normal. CT of the pelvis shows acute sigmoid diverticulitis. Multiple lymph nodes noted within the sigmoid colon, could be reactive but will need be followed up. Patient see with my attending. My attending did speak with hospitalist regarding admission. Patient comfortable with this workup and plan. Clinical  IMPRESSION    1. Lower abdominal pain    2. Sigmoid diverticulitis          Admission    Comment: Please note this report has been produced using speech recognition software and may contain errors related to that system including errors in grammar, punctuation, and spelling, as well as words and phrases that may be inappropriate. If there are any questions or concerns please feel free to contact the dictating provider for clarification.        Amy Agrawal PA-C  04/11/19 [id number]

## 2019-04-11 LAB
ADENOVIRUS DETECTION BY PCR: NOT DETECTED
ANION GAP SERPL CALCULATED.3IONS-SCNC: 10 MMOL/L (ref 4–16)
BASOPHILS ABSOLUTE: 0 K/CU MM
BASOPHILS RELATIVE PERCENT: 0.5 % (ref 0–1)
BORDETELLA PERTUSSIS PCR: NOT DETECTED
BUN BLDV-MCNC: 19 MG/DL (ref 6–23)
CALCIUM SERPL-MCNC: 8.1 MG/DL (ref 8.3–10.6)
CHLAMYDOPHILA PNEUMONIA PCR: NOT DETECTED
CHLORIDE BLD-SCNC: 101 MMOL/L (ref 99–110)
CLOSTRIDIUM DIFFICILE, PCR: NORMAL
CLOSTRIDIUM DIFFICILE, PCR: NORMAL
CO2: 23 MMOL/L (ref 21–32)
CORONAVIRUS 229E PCR: NOT DETECTED
CORONAVIRUS HKU1 PCR: NOT DETECTED
CORONAVIRUS NL63 PCR: NOT DETECTED
CORONAVIRUS OC43 PCR: NOT DETECTED
CREAT SERPL-MCNC: 1.7 MG/DL (ref 0.6–1.1)
CRYPTOSPORIDIUM ANTIGEN: NEGATIVE
DIFFERENTIAL TYPE: ABNORMAL
EOSINOPHILS ABSOLUTE: 0.1 K/CU MM
EOSINOPHILS RELATIVE PERCENT: 1.1 % (ref 0–3)
GFR AFRICAN AMERICAN: 34 ML/MIN/1.73M2
GFR NON-AFRICAN AMERICAN: 28 ML/MIN/1.73M2
GIARDIA ANTIGEN STOOL: NEGATIVE
GLUCOSE BLD-MCNC: 103 MG/DL (ref 70–99)
HCT VFR BLD CALC: 25.8 % (ref 37–47)
HEMOCCULT SP1 STL QL: POSITIVE
HEMOGLOBIN: 8 GM/DL (ref 12.5–16)
HUMAN METAPNEUMOVIRUS PCR: NOT DETECTED
IMMATURE NEUTROPHIL %: 0.4 % (ref 0–0.43)
INFLUENZA A BY PCR: NOT DETECTED
INFLUENZA A H1 (2009) PCR: NOT DETECTED
INFLUENZA A H1 PANDEMIC PCR: NOT DETECTED
INFLUENZA A H3 PCR: NOT DETECTED
INFLUENZA B BY PCR: NOT DETECTED
LACTATE: 0.5 MMOL/L (ref 0.4–2)
LYMPHOCYTES ABSOLUTE: 1.8 K/CU MM
LYMPHOCYTES RELATIVE PERCENT: 24.4 % (ref 24–44)
MAGNESIUM: 1.7 MG/DL (ref 1.8–2.4)
MCH RBC QN AUTO: 29.6 PG (ref 27–31)
MCHC RBC AUTO-ENTMCNC: 31 % (ref 32–36)
MCV RBC AUTO: 95.6 FL (ref 78–100)
MONOCYTES ABSOLUTE: 0.7 K/CU MM
MONOCYTES RELATIVE PERCENT: 9.2 % (ref 0–4)
MYCOPLASMA PNEUMONIAE PCR: NOT DETECTED
NUCLEATED RBC %: 0 %
PARAINFLUENZA 1 PCR: NOT DETECTED
PARAINFLUENZA 2 PCR: NOT DETECTED
PARAINFLUENZA 3 PCR: NOT DETECTED
PARAINFLUENZA 4 PCR: NOT DETECTED
PDW BLD-RTO: 14.4 % (ref 11.7–14.9)
PLATELET # BLD: 222 K/CU MM (ref 140–440)
PMV BLD AUTO: 9 FL (ref 7.5–11.1)
POTASSIUM SERPL-SCNC: 3.4 MMOL/L (ref 3.5–5.1)
PROCALCITONIN: 0.15
RBC # BLD: 2.7 M/CU MM (ref 4.2–5.4)
RHINOVIRUS ENTEROVIRUS PCR: NOT DETECTED
ROTAVIRUS ANTIGEN: NEGATIVE
RSV PCR: NOT DETECTED
SEGMENTED NEUTROPHILS ABSOLUTE COUNT: 4.8 K/CU MM
SEGMENTED NEUTROPHILS RELATIVE PERCENT: 64.4 % (ref 36–66)
SODIUM BLD-SCNC: 134 MMOL/L (ref 135–145)
TOTAL IMMATURE NEUTOROPHIL: 0.03 K/CU MM
TOTAL NUCLEATED RBC: 0 K/CU MM
WBC # BLD: 7.4 K/CU MM (ref 4–10.5)

## 2019-04-11 PROCEDURE — 87046 STOOL CULTR AEROBIC BACT EA: CPT

## 2019-04-11 PROCEDURE — 6370000000 HC RX 637 (ALT 250 FOR IP): Performed by: INTERNAL MEDICINE

## 2019-04-11 PROCEDURE — 87798 DETECT AGENT NOS DNA AMP: CPT

## 2019-04-11 PROCEDURE — 99222 1ST HOSP IP/OBS MODERATE 55: CPT | Performed by: INTERNAL MEDICINE

## 2019-04-11 PROCEDURE — 1200000000 HC SEMI PRIVATE

## 2019-04-11 PROCEDURE — 94761 N-INVAS EAR/PLS OXIMETRY MLT: CPT

## 2019-04-11 PROCEDURE — 83735 ASSAY OF MAGNESIUM: CPT

## 2019-04-11 PROCEDURE — 36415 COLL VENOUS BLD VENIPUNCTURE: CPT

## 2019-04-11 PROCEDURE — 87324 CLOSTRIDIUM AG IA: CPT

## 2019-04-11 PROCEDURE — 6360000002 HC RX W HCPCS: Performed by: INTERNAL MEDICINE

## 2019-04-11 PROCEDURE — 87425 ROTAVIRUS AG IA: CPT

## 2019-04-11 PROCEDURE — 87045 FECES CULTURE AEROBIC BACT: CPT

## 2019-04-11 PROCEDURE — 80048 BASIC METABOLIC PNL TOTAL CA: CPT

## 2019-04-11 PROCEDURE — 87581 M.PNEUMON DNA AMP PROBE: CPT

## 2019-04-11 PROCEDURE — 85025 COMPLETE CBC W/AUTO DIFF WBC: CPT

## 2019-04-11 PROCEDURE — 87077 CULTURE AEROBIC IDENTIFY: CPT

## 2019-04-11 PROCEDURE — 84145 PROCALCITONIN (PCT): CPT

## 2019-04-11 PROCEDURE — 87486 CHLMYD PNEUM DNA AMP PROBE: CPT

## 2019-04-11 PROCEDURE — G0328 FECAL BLOOD SCRN IMMUNOASSAY: HCPCS

## 2019-04-11 PROCEDURE — 2580000003 HC RX 258: Performed by: INTERNAL MEDICINE

## 2019-04-11 PROCEDURE — 83605 ASSAY OF LACTIC ACID: CPT

## 2019-04-11 RX ORDER — VANCOMYCIN HYDROCHLORIDE 1 G/200ML
1000 INJECTION, SOLUTION INTRAVENOUS ONCE
Status: DISCONTINUED | OUTPATIENT
Start: 2019-04-11 | End: 2019-04-11 | Stop reason: ALTCHOICE

## 2019-04-11 RX ADMIN — DILTIAZEM HYDROCHLORIDE 30 MG: 30 TABLET, FILM COATED ORAL at 01:35

## 2019-04-11 RX ADMIN — FIDAXOMICIN 200 MG: 200 TABLET, FILM COATED ORAL at 15:14

## 2019-04-11 RX ADMIN — SODIUM CHLORIDE: 9 INJECTION, SOLUTION INTRAVENOUS at 19:24

## 2019-04-11 RX ADMIN — ASPIRIN 81 MG: 81 TABLET, COATED ORAL at 12:43

## 2019-04-11 RX ADMIN — VITAMIN D, TAB 1000IU (100/BT) 2000 UNITS: 25 TAB at 15:14

## 2019-04-11 RX ADMIN — ENOXAPARIN SODIUM 30 MG: 30 INJECTION SUBCUTANEOUS at 12:44

## 2019-04-11 RX ADMIN — Medication 500 MG: at 21:58

## 2019-04-11 RX ADMIN — Medication 1 CAPSULE: at 01:35

## 2019-04-11 RX ADMIN — FIDAXOMICIN 200 MG: 200 TABLET, FILM COATED ORAL at 21:58

## 2019-04-11 RX ADMIN — SODIUM CHLORIDE: 9 INJECTION, SOLUTION INTRAVENOUS at 01:35

## 2019-04-11 RX ADMIN — CARVEDILOL 6.25 MG: 6.25 TABLET, FILM COATED ORAL at 18:37

## 2019-04-11 RX ADMIN — DILTIAZEM HYDROCHLORIDE 30 MG: 30 TABLET, FILM COATED ORAL at 18:37

## 2019-04-11 RX ADMIN — FIDAXOMICIN 200 MG: 200 TABLET, FILM COATED ORAL at 01:35

## 2019-04-11 RX ADMIN — ISOSORBIDE MONONITRATE 30 MG: 30 TABLET, EXTENDED RELEASE ORAL at 12:43

## 2019-04-11 RX ADMIN — DILTIAZEM HYDROCHLORIDE 30 MG: 30 TABLET, FILM COATED ORAL at 12:43

## 2019-04-11 RX ADMIN — Medication 1 CAPSULE: at 21:58

## 2019-04-11 RX ADMIN — Medication 1 CAPSULE: at 12:43

## 2019-04-11 RX ADMIN — DILTIAZEM HYDROCHLORIDE 30 MG: 30 TABLET, FILM COATED ORAL at 21:58

## 2019-04-11 RX ADMIN — CARVEDILOL 6.25 MG: 6.25 TABLET, FILM COATED ORAL at 12:43

## 2019-04-11 NOTE — CARE COORDINATION
Reviewed chart and spoke with pt about discharge needs/plans. Pt is from [facility] but has been at [facility] until 3 weeks ago. She is open to returning. VM /fax to ANNIE at [facility]. CM will continue to follow. Patients white board updated and  card provided to pt/family.

## 2019-04-11 NOTE — CONSULTS
Infectious Disease Consult Note  2019   Patient Name: Kelly Alvarez : 1930   Impression   Persistent C diff colitis  o Diarrhea has persisted since 2019. Seen at previous admission on 3/819. Improved with fidaxomicin but it appears that ECF she was discharged to did not administer fidaxomicin, citing high cost.  o Uncertain if the administered vancomycin taper, which was prescribed as a substitute.  o Advanced age, would call for 6-8 week taper of vancomycin  o Stool for ova and parasites negative.  CKD IV  · Type 2 DM   Multi-morbidity: per PMHx  Plan:   Continue fidaxomicin and treat for 10 days. Either gave a fidaxomicin taper or vancomycin taper.  Strongly urge that the patient receives Elenoary Emperor at discharge   Referral to GI for possible fecal microbiota transplantation   Monitor response. Thank you for allowing me to consult in the care of this patient.  ------------------------  REASON FOR CONSULT: Infective syndrome   Requested by: Dr. Kwaku Hamilton is a 80 y.o. white female who was admitted 4/10/2019 for further evaluation and management of diarrhea and weakness. This patient was previously seen by me during her admission in 2019. At that time she said she began to have diarrhea in 2019 after she received antibiotic for a \"cold\"in 2018. She was seen in the hospital at that time and was discharged to [facility] with the plan to take vancomycin for 14 days. She was then discharged to the [facility] home and and returned home. Her diarrhea has never resolved. She was then readmitted in 2019 and received fidaxomicin. During her stay had diarrhea began to improve and then she was discharged to [facility]. After discharge to the [facility] ECF call my office stating that the fidaxomicin was too expensive. It was recommended that they give vancomycin pulsed taper for 6 weeks. The patient cannot recall this happening.   She was then discharged home and states that she has continued to have diarrhea with no resolution. Unable to state if it is bloody. She denies headache, abdominal pain or change in urinary habits. She began to have a cough when she was admitted to the hospital.  She denies any postnasal drip or sick contact. ? Infectious diseases service was consulted to evaluate the pt, and recommend further investigative and therapeutic measures. ROS: Other systems reviewed Including eyes, ENT, respiratory, cardiovascular, GI, , dermatologic, neurologic, psych, hem/lymphatic, musculoskeletal and endocrine were negative other than what is mentioned above.      Patient Active Problem List    Diagnosis Date Noted    Colitis 04/10/2019    Iron deficiency anemia 03/09/2019    Gastrointestinal hemorrhage associated with anorectal source 03/09/2019    Anemia     GIB (gastrointestinal bleeding) 03/08/2019    Sepsis ([address] 01/08/2019    Debility 01/06/2019    Uncontrolled pain 01/06/2019    CKD (chronic kidney disease) stage 4, GFR 15-29 ml/min ([facility] 01/06/2019    Gait disturbance 01/06/2019    Cellulitis of right arm 01/01/2019    Type 2 diabetes mellitus ([address] 01/01/2019    Stage 3 chronic kidney disease ([address] 01/01/2019    Anemia due to vitamin B12 deficiency 01/01/2019    Essential hypertension 01/01/2019    Angioedema 08/20/2016     Past Medical History:   Diagnosis Date    Asthma     Cancer ([address]     basal cell carcinoma, melanoma    Diabetes ([address]     Diabetes mellitus ([address]     Hyperlipidemia     Hypertension       Past Surgical History:   Procedure Laterality Date    APPENDECTOMY      COLONOSCOPY  6-24-14    severe fixed colon    EYE SURGERY      Cataracs    HYSTERECTOMY        Family History   Problem Relation Age of Onset    Diabetes Mother     Heart Disease Mother     High Blood Pressure Mother     Diabetes Father     High Blood Pressure Father     Heart Disease Father       Infectious disease related family history - not contibutory. SOCIAL HISTORY  Social History     Tobacco Use    Smoking status: Never Smoker    Smokeless tobacco: Never Used   Substance Use Topics    Alcohol use: Yes     Comment: socially only       Born:   Lived   Occupation:   No recent travel of significance.  No recent unusual exposures.  NO pets    ? ALLERGIES  Allergies   Allergen Reactions    Lisinopril Anaphylaxis      MEDICATIONS  Reviewed and are per the chart/EMR. ? Antibiotics:   Fidaxomicin  ?  -------------------------------------------------------------------------------------------------------------------    Vital Signs:  Vitals:    04/11/19 1322   BP:    Pulse:    Resp: 22   Temp:    SpO2: 95%         Exam:    VS: noted; wt 52.8 kg  Gen: alert and oriented X3, no distress  Skin: no stigmata of endocarditis  Wounds: C/D/I  HEMT: AT/NC Oropharynx pink, moist, and without lesions or exudates; dentition in good state of repair  Eyes: PERRLA, EOMI, conjunctiva pink, sclera anicteric. Neck: Supple. Trachea midline. No LAD. Chest: no distress and CTA. Good air movement. Heart: RRR and no MRG. Abd: soft, non-distended, tenderness in the right and left lower quadrants. , no hepatomegaly. Normoactive bowel sounds. Ext: no clubbing, cyanosis, or edema  Catheter Site: without erythema or tenderness  Neuro: Mental status intact. CN 2-12 intact and no focal sensory or motor deficits    ? Diagnostic Studies: reviewed  ? ? I have examined this patient and available medical records on this date and have made the above observations, conclusions and recommendations. Electronically signed by: Electronically signed by Neetu Rasmussen MD on 4/11/2019 at 1:44 PM    This dictation was created with voice recognition software.  While attempts have been made to review the dictation as it is transcribed, on occasion the spoken word can be misinterpreted by the technology leading to omissions or inappropriate words, phrases or sentences.     Electronically signed by: Electronically signed by Neetu Rasmussen MD on 4/11/2019 at 5:56 PM, 4/11/2019 5:56 PM

## 2019-04-11 NOTE — PROGRESS NOTES
.Progress Note (Hospitalist, Internal Medicine)  IDENTIFYING INFORMATION   PATIENT:  Ofelia Tse  MRN:  [id number]  ADMIT DATE: 4/10/2019  TIME OF EVALUATION: 4/11/2019 7:13 PM      HISTORY OF PRESENT ILLNESS   Ofelia Tse is a 80 y.o. female who presents with diarrhea. 80-year-old female presenting to the ED with diarrhea which had been ongoing for about 10 days. Patient found to have C. Diff on presentation for which she was admitted and started on by mouth vancomycin. SUBJECTIVE     Patient still having diarrhea. She denies fever, chills, nausea or vomiting. She denies abdominal pain. MEDICATIONS   Medications Prior to Admission  Medications Prior to Admission: lactobacillus (CULTURELLE) capsule, Take 1 capsule by mouth 3 times daily  aspirin 81 MG EC tablet, Take 1 tablet by mouth daily  butalbital-acetaminophen-caffeine (FIORICET, ESGIC) -40 MG per tablet, Take 1 tablet by mouth every 4 hours as needed for Headaches  Fidaxomicin (DIFICID) 200 MG TABS tablet, 200mg BID for 8 days before tapering to 100mg QD for 7 days.  Follow up ID for further adjustment  tiZANidine (ZANAFLEX) 2 MG tablet, Take 1 tablet by mouth every 4 hours as needed (neck spasms)  Cholecalciferol (VITAMIN D3) 5000 units TABS, Take by mouth daily  acetaminophen (TYLENOL) 325 MG tablet, Take 325 mg by mouth every 4 hours as needed for Pain (As needed)  isosorbide mononitrate (IMDUR) 30 MG extended release tablet, Take 1 tablet by mouth daily  carvedilol (COREG) 6.25 MG tablet, Take 1 tablet by mouth 2 times daily (with meals)  diltiazem (CARDIZEM) 30 MG tablet, Take 1 tablet by mouth 4 times daily    Current Medications  Current Facility-Administered Medications   Medication Dose Route Frequency Provider Last Rate Last Dose    vancomycin (VANCOCIN) oral solution 500 mg  500 mg Oral 3 times per day Pattie Perry MD        dicyclomine (BENTYL) injection 20 mg  20 mg Intramuscular Once Jj Green MD        aspirin EC tablet 81 mg  81 mg Oral Daily Jazmine Grant MD   81 mg at 04/11/19 1243    acetaminophen (TYLENOL) tablet 325 mg  325 mg Oral Q4H PRN Jazmine Grant MD        carvedilol (COREG) tablet 6.25 mg  6.25 mg Oral BID WC Jazmine Grant MD   6.25 mg at 04/11/19 1837    vitamin D (CHOLECALCIFEROL) tablet 2,000 Units  2,000 Units Oral Daily Jazmine Grant MD   2,000 Units at 04/11/19 1514    diltiazem (CARDIZEM) tablet 30 mg  30 mg Oral 4x Daily Jazmine Grant MD   30 mg at 04/11/19 1837    Fidaxomicin (DIFICID) tablet 200 mg  200 mg Oral BID Jazmine Grant MD   200 mg at 04/11/19 1514    isosorbide mononitrate (IMDUR) extended release tablet 30 mg  30 mg Oral Daily Jazmine Grant MD   30 mg at 04/11/19 1243    lactobacillus (CULTURELLE) capsule 1 capsule  1 capsule Oral TID Jazmine Grant MD   1 capsule at 04/11/19 1243    0.9 % sodium chloride infusion   Intravenous Continuous Jazmine Grant  mL/hr at 04/11/19 0135      prochlorperazine (COMPAZINE) injection 10 mg  10 mg Intravenous Q6H PRN Jazmine Grant MD        ondansetron [facility]) injection 4 mg  4 mg Intravenous Q6H PRN Jazmine Grant MD        enoxaparin (LOVENOX) injection 30 mg  30 mg Subcutaneous Daily Jazmine Grant MD   30 mg at 04/11/19 1244    hydrALAZINE (APRESOLINE) 10 mg in sodium chloride 0.9 % 50 mL ivpb  10 mg Intravenous Q4H PRN Jazmine Grant MD             Allergies  Allergies   Allergen Reactions    Lisinopril Anaphylaxis       REVIEW OF SYSTEMS     Within above limitations. 14 point review of systems reviewed. Pertinent positive or negative as per HPI or otherwise negative per 14 point systems review. Reviewed 4/11/2019 at 7:13 PM    PHYSICAL EXAM       Blood pressure (!) 116/59, pulse 82, temperature 98.5 °F (36.9 °C), temperature source Oral, resp. rate 22, height 5' 5\" (1.651 m), weight 116 lb 8 oz (52.8 kg), SpO2 97 %, not currently breastfeeding. General - AAO x 3  Psych - Appropriate affect/speech.  No agitation  Eyes - Eye lids intact. No scleral icterus  ENT - Lips wnl. External ear clear/dry/intact. No thyromegaly on inspection  Neuro - No gross peripheral or central neuro deficits on inspection  Heart - Sinus. RRR. S1 and S2 present. No added HS/murmurs appreciated. No elevated JVD appreciated  Lung - Adequate air entry b/l, No crackles/wheezes appreciated  GI -  Soft. No guarding/rigidity. No hepatosplenomegaly/ascites. BS+   - No CVA/suprapubic tenderness or palpable bladder distension  Skin - Intact. No rash/petechiae/ecchymosis. Warm extremities  MSK - Joints with normal ROM. No joint swellings      Lines/Drains/Airways/Wounds:  [unfilled]    LABS AND IMAGING   CBC  [unfilled]    Last 3 Hemoglobin  Lab Results   Component Value Date    HGB 8.0 04/11/2019    HGB 8.0 04/10/2019    HGB 9.3 03/13/2019     Last 3 WBC/ANC  Lab Results   Component Value Date    WBC 7.4 04/11/2019    WBC 10.1 04/10/2019    WBC 6.3 03/13/2019     No components found for: GRNLOCTYABS  Last 3 Platelets  No results found for: PLATELET  Chemistry  [unfilled]  [unfilled]  Lab Results   Component Value Date     03/07/2019     Coagulation Studies  Lab Results   Component Value Date    INR 1.32 03/10/2019     Liver Function Studies  Lab Results   Component Value Date    ALT 10 04/10/2019    AST 18 04/10/2019    ALKPHOS 50 04/10/2019       Recent Imaging        Relevant labs and imaging reviewed    ASSESSMENT AND PLAN     C. Diff colitis  Patient started on empiric deficit as well as probiotic    A. Fib  Patient is rate controlled   Hold off anticoagulation for now.         [facility], Internal Medicine  4/11/2019 at 7:13 PM

## 2019-04-11 NOTE — DISCHARGE INSTR - COC
Continuity of Care Form    Patient Name: Ana Osuna   :  1930  MRN:  [id number]    Admit date:  4/10/2019  Discharge date:  2019    Code Status Order: Full Code   Advance Directives:     Admitting Physician:  Paola Ramirez MD  PCP: Edilia Almazan MD    Discharging Nurse: Ainsley Polanco Unit/Room#: 9880/7249-N  Discharging Unit Phone Number: [phone]    Emergency Contact:   Extended Emergency Contact Information  Primary Emergency Contact: Rand Quiñones [address] Phone: [phone]  Mobile Phone: [phone]  Relation: Child  Secondary Emergency Contact: Jenna Forbes           [place], [facility]  Mobile Phone: [phone]  Relation: Child    Past Surgical History:  Past Surgical History:   Procedure Laterality Date    APPENDECTOMY      COLONOSCOPY  624-14    severe fixed colon    EYE SURGERY      Cataracs    HYSTERECTOMY         Immunization History:   Immunization History   Administered Date(s) Administered    Influenza Virus Vaccine 10/08/2014       Active Problems:  Patient Active Problem List   Diagnosis Code    Angioedema T78. 3XXA    Cellulitis of right arm L03. 113    Type 2 diabetes mellitus (HCC) E11.9    Stage 3 chronic kidney disease (HCC) N18.3    Anemia due to vitamin B12 deficiency D51.9    Essential hypertension I10    Debility R53.81    Uncontrolled pain R52    CKD (chronic kidney disease) stage 4, GFR 15-29 ml/min (HCC) N18.4    Gait disturbance R26.9    Sepsis (HCC) A41.9    GIB (gastrointestinal bleeding) K92.2    Iron deficiency anemia D50.9    Gastrointestinal hemorrhage associated with anorectal source K62.5    Anemia D64.9    Colitis K52.9       Isolation/Infection:   Isolation          C Diff Contact            Nurse Assessment:  Last Vital Signs: BP (!) 109/59   Pulse 85   Temp 97.5 °F (36.4 °C) (Oral)   Resp 20   Ht 5' 5\" (1.651 m)   Wt 116 lb 8 oz (52.8 kg)   SpO2 96% Vancomycin 500 mg QID, will taper as outpatient by Dr Trista Yee)  Wyatt Sit as outpatient, will initiate PA  Continue Probiotic daily           Physician Certification: I certify the above information and transfer of Mitchell Velazquez  is necessary for the continuing treatment of the diagnosis listed and that she requires [facility] for less 30 days.      Update Admission H&P: No change in H&P    PHYSICIAN SIGNATURE:  Electronically signed by Karma Oconnor MD on 4/17/19 at 11:59 AM

## 2019-04-11 NOTE — H&P
HISTORY AND PHYSICAL  (Hospitalist, Internal Medicine)  IDENTIFYING INFORMATION   PATIENT:  Rachael Haque  MRN:  [id number]  ADMIT DATE: 4/10/2019  TIME OF EVALUATION: 4/10/2019 10:49 PM    CHIEF COMPLAINT   Slushy diarrhea stool    HISTORY OF PRESENT ILLNESS   Rachael Haque is a 80 y.o. female with a past medical history probably undiagnosed dementia, AFib, recent hx of recurrent C.diff colitis c/b sepsis who did not remember completing course of dificid since last discharged 3/2019 presents with 8-10 days hx of progressive soft diarrhea liked stool with urge incontinence. Symptoms have not improved in the past few days leading to ED visit. She described consistency as \"slushy\" and would have BM up to 6-7 x daily. She denies any over abdo discomfort or cramps - her abdo appears benigh    Her PMH listed below:    PAST MEDICAL, SURGICAL, FAMILY, and SOCIAL HISTORY     Past Medical History:   Diagnosis Date    Asthma     Cancer ([facility]     basal cell carcinoma, melanoma    Diabetes ([facility] [facility]     Diabetes mellitus ([facility]     Hyperlipidemia     Hypertension      Past Surgical History:   Procedure Laterality Date    APPENDECTOMY      COLONOSCOPY  6-24-14    severe fixed colon    EYE SURGERY      Cataracs    HYSTERECTOMY       Family History   Problem Relation Age of Onset    Diabetes Mother     Heart Disease Mother     High Blood Pressure Mother     Diabetes Father     High Blood Pressure Father     Heart Disease Father      Family Hx of HTN  Family Hx as reviewed above, otherwise non-contributory  Social History     Socioeconomic History    Marital status:       Spouse name: None    Number of children: 3    Years of education: None    Highest education level: None   Occupational History    None   Social Needs    Financial resource strain: None    Food insecurity:     Worry: None     Inability: None    Transportation needs:     Medical: None     Non-medical: None   Tobacco Use    Smoking status: Never Smoker    Smokeless tobacco: Never Used   Substance and Sexual Activity    Alcohol use: Yes     Comment: socially only    Drug use: No    Sexual activity: None   Lifestyle    Physical activity:     Days per week: None     Minutes per session: None    Stress: None   Relationships    Social connections:     Talks on phone: None     Gets together: None     Attends Druze service: None     Active member of club or organization: None     Attends meetings of clubs or organizations: None     Relationship status: None    Intimate partner violence:     Fear of current or ex partner: None     Emotionally abused: None     Physically abused: None     Forced sexual activity: None   Other Topics Concern    None   Social History Narrative    None       MEDICATIONS   Medications Prior to Admission  Not in a hospital admission. Current Medications  Current Facility-Administered Medications   Medication Dose Route Frequency Provider Last Rate Last Dose    dicyclomine (BENTYL) injection 20 mg  20 mg Intramuscular Once Elizabeth Drew PA-C        ondansetron [facility]) injection 4 mg  4 mg Intravenous PRN Elizabeth Drew PA-C         Current Outpatient Medications   Medication Sig Dispense Refill    lactobacillus (CULTURELLE) capsule Take 1 capsule by mouth 3 times daily 90 capsule 1    aspirin 81 MG EC tablet Take 1 tablet by mouth daily 30 tablet 0    butalbital-acetaminophen-caffeine (FIORICET, ESGIC) -40 MG per tablet Take 1 tablet by mouth every 4 hours as needed for Headaches 180 tablet 3    Fidaxomicin (DIFICID) 200 MG TABS tablet 200mg BID for 8 days before tapering to 100mg QD for 7 days.  Follow up ID for further adjustment 23 tablet 0    tiZANidine (ZANAFLEX) 2 MG tablet Take 1 tablet by mouth every 4 hours as needed (neck spasms) 30 tablet 0    Cholecalciferol (VITAMIN D3) 5000 units TABS Take by mouth daily      acetaminophen (TYLENOL) 325 MG tablet Take 325 mg by mouth every 4 hours as needed for Pain (As needed)      isosorbide mononitrate (IMDUR) 30 MG extended release tablet Take 1 tablet by mouth daily 30 tablet 0    carvedilol (COREG) 6.25 MG tablet Take 1 tablet by mouth 2 times daily (with meals) 60 tablet 0    diltiazem (CARDIZEM) 30 MG tablet Take 1 tablet by mouth 4 times daily 120 tablet 0         Allergies  Allergies   Allergen Reactions    Lisinopril Anaphylaxis       REVIEW OF SYSTEMS   Within above limitations. 14 point review of systems reviewed. Pertinent positive or negative as per HPI or otherwise negative per 14 point systems review. PHYSICAL EXAM     Wt Readings from Last 3 Encounters:   04/10/19 127 lb (57.6 kg)   03/13/19 128 lb 3.2 oz (58.2 kg)   01/21/19 136 lb 12.8 oz (62.1 kg)       Blood pressure (!) 132/56, pulse 67, temperature 100 °F (37.8 °C), temperature source Oral, resp. rate 18, height 5' (1.524 m), weight 127 lb (57.6 kg), SpO2 97 %, not currently breastfeeding. General - AAO x 3  Psych - Appropriate affect/speech. No agitation  Eyes - Eye lids intact. No scleral icterus  ENT - Lips wnl. External ear clear/dry/intact. No thyromegaly on inspection  Neuro - No gross peripheral or central neuro deficits on inspection  Heart - Irregular. S1 and S2 present. No elevated JVD appreciated  Lung - Adequate air entry b/l, No crackles/wheezes appreciated  GI - Soft. No guarding/rigidity. No hepatosplenomegaly/ascites. BS+   - No CVA/suprapubic tenderness or palpable bladder distension  Skin - Intact. No rash/petechiae/ecchymosis.  Warm extremities      LABS AND IMAGING   CBC  [unfilled]    Last 3 Hemoglobin  Lab Results   Component Value Date    HGB 8.0 04/10/2019    HGB 9.3 03/13/2019    HGB 8.5 03/12/2019     Last 3 WBC/ANC  Lab Results   Component Value Date    WBC 10.1 04/10/2019    WBC 6.3 03/13/2019    WBC 8.3 03/12/2019     No components found for: GRNLOCTYABS  Last 3 Platelets  No results found for: PLATELET  Chemistry  [unfilled]  [unfilled]  Lab Results   Component Value Date     03/07/2019     Coagulation Studies  Lab Results   Component Value Date    INR 1.32 03/10/2019     Liver Function Studies  Lab Results   Component Value Date    ALT 10 04/10/2019    AST 18 04/10/2019    ALKPHOS 50 04/10/2019       Recent Imaging    Yadira Weinberg #[id number] ([id number]) (80 y.o. F) (Adm: 04/10/19)    [id number]         Imaging Results (last 7 days)          Procedure Component Value Ref Range Date/Time     CT ABDOMEN PELVIS WO CONTRAST Additional Contrast? None [[id number]] Collected: 04/10/19 2055     Order Status: Completed Updated: 04/10/19 2117     Narrative:       EXAMINATION:  CT OF THE ABDOMEN AND PELVIS WITHOUT CONTRAST 4/10/2019 8:38 pm    TECHNIQUE:  CT of the abdomen and pelvis was performed without the administration of  intravenous contrast. Multiplanar reformatted images are provided for review. Dose modulation, iterative reconstruction, and/or weight based adjustment of  the mA/kV was utilized to reduce the radiation dose to as low as reasonably  achievable. COMPARISON:  September 29, 2017    HISTORY:  ORDERING SYSTEM PROVIDED HISTORY: abd pain, diarrhea  TECHNOLOGIST PROVIDED HISTORY:  Additional Contrast?->None  Ordering Physician Provided Reason for Exam: abd pain, diarrhea  Acuity: Acute  Type of Exam: Initial  Additional signs and symptoms: surg. hx; appendectomy. hysterectomy  Relevant Medical/Surgical History: none    FINDINGS:  Evaluation is limited by motion. Lower Chest: No pleural effusions. Organs: Fluid density lesion within the right hepatic lobe is consistent with  a cyst.  No gallstones.  No pancreatic lesion.  Punctate calcifications  within the spleen are likely related to prior granulomatous disease.  No  adrenal lesion.  No hydronephrosis.  Punctate nonobstructing renal calculi.   Subtle renal hyperdensities measuring 1-2 mm may represent a complex cyst but  appear similar to the previous exam.    GI/Bowel: No bowel obstruction.  Sigmoid diverticulosis with focal wall  thickening and surrounding inflammatory changes best seen on coronal imaging. Prominent adjacent lymph nodes. Pelvis: No free fluid.  No bladder calculus. Peritoneum/Retroperitoneum: Atherosclerotic calcification of the abdominal  aorta.  No retroperitoneal adenopathy. Bones/Soft Tissues: No acute soft tissue abnormality. Diffuse osteopenia. Lumbar spine degenerative changes.     Impression:       Acute sigmoid diverticulitis.     Multiple prominent nonspecific lymph nodes surround the sigmoid colon which  could be reactive.  Follow-up is recommended to exclude underlying malignancy.     CT ABDOMEN PELVIS W IV CONTRAST [[id number]]      Order Status: Canceled              Relevant labs and imaging reviewed    ASSESSMENT AND PLAN     Acute sigmoid diverticulitis vs. C.diff associated localized colitis given hx in the past month and prior  Adjacent inflammatory adenopathy  - known to ID, GI  - given clinical suspicion, will restart empiric dificid, probiotics overnight that was rec last admission (per clinic note on Epic, cost may have prevented patient from completing full course - patient does not remember as she probably has dementia)  - given non-toxic, belly benign and will hold IV antibiotics for now pending consultants eval  - check stool studies, C.diff  - trend labs  - IVF  - supportive care and follow clinically    AFib  - follows with Dr Asad Bernal  - continue rate agent  - previously on xarelto but this as held due to prior GIB  - now on 81 ASA    CKD    Lovenox ppx    Case d/w ED physician    [facility], Internal Medicine  4/10/2019 at 10:49 PM

## 2019-04-11 NOTE — CONSULTS
[facility]  Gastroenterology Consultation    2019  11:33 AM    Patient:    Corinna Treviño  : 1930   80 y.o. MRN: [id number]  Admitted: 4/10/2019  6:12 PM ATT: Heber Naqvi MD   [id number]  AdmitDx: Colitis [K52.9]  Sigmoid diverticulitis [K57.32]  Lower abdominal pain [R10.30]  PCP: Nella Lyles MD    Reason for Consult:  Colitis hx of c diff    Requesting Physician:  Heber Naqvi MD      History Obtained From:  Patient and review of all records    HISTORY OF PRESENT ILLNESS:                The patient is a 80 y.o. female with significant past medical history as below who presents with above mentioned causes in reason for consult. Hx of c diff. Has had loose stools at home for 3 days. Hx of incontinence of stool since 1st diagnosed with c diff 19. Stools became looser in last 24 hrs. Unsure of any new antibiotics. Denies abd pain, or NV.     Consult initiated for     Denies Abdominal pain  Denies N/V, GERD, dyspepsia, dysphagia   Last BM this am, watery brown    History of colonoscopy   incomplete d/t severe fixation of colon      Past Medical History:        Diagnosis Date    Asthma     Cancer ([facility] [address]     basal cell carcinoma, melanoma    Diabetes ([facility] [address]     Diabetes mellitus ([facility] [address]     Hyperlipidemia     Hypertension        Past Surgical History:        Procedure Laterality Date    APPENDECTOMY      COLONOSCOPY  14    severe fixed colon    EYE SURGERY      Cataracs    HYSTERECTOMY           Current Medications:    Medications    Scheduled Medications:    dicyclomine  20 mg Intramuscular Once    aspirin  81 mg Oral Daily    carvedilol  6.25 mg Oral BID WC    vitamin D  2,000 Units Oral Daily    diltiazem  30 mg Oral 4x Daily    Fidaxomicin  200 mg Oral BID    isosorbide mononitrate  30 mg Oral Daily    lactobacillus  1 capsule Oral TID    enoxaparin  30 mg Subcutaneous Daily     PRN Medications: acetaminophen, prochlorperazine, ondansetron, hydrALAZINE (APRESOLINE) ivpb      Allergies:  Lisinopril    Social History:   TOBACCO:   reports that she has never smoked. She has never used smokeless tobacco.  ETOH:   reports that she drinks alcohol. Family History:       Problem Relation Age of Onset    Diabetes Mother     Heart Disease Mother     High Blood Pressure Mother     Diabetes Father     High Blood Pressure Father     Heart Disease Father        No family history of colon cancer, Crohn's disease, or ulcerative colitis.     REVIEW OF SYSTEMS:    The positive ROS will be identified in bold, otherwise ROS are negative     CONSTITUTIONAL:  Neg   Recent weight changes, fatigue, fever, chills or night sweats  RESPIRATORY:   Neg SOB, wheeze, cough, sputum, hemoptysis or bronchitis  CARDIOVASCULAR:  Neg chest pain, palpitations, dyspnea on exertion, orthopnea, paroxysmal nocturnal dyspnea or edema  GASTROINTESTINAL:  SEE HPI  HEMATOLOGIC/LYMPHATIC:  Neg  Anemia, bleeding tendency      PHYSICAL EXAM:      Vitals:    BP (!) 147/57   Pulse 70   Temp 97.8 °F (36.6 °C) (Oral)   Resp 19   Ht 5' 5\" (1.651 m)   Wt 116 lb 8 oz (52.8 kg)   SpO2 97%   BMI 19.39 kg/m²     General Appearance:    Alert, cooperative, no distress, appears stated age   HEENT:    Normocephalic, atraumatic, Conjunctiva pallor   Neck:   Supple, symmetrical, trachea midline   Lungs:     Clear to auscultation bilaterally, respirations unlabored   Chest Wall:    No tenderness or deformity    Heart:    Regular rate and rhythm, S1 and S2 normal   Abdomen:     Soft, non-tender, bowel sounds active all four quadrants,     no masses, no organomegaly, no ascites    Rectal:    Deferred   Extremities:   Extremities normal, atraumatic, no cyanosis or edema   Pulses:   2+ and symmetric all extremities   Skin:   Skin color, texture, turgor normal, no rashes or lesions   Lymph nodes:   No abnormality   Neurologic:   No focal deficits, moving all four extremities            DATA:

## 2019-04-11 NOTE — ED NOTES
Ambulated to the restroom,gait steady,1 small loose stool     Driss Tohmas RN  04/10/19 2131       Driss Thomas RN  04/10/19 2133

## 2019-04-11 NOTE — PROGRESS NOTES
Skin assessment complete with Samia Porter RN. Pt noted to have raw bottom from loose stools. Barrier cream applied. No other skin issues noted at this time.

## 2019-04-11 NOTE — CARE COORDINATION
CM - pt at risk for a readmission --room 26. Pt current complaint is \"Abdominal Pain \" . Pt had a recent admission from 3 8---thru-----3/13  For HA , GI bleed, Anemia, -pt is currently undergoing treatment for Costarum Difficile . Pt is currently a resident and being cared for at the [facility].   Pt admitted for colitis- CM to follow

## 2019-04-12 LAB
ANION GAP SERPL CALCULATED.3IONS-SCNC: 12 MMOL/L (ref 4–16)
BASOPHILS ABSOLUTE: 0 K/CU MM
BASOPHILS RELATIVE PERCENT: 0.4 % (ref 0–1)
BUN BLDV-MCNC: 15 MG/DL (ref 6–23)
CALCIUM SERPL-MCNC: 8 MG/DL (ref 8.3–10.6)
CHLORIDE BLD-SCNC: 104 MMOL/L (ref 99–110)
CO2: 21 MMOL/L (ref 21–32)
CREAT SERPL-MCNC: 1.5 MG/DL (ref 0.6–1.1)
DIFFERENTIAL TYPE: ABNORMAL
EOSINOPHILS ABSOLUTE: 0.2 K/CU MM
EOSINOPHILS RELATIVE PERCENT: 2.1 % (ref 0–3)
FERRITIN: 1164 NG/ML (ref 15–150)
GFR AFRICAN AMERICAN: 40 ML/MIN/1.73M2
GFR NON-AFRICAN AMERICAN: 33 ML/MIN/1.73M2
GLUCOSE BLD-MCNC: 101 MG/DL (ref 70–99)
HCT VFR BLD CALC: 23.7 % (ref 37–47)
HEMOGLOBIN: 7.3 GM/DL (ref 12.5–16)
IMMATURE NEUTROPHIL %: 0.3 % (ref 0–0.43)
IRON: 20 UG/DL (ref 37–145)
LACTATE: 0.5 MMOL/L (ref 0.4–2)
LYMPHOCYTES ABSOLUTE: 1.7 K/CU MM
LYMPHOCYTES RELATIVE PERCENT: 21.5 % (ref 24–44)
MAGNESIUM: 1.6 MG/DL (ref 1.8–2.4)
MCH RBC QN AUTO: 29.6 PG (ref 27–31)
MCHC RBC AUTO-ENTMCNC: 30.8 % (ref 32–36)
MCV RBC AUTO: 96 FL (ref 78–100)
MONOCYTES ABSOLUTE: 0.6 K/CU MM
MONOCYTES RELATIVE PERCENT: 8 % (ref 0–4)
NUCLEATED RBC %: 0 %
PCT TRANSFERRIN: 12 % (ref 10–44)
PDW BLD-RTO: 14.6 % (ref 11.7–14.9)
PLATELET # BLD: 208 K/CU MM (ref 140–440)
PMV BLD AUTO: 8.9 FL (ref 7.5–11.1)
POTASSIUM SERPL-SCNC: 3.6 MMOL/L (ref 3.5–5.1)
PROCALCITONIN: 0.15
RBC # BLD: 2.47 M/CU MM (ref 4.2–5.4)
SEGMENTED NEUTROPHILS ABSOLUTE COUNT: 5.3 K/CU MM
SEGMENTED NEUTROPHILS RELATIVE PERCENT: 67.7 % (ref 36–66)
SODIUM BLD-SCNC: 137 MMOL/L (ref 135–145)
TOTAL IMMATURE NEUTOROPHIL: 0.02 K/CU MM
TOTAL IRON BINDING CAPACITY: 168 UG/DL (ref 250–450)
TOTAL NUCLEATED RBC: 0 K/CU MM
UNSATURATED IRON BINDING CAPACITY: 148 UG/DL (ref 110–370)
WBC # BLD: 7.8 K/CU MM (ref 4–10.5)

## 2019-04-12 PROCEDURE — 2580000003 HC RX 258: Performed by: INTERNAL MEDICINE

## 2019-04-12 PROCEDURE — 1200000000 HC SEMI PRIVATE

## 2019-04-12 PROCEDURE — 94761 N-INVAS EAR/PLS OXIMETRY MLT: CPT

## 2019-04-12 PROCEDURE — 6370000000 HC RX 637 (ALT 250 FOR IP): Performed by: INTERNAL MEDICINE

## 2019-04-12 PROCEDURE — 85025 COMPLETE CBC W/AUTO DIFF WBC: CPT

## 2019-04-12 PROCEDURE — 99231 SBSQ HOSP IP/OBS SF/LOW 25: CPT | Performed by: INTERNAL MEDICINE

## 2019-04-12 PROCEDURE — 83735 ASSAY OF MAGNESIUM: CPT

## 2019-04-12 PROCEDURE — 6360000002 HC RX W HCPCS: Performed by: INTERNAL MEDICINE

## 2019-04-12 PROCEDURE — 83550 IRON BINDING TEST: CPT

## 2019-04-12 PROCEDURE — 82728 ASSAY OF FERRITIN: CPT

## 2019-04-12 PROCEDURE — 84145 PROCALCITONIN (PCT): CPT

## 2019-04-12 PROCEDURE — 80048 BASIC METABOLIC PNL TOTAL CA: CPT

## 2019-04-12 PROCEDURE — 83605 ASSAY OF LACTIC ACID: CPT

## 2019-04-12 PROCEDURE — 83540 ASSAY OF IRON: CPT

## 2019-04-12 PROCEDURE — 36415 COLL VENOUS BLD VENIPUNCTURE: CPT

## 2019-04-12 RX ORDER — MAGNESIUM SULFATE IN WATER 40 MG/ML
2 INJECTION, SOLUTION INTRAVENOUS ONCE
Status: COMPLETED | OUTPATIENT
Start: 2019-04-12 | End: 2019-04-12

## 2019-04-12 RX ADMIN — Medication 1 CAPSULE: at 09:24

## 2019-04-12 RX ADMIN — ENOXAPARIN SODIUM 30 MG: 30 INJECTION SUBCUTANEOUS at 09:24

## 2019-04-12 RX ADMIN — SODIUM CHLORIDE: 9 INJECTION, SOLUTION INTRAVENOUS at 07:17

## 2019-04-12 RX ADMIN — Medication 500 MG: at 05:46

## 2019-04-12 RX ADMIN — Medication 1 CAPSULE: at 15:36

## 2019-04-12 RX ADMIN — FIDAXOMICIN 200 MG: 200 TABLET, FILM COATED ORAL at 09:24

## 2019-04-12 RX ADMIN — Medication 1 CAPSULE: at 23:56

## 2019-04-12 RX ADMIN — MAGNESIUM SULFATE HEPTAHYDRATE 2 G: 40 INJECTION, SOLUTION INTRAVENOUS at 09:23

## 2019-04-12 RX ADMIN — DILTIAZEM HYDROCHLORIDE 30 MG: 30 TABLET, FILM COATED ORAL at 18:37

## 2019-04-12 RX ADMIN — FIDAXOMICIN 200 MG: 200 TABLET, FILM COATED ORAL at 23:31

## 2019-04-12 RX ADMIN — ISOSORBIDE MONONITRATE 30 MG: 30 TABLET, EXTENDED RELEASE ORAL at 09:24

## 2019-04-12 RX ADMIN — ASPIRIN 81 MG: 81 TABLET, COATED ORAL at 09:24

## 2019-04-12 RX ADMIN — DILTIAZEM HYDROCHLORIDE 30 MG: 30 TABLET, FILM COATED ORAL at 23:56

## 2019-04-12 RX ADMIN — SODIUM CHLORIDE: 9 INJECTION, SOLUTION INTRAVENOUS at 23:56

## 2019-04-12 RX ADMIN — VITAMIN D, TAB 1000IU (100/BT) 2000 UNITS: 25 TAB at 09:23

## 2019-04-12 RX ADMIN — Medication 500 MG: at 23:57

## 2019-04-12 NOTE — CARE COORDINATION
Spoke with Edson Gonsalez at [facility] they will have a bed for this pt. CM will continue to follow.

## 2019-04-12 NOTE — PROGRESS NOTES
[facility]        Progress Note       2019  10:47 AM    Patient:    Carmelo Gonzalez  : 1930   80 y.o. MRN: [id number]  Admitted: 4/10/2019  6:12 PM ATT: Joseph Calabrese MD   [id number]  AdmitDx: Colitis [K52.9]  Sigmoid diverticulitis [K57.32]  Lower abdominal pain [R10.30]  PCP: Bianca Brooke MD    SUBJECTIVE:  Chart reviewed, events noted  Patient feeling better. No BM today. 3-4 liquid/soft yesterday. Tolerating clear liquid diet. Requesting to advance diet. Denies N/V. Denies abdominal pain.     ROS:  The positive ROS will be identified in bold     CONSTITUTIONAL:  Neg  Weight loss, fatigue, fever  MOUTH/THROAT:  Neg  Bleeding gums, hoarseness or sore throat  RESPIRATORY:   Neg SOB, wheeze, cough, hemoptysis or bronchitis  CARDIOVASCULAR:  Neg Chest pain, palpitations, dyspnea on exertion, edema  GASTROINTESTINAL:  SEE HPI  HEMATOLOGIC/LYMPHATIC:  Neg  Anemia, bleeding tendency  MUSCULOSKELETAL: Neg  New myalgias, joint pain, swelling or stiffness  NEUROLOGICAL:  Neg  Loss of Consciousness, memory loss, forgetfulness, periods of confusion, difficulty concentrating, seizures, insomnia, aphasia    SKIN:  Neg No itching, rashes, or sores  PSYCHIATRIC:  Neg Depression, personality changes, anxiety    OBJECTIVE:      /72   Pulse 73   Temp 97.2 °F (36.2 °C) (Oral)   Resp 18   Ht 5' 5\" (1.651 m)   Wt 116 lb 8 oz (52.8 kg)   SpO2 94%   BMI 19.39 kg/m²     NAD, appears comfortable, sitting up in bed  Lips and mucous membranes pink and moist  RRR, Nl s1s2   Lungs CTA bilaterally, respirations even and unlabored   Abdomen soft, mild distention, mild diffuse tenderness, bowel sounds normal  Skin pink, warm and dry  No edema bilateral lower extremities   AAOx3      CBC:   Recent Labs     04/10/19  1837 04/11/19  0428 04/12/19  0129   WBC 10.1 7.4 7.8   HGB 8.0* 8.0* 7.3*    222 208     BMP:    Recent Labs     04/10/19  1837 04/11/19  0428 04/12/19  0129   NA 130* 134* 137   K 4.2 3.4* 3.6   CL 96* 101 104   CO2 21 23 21   BUN 20 19 15   CREATININE 1.6* 1.7* 1.5*   GLUCOSE 116* 103* 101*     Magnesium:   Lab Results   Component Value Date    MG 1.6 04/12/2019     Hepatic:   Recent Labs     04/10/19  1837   AST 18   ALT 10   BILITOT 0.4   ALKPHOS 50     INR: No results for input(s): INR in the last 72 hours. Intake/Output Summary (Last 24 hours) at 4/12/2019 1047  Last data filed at 4/12/2019 0940  Gross per 24 hour   Intake 4055 ml   Output 850 ml   Net 3205 ml         Medications    Scheduled Medications:    magnesium sulfate  2 g Intravenous Once    vancomycin  500 mg Oral 3 times per day    dicyclomine  20 mg Intramuscular Once    aspirin  81 mg Oral Daily    carvedilol  6.25 mg Oral BID WC    vitamin D  2,000 Units Oral Daily    diltiazem  30 mg Oral 4x Daily    Fidaxomicin  200 mg Oral BID    isosorbide mononitrate  30 mg Oral Daily    lactobacillus  1 capsule Oral TID    enoxaparin  30 mg Subcutaneous Daily     PRN Medications: acetaminophen, prochlorperazine, ondansetron, hydrALAZINE (APRESOLINE) ivpb  Infusions:    sodium chloride 100 mL/hr at 04/12/19 8878       Patient Active Problem List   Diagnosis Code    Angioedema T78. 3XXA    Cellulitis of right arm L03. 113    Type 2 diabetes mellitus (HCC) E11.9    Stage 3 chronic kidney disease (HCC) N18.3    Anemia due to vitamin B12 deficiency D51.9    Essential hypertension I10    Debility R53.81    Uncontrolled pain R52    CKD (chronic kidney disease) stage 4, GFR 15-29 ml/min (HCC) N18.4    Gait disturbance R26.9    Sepsis (HCC) A41.9    GIB (gastrointestinal bleeding) K92.2    Iron deficiency anemia D50.9    Gastrointestinal hemorrhage associated with anorectal source K62.5    Anemia D64.9    Colitis K52.9     Assessment:    Hypomagnesium, received replacement today     C-diff colitis:  Recurrent, stool consistency improving   Patient reports 3-4 liquid/soft BMs yesterday.  No BM today as of 1100  Stool culture pending     RECOMMENDATIONS:  Recommend soft diet   Continue Vancomycin 500 mg PO TID   Continue Dificid 200 mg BID   Continue IVF  CBC, BMP daily   ID on case     Discussed plan of care with patient      Patient clinical, biochemical, and radiological information discussed with Dr. Fernando Ramos. He agrees with the assessment and plan. Ra Underwood CNP  4/12/2019  10:47 AM     I have seen and examined this patient personally, and independently of the nurse practitioner. The plan was developed mutually at the time of the visit with the patient. Marcelo Cuevas and myself have spoken with patient, nursing staff and provided written and verbal instructions .     The above note has been reviewed and I agree with the Assessment,  Diagnosis, and Treatment plan as suggested by Marcelo Cuevas CNP      [address] [facility] gastroenterology

## 2019-04-12 NOTE — PLAN OF CARE
Problem: Falls - Risk of:  Goal: Will remain free from falls  Description  Will remain free from falls  4/12/2019 0305 by Joey Valentin RN  Outcome: Met This Shift  4/12/2019 0305 by Joey Valentin RN  Outcome: Met This Shift  Goal: Absence of physical injury  Description  Absence of physical injury  4/12/2019 0305 by Joey Valentin RN  Outcome: Met This Shift  4/12/2019 0305 by Joey Valentin RN  Outcome: Met This Shift

## 2019-04-12 NOTE — PROGRESS NOTES
3.5 - 5.1 MMOL/L    Chloride 104 99 - 110 mMol/L    CO2 21 21 - 32 MMOL/L    Anion Gap 12 4 - 16    BUN 15 6 - 23 MG/DL    CREATININE 1.5 (H) 0.6 - 1.1 MG/DL    Glucose 101 (H) 70 - 99 MG/DL    Calcium 8.0 (L) 8.3 - 10.6 MG/DL    GFR Non- 33 (L) >60 mL/min/1.73m2    GFR  40 (L) >60 mL/min/1.73m2   Magnesium    Collection Time: 04/12/19  1:29 AM   Result Value Ref Range    Magnesium 1.6 (L) 1.8 - 2.4 mg/dl   Lactic Acid, Plasma    Collection Time: 04/12/19  1:29 AM   Result Value Ref Range    Lactate 0.5 0.4 - 2.0 mMOL/L   Procalcitonin    Collection Time: 04/12/19  1:29 AM   Result Value Ref Range    Procalcitonin 0.154    Iron and TIBC    Collection Time: 04/12/19  1:29 AM   Result Value Ref Range    Iron 20 (L) 37 - 145 ug/dL    UIBC 148 110 - 370 ug/dL    TIBC 168 (L) 250 - 450 ug/dL    Transferrin % 12 10 - 44 %   Ferritin    Collection Time: 04/12/19  1:29 AM   Result Value Ref Range    Ferritin 1,164 (H) 15 - 150 NG/ML     CULTURE results: Invalid input(s): BLOOD CULTURE,  URINE CULTURE, SURGICAL CULTURE    Diagnosis:  Patient Active Problem List   Diagnosis    Angioedema    Cellulitis of right arm    Type 2 diabetes mellitus (HCC)    Stage 3 chronic kidney disease (HCC)    Anemia due to vitamin B12 deficiency    Essential hypertension    Debility    Uncontrolled pain    CKD (chronic kidney disease) stage 4, GFR 15-29 ml/min (HCC)    Gait disturbance    Sepsis (HCC)    GIB (gastrointestinal bleeding)    Iron deficiency anemia    Gastrointestinal hemorrhage associated with anorectal source    Anemia    Colitis       Active Problems  Active Problems:    Colitis  Resolved Problems:    * No resolved hospital problems.  *    Electronically signed by: Electronically signed by Olamide Chase MD on 4/12/2019 at 5:00 PM

## 2019-04-12 NOTE — PROGRESS NOTES
 aspirin EC tablet 81 mg  81 mg Oral Daily Cecilia Borden MD   81 mg at 04/12/19 2936    acetaminophen (TYLENOL) tablet 325 mg  325 mg Oral Q4H PRN Cecilia Borden MD        vitamin D (CHOLECALCIFEROL) tablet 2,000 Units  2,000 Units Oral Daily Cecilia Borden MD   2,000 Units at 04/12/19 0923    diltiazem (CARDIZEM) tablet 30 mg  30 mg Oral 4x Daily Cecilia Borden MD   Stopped at 04/12/19 0935    Fidaxomicin (DIFICID) tablet 200 mg  200 mg Oral BID Cecilia Borden MD   200 mg at 04/12/19 0924    isosorbide mononitrate (IMDUR) extended release tablet 30 mg  30 mg Oral Daily Cecilia Borden MD   30 mg at 04/12/19 0924    lactobacillus (CULTURELLE) capsule 1 capsule  1 capsule Oral TID Cecilia Borden MD   1 capsule at 04/12/19 0924    0.9 % sodium chloride infusion   Intravenous Continuous Cecilia Borden  mL/hr at 04/12/19 0717      prochlorperazine (COMPAZINE) injection 10 mg  10 mg Intravenous Q6H PRN Cecilia Borden MD        [facility]) injection 4 mg  4 mg Intravenous Q6H PRN Cecilia Borden MD        enoxaparin (LOVENOX) injection 30 mg  30 mg Subcutaneous Daily Cecilia Borden MD   30 mg at 04/12/19 9114    hydrALAZINE (APRESOLINE) 10 mg in sodium chloride 0.9 % 50 mL ivpb  10 mg Intravenous Q4H PRN Cecilia Borden MD             Allergies  Allergies   Allergen Reactions    Lisinopril Anaphylaxis       REVIEW OF SYSTEMS     Within above limitations. 14 point review of systems reviewed. Pertinent positive or negative as per HPI or otherwise negative per 14 point systems review. Reviewed 4/12/2019 at 5:23 PM    PHYSICAL EXAM       Blood pressure 138/68, pulse 87, temperature 98 °F (36.7 °C), temperature source Oral, resp. rate 18, height 5' 5\" (1.651 m), weight 116 lb 8 oz (52.8 kg), SpO2 98 %, not currently breastfeeding. General - AAO x 3  Psych - Appropriate affect/speech. No agitation  Eyes - Eye lids intact. No scleral icterus  ENT - Lips wnl. External ear clear/dry/intact.  No thyromegaly

## 2019-04-13 ENCOUNTER — APPOINTMENT (OUTPATIENT)
Dept: GENERAL RADIOLOGY | Age: 84
DRG: 371 | End: 2019-04-13
Payer: MEDICARE

## 2019-04-13 LAB
ALBUMIN SERPL-MCNC: 3.1 GM/DL (ref 3.4–5)
ANION GAP SERPL CALCULATED.3IONS-SCNC: 12 MMOL/L (ref 4–16)
BASOPHILS ABSOLUTE: 0 K/CU MM
BASOPHILS RELATIVE PERCENT: 0.3 % (ref 0–1)
BUN BLDV-MCNC: 13 MG/DL (ref 6–23)
CALCIUM SERPL-MCNC: 8.3 MG/DL (ref 8.3–10.6)
CHLORIDE BLD-SCNC: 108 MMOL/L (ref 99–110)
CO2: 18 MMOL/L (ref 21–32)
CREAT SERPL-MCNC: 1.4 MG/DL (ref 0.6–1.1)
DIFFERENTIAL TYPE: ABNORMAL
EOSINOPHILS ABSOLUTE: 0.1 K/CU MM
EOSINOPHILS RELATIVE PERCENT: 2.1 % (ref 0–3)
GFR AFRICAN AMERICAN: 43 ML/MIN/1.73M2
GFR NON-AFRICAN AMERICAN: 35 ML/MIN/1.73M2
GLUCOSE BLD-MCNC: 101 MG/DL (ref 70–99)
HCT VFR BLD CALC: 25.4 % (ref 37–47)
HEMOGLOBIN: 7.6 GM/DL (ref 12.5–16)
IMMATURE NEUTROPHIL %: 0.5 % (ref 0–0.43)
LACTATE: 0.5 MMOL/L (ref 0.4–2)
LYMPHOCYTES ABSOLUTE: 1.6 K/CU MM
LYMPHOCYTES RELATIVE PERCENT: 24.6 % (ref 24–44)
MAGNESIUM: 1.8 MG/DL (ref 1.8–2.4)
MCH RBC QN AUTO: 28.7 PG (ref 27–31)
MCHC RBC AUTO-ENTMCNC: 29.9 % (ref 32–36)
MCV RBC AUTO: 95.8 FL (ref 78–100)
MONOCYTES ABSOLUTE: 0.5 K/CU MM
MONOCYTES RELATIVE PERCENT: 7 % (ref 0–4)
NUCLEATED RBC %: 0 %
PDW BLD-RTO: 14.4 % (ref 11.7–14.9)
PHOSPHORUS: 3.1 MG/DL (ref 2.5–4.9)
PLATELET # BLD: 206 K/CU MM (ref 140–440)
PMV BLD AUTO: 8.5 FL (ref 7.5–11.1)
POTASSIUM SERPL-SCNC: 3.4 MMOL/L (ref 3.5–5.1)
PROCALCITONIN: 0.12
RBC # BLD: 2.65 M/CU MM (ref 4.2–5.4)
SEGMENTED NEUTROPHILS ABSOLUTE COUNT: 4.3 K/CU MM
SEGMENTED NEUTROPHILS RELATIVE PERCENT: 65.5 % (ref 36–66)
SODIUM BLD-SCNC: 138 MMOL/L (ref 135–145)
TOTAL IMMATURE NEUTOROPHIL: 0.03 K/CU MM
TOTAL NUCLEATED RBC: 0 K/CU MM
WBC # BLD: 6.6 K/CU MM (ref 4–10.5)

## 2019-04-13 PROCEDURE — 6360000002 HC RX W HCPCS: Performed by: INTERNAL MEDICINE

## 2019-04-13 PROCEDURE — 80069 RENAL FUNCTION PANEL: CPT

## 2019-04-13 PROCEDURE — 84145 PROCALCITONIN (PCT): CPT

## 2019-04-13 PROCEDURE — 6370000000 HC RX 637 (ALT 250 FOR IP): Performed by: HOSPITALIST

## 2019-04-13 PROCEDURE — 2580000003 HC RX 258: Performed by: INTERNAL MEDICINE

## 2019-04-13 PROCEDURE — 83605 ASSAY OF LACTIC ACID: CPT

## 2019-04-13 PROCEDURE — 83735 ASSAY OF MAGNESIUM: CPT

## 2019-04-13 PROCEDURE — 6370000000 HC RX 637 (ALT 250 FOR IP): Performed by: INTERNAL MEDICINE

## 2019-04-13 PROCEDURE — 36415 COLL VENOUS BLD VENIPUNCTURE: CPT

## 2019-04-13 PROCEDURE — 2500000003 HC RX 250 WO HCPCS: Performed by: INTERNAL MEDICINE

## 2019-04-13 PROCEDURE — 2140000000 HC CCU INTERMEDIATE R&B

## 2019-04-13 PROCEDURE — 71045 X-RAY EXAM CHEST 1 VIEW: CPT

## 2019-04-13 PROCEDURE — 94761 N-INVAS EAR/PLS OXIMETRY MLT: CPT

## 2019-04-13 PROCEDURE — 2500000003 HC RX 250 WO HCPCS: Performed by: HOSPITALIST

## 2019-04-13 PROCEDURE — 85025 COMPLETE CBC W/AUTO DIFF WBC: CPT

## 2019-04-13 RX ORDER — POTASSIUM CHLORIDE AND SODIUM CHLORIDE 450; 150 MG/100ML; MG/100ML
INJECTION, SOLUTION INTRAVENOUS CONTINUOUS
Status: DISCONTINUED | OUTPATIENT
Start: 2019-04-13 | End: 2019-04-13

## 2019-04-13 RX ORDER — DILTIAZEM HYDROCHLORIDE 60 MG/1
60 TABLET, FILM COATED ORAL EVERY 8 HOURS SCHEDULED
Status: DISCONTINUED | OUTPATIENT
Start: 2019-04-13 | End: 2019-04-17 | Stop reason: HOSPADM

## 2019-04-13 RX ORDER — ACETAMINOPHEN 325 MG/1
650 TABLET ORAL EVERY 4 HOURS PRN
Status: DISCONTINUED | OUTPATIENT
Start: 2019-04-13 | End: 2019-04-17 | Stop reason: HOSPADM

## 2019-04-13 RX ORDER — GUAIFENESIN AND CODEINE PHOSPHATE 100; 10 MG/5ML; MG/5ML
5 SOLUTION ORAL EVERY 4 HOURS PRN
Status: DISCONTINUED | OUTPATIENT
Start: 2019-04-13 | End: 2019-04-17 | Stop reason: HOSPADM

## 2019-04-13 RX ORDER — HYDRALAZINE HYDROCHLORIDE 50 MG/1
50 TABLET, FILM COATED ORAL EVERY 8 HOURS SCHEDULED
Status: DISCONTINUED | OUTPATIENT
Start: 2019-04-13 | End: 2019-04-13 | Stop reason: CLARIF

## 2019-04-13 RX ORDER — HYDRALAZINE HYDROCHLORIDE 50 MG/1
50 TABLET, FILM COATED ORAL EVERY 8 HOURS SCHEDULED
Status: DISCONTINUED | OUTPATIENT
Start: 2019-04-13 | End: 2019-04-14

## 2019-04-13 RX ORDER — FUROSEMIDE 10 MG/ML
20 INJECTION INTRAMUSCULAR; INTRAVENOUS 2 TIMES DAILY
Status: DISCONTINUED | OUTPATIENT
Start: 2019-04-13 | End: 2019-04-14

## 2019-04-13 RX ADMIN — HYDRALAZINE HYDROCHLORIDE 50 MG: 50 TABLET, FILM COATED ORAL at 20:54

## 2019-04-13 RX ADMIN — TAZOBACTAM SODIUM AND PIPERACILLIN SODIUM 3.38 G: 375; 3 INJECTION, SOLUTION INTRAVENOUS at 18:23

## 2019-04-13 RX ADMIN — DILTIAZEM HYDROCHLORIDE 60 MG: 60 TABLET, FILM COATED ORAL at 20:54

## 2019-04-13 RX ADMIN — ISOSORBIDE MONONITRATE 30 MG: 30 TABLET, EXTENDED RELEASE ORAL at 08:59

## 2019-04-13 RX ADMIN — ACETAMINOPHEN 325 MG: 325 TABLET ORAL at 14:36

## 2019-04-13 RX ADMIN — FIDAXOMICIN 200 MG: 200 TABLET, FILM COATED ORAL at 10:58

## 2019-04-13 RX ADMIN — Medication 500 MG: at 07:12

## 2019-04-13 RX ADMIN — MAGNESIUM OXIDE TAB 400 MG (241.3 MG ELEMENTAL MG) 400 MG: 400 (241.3 MG) TAB at 10:58

## 2019-04-13 RX ADMIN — Medication 5 ML: at 20:53

## 2019-04-13 RX ADMIN — HYDRALAZINE HYDROCHLORIDE 50 MG: 50 TABLET, FILM COATED ORAL at 18:21

## 2019-04-13 RX ADMIN — ACETAMINOPHEN 650 MG: 325 TABLET ORAL at 20:53

## 2019-04-13 RX ADMIN — Medication 500 MG: at 18:22

## 2019-04-13 RX ADMIN — Medication 5 ML: at 14:28

## 2019-04-13 RX ADMIN — APIXABAN 2.5 MG: 2.5 TABLET, FILM COATED ORAL at 20:54

## 2019-04-13 RX ADMIN — Medication 1 CAPSULE: at 14:29

## 2019-04-13 RX ADMIN — DILTIAZEM HYDROCHLORIDE 60 MG: 60 TABLET, FILM COATED ORAL at 14:28

## 2019-04-13 RX ADMIN — VITAMIN D, TAB 1000IU (100/BT) 2000 UNITS: 25 TAB at 10:59

## 2019-04-13 RX ADMIN — Medication 1 CAPSULE: at 08:59

## 2019-04-13 RX ADMIN — DILTIAZEM HYDROCHLORIDE 30 MG: 30 TABLET, FILM COATED ORAL at 09:00

## 2019-04-13 RX ADMIN — APIXABAN 2.5 MG: 2.5 TABLET, FILM COATED ORAL at 14:28

## 2019-04-13 RX ADMIN — ENOXAPARIN SODIUM 30 MG: 30 INJECTION SUBCUTANEOUS at 08:59

## 2019-04-13 RX ADMIN — ASPIRIN 81 MG: 81 TABLET, COATED ORAL at 08:59

## 2019-04-13 RX ADMIN — POTASSIUM PHOSPHATE, MONOBASIC AND POTASSIUM PHOSPHATE, DIBASIC 15 MMOL: 224; 236 INJECTION, SOLUTION, CONCENTRATE INTRAVENOUS at 14:39

## 2019-04-13 RX ADMIN — POTASSIUM CHLORIDE AND SODIUM CHLORIDE: 450; 150 INJECTION, SOLUTION INTRAVENOUS at 11:00

## 2019-04-13 RX ADMIN — Medication 1 CAPSULE: at 20:52

## 2019-04-13 RX ADMIN — ACETAMINOPHEN 325 MG: 325 TABLET ORAL at 00:00

## 2019-04-13 ASSESSMENT — PAIN SCALES - GENERAL
PAINLEVEL_OUTOF10: 2
PAINLEVEL_OUTOF10: 2

## 2019-04-13 NOTE — PROGRESS NOTES
arteries were patent. 8.  Bilateral common femoral arteries were patent. 9.  Bilateral profunda were patent. 10.  Left superficial femoral artery has mild disease noted diffusely. 11. Left popliteal artery was patent. 12.  Left anterior tibial artery had an 80% to 90% stenosis noted. It was  ballooned with 3.0 x 80 mm balloon. Lesion decreased to 10%. 13.  Peroneal artery is occluded in the distal segment. 14.  Posterior tibial artery was 100% occluded proximally. Objective:   BP (!) 140/59   Pulse 78   Temp 98.6 °F (37 °C) (Oral)   Resp 20   Ht 5' 5\" (1.651 m)   Wt 125 lb 6.4 oz (56.9 kg)   SpO2 97%   BMI 20.87 kg/m²       Intake/Output Summary (Last 24 hours) at 4/13/2019 1234  Last data filed at 4/13/2019 0844  Gross per 24 hour   Intake 1583.33 ml   Output 450 ml   Net 1133.33 ml       Medications:   Scheduled Meds:   magnesium oxide  400 mg Oral Daily    potassium phosphate IVPB  15 mmol Intravenous Once    vancomycin  500 mg Oral 3 times per day    dicyclomine  20 mg Intramuscular Once    aspirin  81 mg Oral Daily    vitamin D  2,000 Units Oral Daily    diltiazem  30 mg Oral 4x Daily    Fidaxomicin  200 mg Oral BID    isosorbide mononitrate  30 mg Oral Daily    lactobacillus  1 capsule Oral TID    enoxaparin  30 mg Subcutaneous Daily      Infusions:   0.45 % NaCl with KCl 20 mEq 75 mL/hr at 04/13/19 1100      PRN Meds:  acetaminophen, prochlorperazine, ondansetron, hydrALAZINE (APRESOLINE) ivpb       Physical Exam:  Vitals:    04/13/19 1155   BP:    Pulse:    Resp:    Temp:    SpO2: 97%        General: AAO, NAD  Chest: Nontender  Cardiac: First and Second Heart Sounds are Normal, No Murmurs or Gallops noted  Lungs:Clear to auscultation and percussion. Abdomen: Soft, NT, ND, +BS  Extremities: No clubbing, no edema  Vascular:  Equal 2+ peripheral pulses.         Lab Data:  CBC:   Recent Labs     04/11/19  0428 04/12/19  0129 04/13/19  0514   WBC 7.4 7.8 6.6   HGB 8.0* 7.3* 7.6* HCT 25.8* 23.7* 25.4*   MCV 95.6 96.0 95.8    208 206     BMP:   Recent Labs     04/11/19  0428 04/12/19  0129 04/13/19  0514   * 137 138   K 3.4* 3.6 3.4*    104 108   CO2 23 21 18*   PHOS  --   --  3.1   BUN 19 15 13   CREATININE 1.7* 1.5* 1.4*     LIVER PROFILE:   Recent Labs     04/10/19  1837   AST 18   ALT 10   LIPASE 15   BILITOT 0.4   ALKPHOS 50     PT/INR: No results for input(s): PROTIME, INR in the last 72 hours. APTT: No results for input(s): APTT in the last 72 hours. BNP:  No results for input(s): BNP in the last 72 hours.       Assessment:  Patient Active Problem List    Diagnosis Date Noted    Colitis 04/10/2019    Iron deficiency anemia 03/09/2019    Gastrointestinal hemorrhage associated with anorectal source 03/09/2019    Anemia     GIB (gastrointestinal bleeding) 03/08/2019    Sepsis ([facility] 01/08/2019    Debility 01/06/2019    Uncontrolled pain 01/06/2019    CKD (chronic kidney disease) stage 4, GFR 15-29 ml/min ([facility]) 01/06/2019    Gait disturbance 01/06/2019    Cellulitis of right arm 01/01/2019    Type 2 diabetes mellitus ([address] 01/01/2019    Stage 3 chronic kidney disease ([facility] 01/01/2019    Anemia due to vitamin B12 deficiency 01/01/2019    Essential hypertension 01/01/2019    Angioedema 08/20/2016       Turner Wright MD 4/13/2019 12:34 PM

## 2019-04-13 NOTE — CONSULTS
[address] [place] [address], [address] [facility]                                  CONSULTATION    PATIENT NAME: Deacon Carpenter                     :        1930  MED REC NO:   [id number]                          ROOM:       4024  ACCOUNT NO:   [de-identified]                           ADMIT DATE: 04/10/2019  PROVIDER:     Matt Mercado MD    CONSULT DATE:  2019    INDICATION:  Atrial fibrillation. HISTORY OF PRESENT ILLNESS:  This is an 25-year-old female patient who  has atrial fibrillation and some pauses were noted; therefore,  cardiology consult was obtained. The patient is nonsymptomatic. The  patient has been admitted to the hospital with colitis. The patient was  here in the hospital for a long time and discharged after I think 45  plus days and then came back again to the hospital.  Having diarrhea  since 2019. I approved that the patient be seen by ID and treated  with antibiotics. The patient denies any chest pain. No fevers. No  chills. No other  or GI complaints present. PAST MEDICAL HISTORY:  History of having melanoma, diabetes,  hypertension, and hyperlipidemia present. I saw her back in January. The patient had C. diff colitis, on antibiotics. History of coronary  artery disease. She had a heart catheterization done in . Left  main was patent. LAD mid was occluded and was stented. Circ was  stented. Left anterior tibial artery balloon angiography was performed  at that time. PAST SURGICAL HISTORY:  Hysterectomy. SOCIAL HISTORY:  She does not smoke, does not drink. ALLERGIES:  LISINOPRIL, ANAPHYLAXIS. MEDICATIONS AT HOME:  She is on lactobacillus, aspirin, Tylenol, Imdur,  Coreg, and Cardizem. PHYSICAL EXAMINATION:  GENERAL:  The patient is awake, alert, and answering questions. VITAL SIGNS:  Temperature is afebrile, pulse is 79, blood pressure  140/58.   HEENT:  Head is normocephalic and atraumatic. Pupils are equal and  reactive to light. CHEST:  Equal expansion. LUNGS:  Clear to auscultation. Decreased breath sounds present. HEART:  Irregularly irregular. ABDOMEN:  Soft and nontender. Bowel sounds are present. EXTREMITIES:  No cyanosis or clubbing present. DATA:  Creatinine is 1.5. LFTs are normal.  Hemoglobin is 10.3 and  platelet count is 939,453. CT of the abdomen and pelvis was done. Acute sigmoid diverticulitis present. IMPRESSION:  This is an 25-year-old female patient who had a Holter done  back in January and found to have episodes of atrial fibrillation  present with PACs and PVCs noted. She had an echo done in January and  at that time LV function was preserved. At this time from cardiac  stand, she is also known to have coronary artery disease. She is status  post angioplasty done. I would control her rate with beta blockers. Further recommendations based on the hospital course.         Marivel Arango MD    D: 04/12/2019 14:49:28       T: 04/12/2019 21:05:14     [email or web address]  Job#: [id number]     Doc#: [id number]    CC:  <>

## 2019-04-13 NOTE — PROGRESS NOTES
.Progress Note (Hospitalist, Internal Medicine)  IDENTIFYING INFORMATION   PATIENT:  Gen Gutierrez  MRN:  [id number]  ADMIT DATE: 4/10/2019  TIME OF EVALUATION: 4/13/2019 3:35 PM      HISTORY OF PRESENT ILLNESS   Gen Gutierrez is a 80 y.o. female who presents with diarrhea. 70-year-old female presenting to the ED with diarrhea which had been ongoing for about 10 days. Patient found to have C. Diff on presentation for which she was admitted and started on by mouth vancomycin. SUBJECTIVE     There is an improvement in her diarrhea. She registered a fever of 102. She is complaining of persistent coughing. Get a chest x-ray and a UA as well as a pro calcitonin  And will consider expanding her antibiotic coverage. MEDICATIONS   Medications Prior to Admission  Medications Prior to Admission: lactobacillus (CULTURELLE) capsule, Take 1 capsule by mouth 3 times daily  aspirin 81 MG EC tablet, Take 1 tablet by mouth daily  butalbital-acetaminophen-caffeine (FIORICET, ESGIC) -40 MG per tablet, Take 1 tablet by mouth every 4 hours as needed for Headaches  Fidaxomicin (DIFICID) 200 MG TABS tablet, 200mg BID for 8 days before tapering to 100mg QD for 7 days.  Follow up ID for further adjustment  tiZANidine (ZANAFLEX) 2 MG tablet, Take 1 tablet by mouth every 4 hours as needed (neck spasms)  Cholecalciferol (VITAMIN D3) 5000 units TABS, Take by mouth daily  acetaminophen (TYLENOL) 325 MG tablet, Take 325 mg by mouth every 4 hours as needed for Pain (As needed)  isosorbide mononitrate (IMDUR) 30 MG extended release tablet, Take 1 tablet by mouth daily  carvedilol (COREG) 6.25 MG tablet, Take 1 tablet by mouth 2 times daily (with meals)  diltiazem (CARDIZEM) 30 MG tablet, Take 1 tablet by mouth 4 times daily    Current Medications  Current Facility-Administered Medications   Medication Dose Route Frequency Provider Last Rate Last Dose    magnesium oxide (MAG-OX) tablet 400 mg  400 mg Oral Daily Eusebia Valencia MD 400 mg at 04/13/19 1058    potassium phosphate 15 mmol in dextrose 5 % 250 mL IVPB  15 mmol Intravenous Once Pina Elder MD 62.5 mL/hr at 04/13/19 1439 15 mmol at 04/13/19 1439    0.45 % NaCl with KCl 20 mEq infusion   Intravenous Continuous Pina Elder MD 75 mL/hr at 04/13/19 1100      diltiazem (CARDIZEM) tablet 60 mg  60 mg Oral 3 times per day Pina Elder MD   60 mg at 04/13/19 1428    apixaban (ELIQUIS) tablet 2.5 mg  2.5 mg Oral BID Pina Elder MD   2.5 mg at 04/13/19 1428    guaiFENesin-codeine (TUSSI-ORGANIDIN NR) 100-10 MG/5ML syrup 5 mL  5 mL Oral Q4H PRN Halley Munoz MD   5 mL at 04/13/19 1428    acetaminophen (TYLENOL) tablet 650 mg  650 mg Oral Q4H PRN Halley Munoz MD        vancomycin (VANCOCIN) oral solution 500 mg  500 mg Oral 3 times per day Sole Weaver MD   500 mg at 04/13/19 6118    dicyclomine (BENTYL) injection 20 mg  20 mg Intramuscular Once Jazmine Grant MD        aspirin EC tablet 81 mg  81 mg Oral Daily Jazmine Grant MD   81 mg at 04/13/19 0859    acetaminophen (TYLENOL) tablet 325 mg  325 mg Oral Q4H PRN Jazmine Grant MD   325 mg at 04/13/19 1436    vitamin D (CHOLECALCIFEROL) tablet 2,000 Units  2,000 Units Oral Daily Jazmine Grant MD   2,000 Units at 04/13/19 1059    Fidaxomicin (DIFICID) tablet 200 mg  200 mg Oral BID Jazmine Grant MD   200 mg at 04/13/19 1058    isosorbide mononitrate (IMDUR) extended release tablet 30 mg  30 mg Oral Daily Jazmine Grant MD   30 mg at 04/13/19 0859    lactobacillus (CULTURELLE) capsule 1 capsule  1 capsule Oral TID Jazmine Grant MD   1 capsule at 04/13/19 1429    prochlorperazine (COMPAZINE) injection 10 mg  10 mg Intravenous Q6H PRN Jazmine Grant MD        ondansetron [facility]) injection 4 mg  4 mg Intravenous Q6H PRN Jazmine Grant MD        hydrALAZINE (APRESOLINE) 10 mg in sodium chloride 0.9 % 50 mL ivpb  10 mg Intravenous Q4H PRN Jazmine Grant MD             Allergies  Allergies   Allergen Reactions    Lisinopril Anaphylaxis       REVIEW OF SYSTEMS     Within above limitations. 14 point review of systems reviewed. Pertinent positive or negative as per HPI or otherwise negative per 14 point systems review. Reviewed 4/13/2019 at 3:35 PM    PHYSICAL EXAM       Blood pressure (!) 164/82, pulse 78, temperature 102 °F (38.9 °C), resp. rate 20, height 5' 5\" (1.651 m), weight 125 lb 6.4 oz (56.9 kg), SpO2 97 %, not currently breastfeeding. General - AAO x 3  Psych - Appropriate affect/speech. No agitation  Eyes - Eye lids intact. No scleral icterus  ENT - Lips wnl. External ear clear/dry/intact. No thyromegaly on inspection  Neuro - No gross peripheral or central neuro deficits on inspection  Heart - Sinus. RRR. S1 and S2 present. No added HS/murmurs appreciated. No elevated JVD appreciated  Lung - Adequate air entry b/l, No crackles/wheezes appreciated  GI -  Soft. No guarding/rigidity. No hepatosplenomegaly/ascites. BS+   - No CVA/suprapubic tenderness or palpable bladder distension  Skin - Intact. No rash/petechiae/ecchymosis. Warm extremities  MSK - Joints with normal ROM. No joint swellings      Lines/Drains/Airways/Wounds:  [unfilled]    LABS AND IMAGING   CBC  [unfilled]    Last 3 Hemoglobin  Lab Results   Component Value Date    HGB 7.6 04/13/2019    HGB 7.3 04/12/2019    HGB 8.0 04/11/2019     Last 3 WBC/ANC  Lab Results   Component Value Date    WBC 6.6 04/13/2019    WBC 7.8 04/12/2019    WBC 7.4 04/11/2019     No components found for: GRNLOCTYABS  Last 3 Platelets  No results found for: PLATELET  Chemistry  [unfilled]  [unfilled]  Lab Results   Component Value Date     03/07/2019     Coagulation Studies  Lab Results   Component Value Date    INR 1.32 03/10/2019     Liver Function Studies  Lab Results   Component Value Date    ALT 10 04/10/2019    AST 18 04/10/2019    ALKPHOS 50 04/10/2019       Recent Imaging    Relevant labs and imaging reviewed    ASSESSMENT AND PLAN     C.  Diff colitis  Patient started on empiric deficit as well as probiotic and vancomycin. I will consider discontinuing the vancomycin    A. Fib  Patient is rate controlled   Hold off anticoagulation for now.     Persistent cough, center for bronchitis  Patient with a fever  We'll get chest x-ray as well as UA  chest x-ray concerning for pneumonia  Start patient on 0 [facility], Internal Medicine  4/13/2019 at 3:35 PM

## 2019-04-13 NOTE — PROGRESS NOTES
[facility]       Progress Note       2019  8:34 AM    Patient:    Jarred Chow  : 1930   80 y.o. MRN: [id number]  Admitted: 4/10/2019  6:12 PM ATT: Wilfred Stuart MD   [id number]  AdmitDx: Colitis [K52.9]  Sigmoid diverticulitis [K57.32]  Lower abdominal pain [R10.30]  PCP: Marcy Cabrera MD    ASSESSMENT AND PLAN :    C DIFF Improving  Continue Vanco  Can DC Dificid once 10 days done   Advance diet  PT and OT    Patient Active Problem List   Diagnosis Code    Angioedema T78. 3XXA    Cellulitis of right arm L03. 113    Type 2 diabetes mellitus (HCC) E11.9    Stage 3 chronic kidney disease (HCC) N18.3    Anemia due to vitamin B12 deficiency D51.9    Essential hypertension I10    Debility R53.81    Uncontrolled pain R52    CKD (chronic kidney disease) stage 4, GFR 15-29 ml/min (HCC) N18.4    Gait disturbance R26.9    Sepsis (HCC) A41.9    GIB (gastrointestinal bleeding) K92.2    Iron deficiency anemia D50.9    Gastrointestinal hemorrhage associated with anorectal source K62.5    Anemia D64.9    Colitis K52.9       Dr Alonzo Dowd  2019  8:34 AM      SUBJECTIVE:  Chart reviewed, events noted  Patient feeling well. No complaints. Having BM 5 yesterday, today two, more solid. Tolerating po diet. No N/V.  no abdominal pain.     ROS:  Cardiovascular ROS: No chest pain  Gastrointestinal ROS: see above  Respiratory ROS: No SOB    OBJECTIVE:      /70   Pulse 94   Temp 99.1 °F (37.3 °C) (Oral)   Resp 21   Ht 5' 5\" (1.651 m)   Wt 125 lb 6.4 oz (56.9 kg)   SpO2 98%   BMI 20.87 kg/m²     NAD  RRR, Nl s1s2  Lungs CTA Bilaterally, normal effort  Abdomen soft, ND, NT, no HSM, Bowel sounds normal  AAOx3, No asterixis     CBC:   Recent Labs     19  0428 19  0129 19  0514   WBC 7.4 7.8 6.6   HGB 8.0* 7.3* 7.6*    208 206     BMP:    Recent Labs     19  0428 19  0129 19  0514   * 137 138   K Intramuscular Once    aspirin  81 mg Oral Daily    vitamin D  2,000 Units Oral Daily    diltiazem  30 mg Oral 4x Daily    Fidaxomicin  200 mg Oral BID    isosorbide mononitrate  30 mg Oral Daily    lactobacillus  1 capsule Oral TID    enoxaparin  30 mg Subcutaneous Daily     Infusions:    sodium chloride 100 mL/hr at 04/12/19 5418     PRN Medications: acetaminophen, prochlorperazine, ondansetron, hydrALAZINE (APRESOLINE) ivpb  Allergies:    Allergies   Allergen Reactions    Lisinopril Anaphylaxis

## 2019-04-14 LAB
AMORPHOUS: ABNORMAL /HPF
ANION GAP SERPL CALCULATED.3IONS-SCNC: 10 MMOL/L (ref 4–16)
BACTERIA: ABNORMAL /HPF
BASOPHILS ABSOLUTE: 0 K/CU MM
BASOPHILS RELATIVE PERCENT: 0.4 % (ref 0–1)
BILIRUBIN URINE: NEGATIVE MG/DL
BLOOD, URINE: ABNORMAL
BUN BLDV-MCNC: 11 MG/DL (ref 6–23)
CALCIUM SERPL-MCNC: 8 MG/DL (ref 8.3–10.6)
CHLORIDE BLD-SCNC: 104 MMOL/L (ref 99–110)
CLARITY: CLEAR
CO2: 21 MMOL/L (ref 21–32)
COLOR: ABNORMAL
CREAT SERPL-MCNC: 1.5 MG/DL (ref 0.6–1.1)
CULTURE: NORMAL
DIFFERENTIAL TYPE: ABNORMAL
EOSINOPHILS ABSOLUTE: 0.2 K/CU MM
EOSINOPHILS RELATIVE PERCENT: 2.5 % (ref 0–3)
GFR AFRICAN AMERICAN: 40 ML/MIN/1.73M2
GFR NON-AFRICAN AMERICAN: 33 ML/MIN/1.73M2
GLUCOSE BLD-MCNC: 106 MG/DL (ref 70–99)
GLUCOSE, URINE: NEGATIVE MG/DL
HCT VFR BLD CALC: 25 % (ref 37–47)
HEMOGLOBIN: 7.7 GM/DL (ref 12.5–16)
IMMATURE NEUTROPHIL %: 0.7 % (ref 0–0.43)
KETONES, URINE: NEGATIVE MG/DL
LEUKOCYTE ESTERASE, URINE: ABNORMAL
LYMPHOCYTES ABSOLUTE: 1.7 K/CU MM
LYMPHOCYTES RELATIVE PERCENT: 21.6 % (ref 24–44)
Lab: NORMAL
MAGNESIUM: 1.7 MG/DL (ref 1.8–2.4)
MCH RBC QN AUTO: 29.7 PG (ref 27–31)
MCHC RBC AUTO-ENTMCNC: 30.8 % (ref 32–36)
MCV RBC AUTO: 96.5 FL (ref 78–100)
MONOCYTES ABSOLUTE: 0.4 K/CU MM
MONOCYTES RELATIVE PERCENT: 5.8 % (ref 0–4)
NITRITE URINE, QUANTITATIVE: NEGATIVE
NUCLEATED RBC %: 0 %
PDW BLD-RTO: 14.6 % (ref 11.7–14.9)
PH, URINE: 6 (ref 5–8)
PLATELET # BLD: 210 K/CU MM (ref 140–440)
PMV BLD AUTO: 8.9 FL (ref 7.5–11.1)
POTASSIUM SERPL-SCNC: 4 MMOL/L (ref 3.5–5.1)
PROCALCITONIN: 0.18
PROTEIN UA: NEGATIVE MG/DL
RBC # BLD: 2.59 M/CU MM (ref 4.2–5.4)
RBC URINE: 4 /HPF (ref 0–6)
SEGMENTED NEUTROPHILS ABSOLUTE COUNT: 5.3 K/CU MM
SEGMENTED NEUTROPHILS RELATIVE PERCENT: 69 % (ref 36–66)
SODIUM BLD-SCNC: 135 MMOL/L (ref 135–145)
SPECIFIC GRAVITY UA: 1 (ref 1–1.03)
SPECIMEN: NORMAL
TOTAL IMMATURE NEUTOROPHIL: 0.05 K/CU MM
TOTAL NUCLEATED RBC: 0 K/CU MM
TRICHOMONAS: ABNORMAL /HPF
UROBILINOGEN, URINE: NORMAL MG/DL (ref 0.2–1)
WBC # BLD: 7.6 K/CU MM (ref 4–10.5)
WBC UA: 20 /HPF (ref 0–5)

## 2019-04-14 PROCEDURE — 2500000003 HC RX 250 WO HCPCS: Performed by: HOSPITALIST

## 2019-04-14 PROCEDURE — 6370000000 HC RX 637 (ALT 250 FOR IP): Performed by: INTERNAL MEDICINE

## 2019-04-14 PROCEDURE — 6360000002 HC RX W HCPCS: Performed by: HOSPITALIST

## 2019-04-14 PROCEDURE — 85025 COMPLETE CBC W/AUTO DIFF WBC: CPT

## 2019-04-14 PROCEDURE — 6370000000 HC RX 637 (ALT 250 FOR IP): Performed by: HOSPITALIST

## 2019-04-14 PROCEDURE — 2140000000 HC CCU INTERMEDIATE R&B

## 2019-04-14 PROCEDURE — 94761 N-INVAS EAR/PLS OXIMETRY MLT: CPT

## 2019-04-14 PROCEDURE — 83735 ASSAY OF MAGNESIUM: CPT

## 2019-04-14 PROCEDURE — 84145 PROCALCITONIN (PCT): CPT

## 2019-04-14 PROCEDURE — 6360000002 HC RX W HCPCS: Performed by: INTERNAL MEDICINE

## 2019-04-14 PROCEDURE — 36415 COLL VENOUS BLD VENIPUNCTURE: CPT

## 2019-04-14 PROCEDURE — 81001 URINALYSIS AUTO W/SCOPE: CPT

## 2019-04-14 PROCEDURE — 80048 BASIC METABOLIC PNL TOTAL CA: CPT

## 2019-04-14 RX ORDER — MAGNESIUM SULFATE IN WATER 40 MG/ML
2 INJECTION, SOLUTION INTRAVENOUS ONCE
Status: COMPLETED | OUTPATIENT
Start: 2019-04-14 | End: 2019-04-14

## 2019-04-14 RX ORDER — FUROSEMIDE 40 MG/1
40 TABLET ORAL DAILY
Status: DISCONTINUED | OUTPATIENT
Start: 2019-04-14 | End: 2019-04-16

## 2019-04-14 RX ORDER — AMLODIPINE BESYLATE 5 MG/1
5 TABLET ORAL DAILY
Status: DISCONTINUED | OUTPATIENT
Start: 2019-04-14 | End: 2019-04-17 | Stop reason: HOSPADM

## 2019-04-14 RX ADMIN — TAZOBACTAM SODIUM AND PIPERACILLIN SODIUM 3.38 G: 375; 3 INJECTION, SOLUTION INTRAVENOUS at 01:17

## 2019-04-14 RX ADMIN — Medication 500 MG: at 10:14

## 2019-04-14 RX ADMIN — ASPIRIN 81 MG: 81 TABLET, COATED ORAL at 10:01

## 2019-04-14 RX ADMIN — FIDAXOMICIN 200 MG: 200 TABLET, FILM COATED ORAL at 10:12

## 2019-04-14 RX ADMIN — DILTIAZEM HYDROCHLORIDE 60 MG: 60 TABLET, FILM COATED ORAL at 14:08

## 2019-04-14 RX ADMIN — MAGNESIUM SULFATE HEPTAHYDRATE 2 G: 40 INJECTION, SOLUTION INTRAVENOUS at 11:07

## 2019-04-14 RX ADMIN — Medication 1 CAPSULE: at 10:02

## 2019-04-14 RX ADMIN — AMLODIPINE BESYLATE 5 MG: 5 TABLET ORAL at 14:44

## 2019-04-14 RX ADMIN — ISOSORBIDE MONONITRATE 30 MG: 30 TABLET, EXTENDED RELEASE ORAL at 10:01

## 2019-04-14 RX ADMIN — APIXABAN 2.5 MG: 2.5 TABLET, FILM COATED ORAL at 22:24

## 2019-04-14 RX ADMIN — DILTIAZEM HYDROCHLORIDE 60 MG: 60 TABLET, FILM COATED ORAL at 22:24

## 2019-04-14 RX ADMIN — MAGNESIUM OXIDE TAB 400 MG (241.3 MG ELEMENTAL MG) 400 MG: 400 (241.3 MG) TAB at 10:01

## 2019-04-14 RX ADMIN — DILTIAZEM HYDROCHLORIDE 60 MG: 60 TABLET, FILM COATED ORAL at 06:37

## 2019-04-14 RX ADMIN — Medication 1 CAPSULE: at 14:08

## 2019-04-14 RX ADMIN — FUROSEMIDE 20 MG: 10 INJECTION, SOLUTION INTRAVENOUS at 10:02

## 2019-04-14 RX ADMIN — Medication 5 ML: at 06:47

## 2019-04-14 RX ADMIN — TAZOBACTAM SODIUM AND PIPERACILLIN SODIUM 3.38 G: 375; 3 INJECTION, SOLUTION INTRAVENOUS at 17:22

## 2019-04-14 RX ADMIN — HYDRALAZINE HYDROCHLORIDE 50 MG: 50 TABLET, FILM COATED ORAL at 06:40

## 2019-04-14 RX ADMIN — FUROSEMIDE 40 MG: 40 TABLET ORAL at 14:44

## 2019-04-14 RX ADMIN — Medication 500 MG: at 17:26

## 2019-04-14 RX ADMIN — Medication 500 MG: at 01:16

## 2019-04-14 RX ADMIN — TAZOBACTAM SODIUM AND PIPERACILLIN SODIUM 3.38 G: 375; 3 INJECTION, SOLUTION INTRAVENOUS at 10:02

## 2019-04-14 RX ADMIN — FIDAXOMICIN 200 MG: 200 TABLET, FILM COATED ORAL at 22:25

## 2019-04-14 RX ADMIN — VITAMIN D, TAB 1000IU (100/BT) 2000 UNITS: 25 TAB at 10:14

## 2019-04-14 RX ADMIN — Medication 1 CAPSULE: at 22:24

## 2019-04-14 RX ADMIN — Medication 5 ML: at 01:17

## 2019-04-14 RX ADMIN — APIXABAN 2.5 MG: 2.5 TABLET, FILM COATED ORAL at 10:01

## 2019-04-14 RX ADMIN — Medication 5 ML: at 22:33

## 2019-04-14 NOTE — PROGRESS NOTES
Daily Progress Note    patient is awake doing ok  Remain in afib rate stable -on AC for afib   Moved to third floor-not sure the reason  Diarrhea improved  Long term colitis on antibiotics  She would need rehab  Hx of CAD s/p PCI in 2015 and PAD  Correct mag  Anemia stable   HTN --adjusted meds, had received IV Hydralazine     Summary   Left ventricular function is hyperdynamic, EF is estimated at 50 %.  Grade II diastolic dysfunction.   Moderate left ventricular hypertrophy.   Sclerotic, but non-stenotic aortic valve.   Mitral annular calcification is present.  [place] fibrillation limiting mitral valve spectral analysis.   Tricuspid valve is structurally normal.   RVSP is 37 mmHg.   Mild tricuspid regurgitation.   No evidence of pericardial effusion.   No evidence of pleural effusion.     holter --1/19  CONCLUSION:  Abnormal Holter recording, underlying rhythm is sinus with several episode of atrial fibrillation with rapid ventricular response. Also has occasional PACs and occasional PVCs.  The atrial fib rate, one was 124, then one was 105, and then one was 135        PAST MEDICAL HISTORY:  History of having melanoma, diabetes, hypertension, and hyperlipidemia present.  I saw her back in January. The patient had C. diff colitis, on antibiotics.  History of coronary artery disease.  She had a heart catheterization done in 2015.  Left  main was patent.  LAD mid was occluded and was stented.  Circ was stented.  Left anterior tibial artery balloon angiography was performed at that time.     Cath 2015  IMPRESSION:  1.  Left main is patent. 2. Kingsley Rincon has diffuse 50% stenosis noted. 3.  Circumflex had a mid-80% stenosis noted which was stented with a  drug-eluting stent, Xience 3.0 x 23 mm. 4.  The left anterior descending artery had a mid-90% stenosis noted.  Two  stents were deployed, 2.5 x 28 and 2.5 x 12 mm drug-eluting stents, Xience,  were placed. 5.  Bilateral common iliac arteries were patent.   6.  Bilateral internal iliac arteries were patent. 7.  Bilateral external iliac arteries were patent. 8.  Bilateral common femoral arteries were patent. 9.  Bilateral profunda were patent. 10.  Left superficial femoral artery has mild disease noted diffusely. 11. Left popliteal artery was patent. 12.  Left anterior tibial artery had an 80% to 90% stenosis noted.  It was  ballooned with 3.0 x 80 mm balloon.  Lesion decreased to 10%. 13.  Peroneal artery is occluded in the distal segment. 14.  Posterior tibial artery was 100% occluded proximally.             Objective:   BP (!) 136/55   Pulse 86   Temp 99.6 °F (37.6 °C) (Oral)   Resp 20   Ht 5' 5\" (1.651 m)   Wt 124 lb 3.2 oz (56.3 kg)   SpO2 93%   BMI 20.67 kg/m²       Intake/Output Summary (Last 24 hours) at 4/14/2019 1317  Last data filed at 4/14/2019 1303  Gross per 24 hour   Intake 100 ml   Output 500 ml   Net -400 ml       Medications:   Scheduled Meds:   magnesium oxide  400 mg Oral Daily    diltiazem  60 mg Oral 3 times per day    apixaban  2.5 mg Oral BID    piperacillin-tazobactam  3.375 g Intravenous Q8H    hydrALAZINE  50 mg Oral 3 times per day    furosemide  20 mg Intravenous BID    vancomycin  500 mg Oral 3 times per day    dicyclomine  20 mg Intramuscular Once    aspirin  81 mg Oral Daily    vitamin D  2,000 Units Oral Daily    Fidaxomicin  200 mg Oral BID    isosorbide mononitrate  30 mg Oral Daily    lactobacillus  1 capsule Oral TID      Infusions:     PRN Meds:  guaiFENesin-codeine, acetaminophen, acetaminophen, prochlorperazine, ondansetron, hydrALAZINE (APRESOLINE) ivpb       Physical Exam:  Vitals:    04/14/19 1204   BP: (!) 136/55   Pulse: 86   Resp: 20   Temp:    SpO2: 93%        General: AAO, NAD  Chest: Nontender  Cardiac: First and Second Heart Sounds are Normal, No Murmurs or Gallops noted  Lungs:Clear to auscultation and percussion.   Abdomen: Soft, NT, ND, +BS  Extremities: No clubbing, no edema  Vascular:  Equal 2+ peripheral pulses. Lab Data:  CBC:   Recent Labs     04/12/19 0129 04/13/19  0514 04/14/19  0403   WBC 7.8 6.6 7.6   HGB 7.3* 7.6* 7.7*   HCT 23.7* 25.4* 25.0*   MCV 96.0 95.8 96.5    206 210     BMP:   Recent Labs     04/12/19 0129 04/13/19  0514 04/14/19  0403    138 135   K 3.6 3.4* 4.0    108 104   CO2 21 18* 21   PHOS  --  3.1  --    BUN 15 13 11   CREATININE 1.5* 1.4* 1.5*     LIVER PROFILE: No results for input(s): AST, ALT, LIPASE, BILIDIR, BILITOT, ALKPHOS in the last 72 hours. Invalid input(s): AMYLASE,  ALB  PT/INR: No results for input(s): PROTIME, INR in the last 72 hours. APTT: No results for input(s): APTT in the last 72 hours. BNP:  No results for input(s): BNP in the last 72 hours.       Assessment:  Patient Active Problem List    Diagnosis Date Noted    Colitis 04/10/2019    Iron deficiency anemia 03/09/2019    Gastrointestinal hemorrhage associated with anorectal source 03/09/2019    Anemia     GIB (gastrointestinal bleeding) 03/08/2019    [facility] 01/08/2019    Debility 01/06/2019    Uncontrolled pain 01/06/2019    CKD (chronic kidney disease) stage 4, GFR 15-29 ml/min ([facility]) 01/06/2019    Gait disturbance 01/06/2019    Cellulitis of right arm 01/01/2019    Type 2 diabetes mellitus ([facility] 01/01/2019    Stage 3 chronic kidney disease ([facility] 01/01/2019    Anemia due to vitamin B12 deficiency 01/01/2019    Essential hypertension 01/01/2019    Angioedema 08/20/2016       Joi Herrera MD 4/14/2019 1:17 PM

## 2019-04-14 NOTE — PROGRESS NOTES
[facility]        Progress Note       2019  10:04 AM    Patient:    Mitchell Velazquez  : 1930   80 y.o. MRN: [id number]  Admitted: 4/10/2019  6:12 PM ATT: Sumit Tang MD   3130/3130-A  AdmitDx: Colitis [K52.9]  Sigmoid diverticulitis [K57.32]  Lower abdominal pain [R10.30]  PCP: Rodrick Canavan, MD    SUBJECTIVE:  Chart reviewed, events noted  Patient feeling fatigued. Up to bathroom only per patient. Lives at home alone. States does not want to be discharged tomorrow as she doesn't feel well enough. No BM overnight. Tolerating small amount po diet. Denies N/V. Denies abdominal pain. PT/OT eval placed yesterday.      ROS:  The positive ROS will be identified in bold     CONSTITUTIONAL:  Neg  Weight loss, fever, + fatigue   MOUTH/THROAT:  Neg  Bleeding gums, hoarseness or sore throat  RESPIRATORY:   Neg SOB, wheeze, cough, hemoptysis or bronchitis  CARDIOVASCULAR:  Neg Chest pain, palpitations, dyspnea on exertion, edema  GASTROINTESTINAL:  SEE HPI  HEMATOLOGIC/LYMPHATIC:  Neg bleeding tendency, + anemia  MUSCULOSKELETAL: Neg  New myalgias, joint pain, swelling or stiffness  NEUROLOGICAL:  Neg  Loss of Consciousness, memory loss, forgetfulness, periods of confusion, difficulty concentrating, seizures, insomnia, aphasia    SKIN:  Neg No itching, rashes, or sores  PSYCHIATRIC:  Neg Depression, personality changes, anxiety    OBJECTIVE:      BP (!) 120/49   Pulse 71   Temp 99.6 °F (37.6 °C) (Oral)   Resp 20   Ht 5' 5\" (1.651 m)   Wt 124 lb 3.2 oz (56.3 kg)   SpO2 98%   BMI 20.67 kg/m²     NAD, appears comfortable, lying in bed  Lips and mucous membranes pink and moist  RRR, Nl s1s2   Lungs CTA bilaterally, respirations even and unlabored   Abdomen soft, mild distention, NT, bowel sounds normal  Skin pink, warm and dry  No edema bilateral lower extremities   AAOx3      CBC:   Recent Labs     19  0129 19  0514 19  0403   WBC 7.8 6.6 7.6   HGB 7.3* K52.9     Recommendations:    Recurrent c-diff colitis:  Continue Dificid 200 mg BID x10 days, discontinued after 4/20  Receiving Vancomycin 500 mg QID, will taper as outpatient   Recommend Roxie Crowell as outpatient, will initiate PA    Stable from GI standpoint  Consider ECF placement at discharge  Recommend PT/OT    Discussed plan of care with patient     Patient clinical, biochemical, and radiological information discussed with Dr. Judah De La Torre. He agrees with the assessment and plan. Vega Underwood CNP  4/14/2019  10:04 AM     I have seen and examined this patient personally, and independently of the nurse practitioner. The plan was developed mutually at the time of the visit with the patient. Neeta West and myself have spoken with patient, nursing staff and provided written and verbal instructions .     The above note has been reviewed and I agree with the Assessment,  Diagnosis, and Treatment plan as suggested by Neeta West CNP      [facility] gastroenterology

## 2019-04-14 NOTE — PROGRESS NOTES
Pt MEWS of 3. Givn additional tylenol, hydralzine, and cardizem. NP Abigail notified of elevated T 1t 100.4. Applied ice packs and cold cloths to help with T reduction. Pt alert and oriented. LOC intact.

## 2019-04-14 NOTE — PROGRESS NOTES
.Progress Note (Hospitalist, Internal Medicine)  IDENTIFYING INFORMATION   PATIENT:  Emma Raya  MRN:  [id number]  ADMIT DATE: 4/10/2019  TIME OF EVALUATION: 4/14/2019 6:00 PM      HISTORY OF PRESENT ILLNESS   Emma Raya is a 80 y.o. female who presents with diarrhea. 80-year-old female presenting to the ED with diarrhea which had been ongoing for about 10 days. Patient found to have C. Diff on presentation for which she was admitted and started on by mouth vancomycin. SUBJECTIVE     There is an improvement in her diarrhea. She registered a fever of 102. She is complaining of persistent coughing. Get a chest x-ray and a UA as well as a pro calcitonin  And will consider expanding her antibiotic coverage. MEDICATIONS   Medications Prior to Admission  Medications Prior to Admission: lactobacillus (CULTURELLE) capsule, Take 1 capsule by mouth 3 times daily  aspirin 81 MG EC tablet, Take 1 tablet by mouth daily  butalbital-acetaminophen-caffeine (FIORICET, ESGIC) -40 MG per tablet, Take 1 tablet by mouth every 4 hours as needed for Headaches  Fidaxomicin (DIFICID) 200 MG TABS tablet, 200mg BID for 8 days before tapering to 100mg QD for 7 days.  Follow up ID for further adjustment  tiZANidine (ZANAFLEX) 2 MG tablet, Take 1 tablet by mouth every 4 hours as needed (neck spasms)  Cholecalciferol (VITAMIN D3) 5000 units TABS, Take by mouth daily  acetaminophen (TYLENOL) 325 MG tablet, Take 325 mg by mouth every 4 hours as needed for Pain (As needed)  isosorbide mononitrate (IMDUR) 30 MG extended release tablet, Take 1 tablet by mouth daily  carvedilol (COREG) 6.25 MG tablet, Take 1 tablet by mouth 2 times daily (with meals)  diltiazem (CARDIZEM) 30 MG tablet, Take 1 tablet by mouth 4 times daily    Current Medications  Current Facility-Administered Medications   Medication Dose Route Frequency Provider Last Rate Last Dose    furosemide (LASIX) tablet 40 mg  40 mg Oral Daily Tish Sanford MD   40 mg at 04/14/19 1444    amLODIPine (NORVASC) tablet 5 mg  5 mg Oral Daily Aisha Arciniega MD   5 mg at 04/14/19 1444    magnesium oxide (MAG-OX) tablet 400 mg  400 mg Oral Daily Aisha Arciniega MD   400 mg at 04/14/19 1001    diltiazem (CARDIZEM) tablet 60 mg  60 mg Oral 3 times per day Aisha Arciniega MD   60 mg at 04/14/19 1408    apixaban (ELIQUIS) tablet 2.5 mg  2.5 mg Oral BID Aisha Arciniega MD   2.5 mg at 04/14/19 1001    guaiFENesin-codeine (TUSSI-ORGANIDIN NR) 100-10 MG/5ML syrup 5 mL  5 mL Oral Q4H PRN [facility], MD   5 mL at 04/14/19 0647    acetaminophen (TYLENOL) tablet 650 mg  650 mg Oral Q4H PRN [facility], MD   650 mg at 04/13/19 2053    piperacillin-tazobactam (ZOSYN) 3.375 g in dextrose 50 mL IVPB extended infusion (premix)  3.375 g Intravenous Q8H [facility], MD 12.5 mL/hr at 04/14/19 1722 3.375 g at 04/14/19 1722    vancomycin (VANCOCIN) oral solution 500 mg  500 mg Oral 3 times per day Tatiana Young MD   500 mg at 04/14/19 1726    dicyclomine (BENTYL) injection 20 mg  20 mg Intramuscular Once Ling Schroeder MD        aspirin EC tablet 81 mg  81 mg Oral Daily Ling Schroeder MD   81 mg at 04/14/19 1001    acetaminophen (TYLENOL) tablet 325 mg  325 mg Oral Q4H PRN Ling Schroeder MD   325 mg at 04/13/19 1436    vitamin D (CHOLECALCIFEROL) tablet 2,000 Units  2,000 Units Oral Daily Ling Schroeder MD   2,000 Units at 04/14/19 1014    Fidaxomicin (DIFICID) tablet 200 mg  200 mg Oral BID Ling Schroeder MD   200 mg at 04/14/19 1012    isosorbide mononitrate (IMDUR) extended release tablet 30 mg  30 mg Oral Daily Ling Schroeder MD   30 mg at 04/14/19 1001    lactobacillus (CULTURELLE) capsule 1 capsule  1 capsule Oral TID Ling Schroeder MD   1 capsule at 04/14/19 1408    prochlorperazine (COMPAZINE) injection 10 mg  10 mg Intravenous Q6H PRN Ling Schroeder MD        ondansetron [facility]) injection 4 mg  4 mg Intravenous Q6H PRN Ling Schroeder MD        hydrALAZINE (APRESOLINE) 10 mg in sodium chloride 0.9 % 50 mL ivpb  10 mg Intravenous Q4H PRN Kolby Shi MD             Allergies  Allergies   Allergen Reactions    Lisinopril Anaphylaxis       REVIEW OF SYSTEMS     Within above limitations. 14 point review of systems reviewed. Pertinent positive or negative as per HPI or otherwise negative per 14 point systems review. Reviewed 4/14/2019 at 6:00 PM    PHYSICAL EXAM       Blood pressure (!) 154/88, pulse 96, temperature 98.6 °F (37 °C), temperature source Oral, resp. rate 19, height 5' 5\" (1.651 m), weight 124 lb 3.2 oz (56.3 kg), SpO2 97 %, not currently breastfeeding. General - AAO x 3  Psych - Appropriate affect/speech. No agitation  Eyes - Eye lids intact. No scleral icterus  ENT - Lips wnl. External ear clear/dry/intact. No thyromegaly on inspection  Neuro - No gross peripheral or central neuro deficits on inspection  Heart - Sinus. RRR. S1 and S2 present. No added HS/murmurs appreciated. No elevated JVD appreciated  Lung - Adequate air entry b/l, No crackles/wheezes appreciated  GI -  Soft. No guarding/rigidity. No hepatosplenomegaly/ascites. BS+   - No CVA/suprapubic tenderness or palpable bladder distension  Skin - Intact. No rash/petechiae/ecchymosis. Warm extremities  MSK - Joints with normal ROM.  No joint swellings      Lines/Drains/Airways/Wounds:  [unfilled]    LABS AND IMAGING   CBC  [unfilled]    Last 3 Hemoglobin  Lab Results   Component Value Date    HGB 7.7 04/14/2019    HGB 7.6 04/13/2019    HGB 7.3 04/12/2019     Last 3 WBC/ANC  Lab Results   Component Value Date    WBC 7.6 04/14/2019    WBC 6.6 04/13/2019    WBC 7.8 04/12/2019     No components found for: GRNLOCTYABS  Last 3 Platelets  No results found for: PLATELET  Chemistry  [unfilled]  [unfilled]  Lab Results   Component Value Date     03/07/2019     Coagulation Studies  Lab Results   Component Value Date    INR 1.32 03/10/2019     Liver Function Studies  Lab Results   Component Value Date    ALT 10 04/10/2019    AST 18 04/10/2019    ALKPHOS 50 04/10/2019       Recent Imaging    Relevant labs and imaging reviewed    ASSESSMENT AND PLAN     C. Diff colitis  Patient started on empiric deficit as well as probiotic and vancomycin. As per GI, complete 10 day course of Dificid and then taper vancomycin outpatient    A. Fib  Patient is rate controlled   Hold off anticoagulation for now.     Persistent cough, concerning for bronchitis  Patient with a fever    A chest x-ray concerning for pneumonia   Patient on Zosyn      DVT prophylaxis  Patient on apixaban      [facility], Internal Medicine  4/14/2019 at 6:00 PM

## 2019-04-15 LAB
ANION GAP SERPL CALCULATED.3IONS-SCNC: 12 MMOL/L (ref 4–16)
BASOPHILS ABSOLUTE: 0 K/CU MM
BASOPHILS RELATIVE PERCENT: 0.4 % (ref 0–1)
BUN BLDV-MCNC: 17 MG/DL (ref 6–23)
CALCIUM SERPL-MCNC: 8.1 MG/DL (ref 8.3–10.6)
CHLORIDE BLD-SCNC: 104 MMOL/L (ref 99–110)
CO2: 21 MMOL/L (ref 21–32)
CREAT SERPL-MCNC: 1.8 MG/DL (ref 0.6–1.1)
CULTURE: NORMAL
CULTURE: NORMAL
DIFFERENTIAL TYPE: ABNORMAL
EOSINOPHILS ABSOLUTE: 0.5 K/CU MM
EOSINOPHILS RELATIVE PERCENT: 6.1 % (ref 0–3)
GFR AFRICAN AMERICAN: 32 ML/MIN/1.73M2
GFR NON-AFRICAN AMERICAN: 27 ML/MIN/1.73M2
GLUCOSE BLD-MCNC: 117 MG/DL (ref 70–99)
HCT VFR BLD CALC: 23.2 % (ref 37–47)
HEMOGLOBIN: 7.3 GM/DL (ref 12.5–16)
IMMATURE NEUTROPHIL %: 1 % (ref 0–0.43)
LYMPHOCYTES ABSOLUTE: 1.8 K/CU MM
LYMPHOCYTES RELATIVE PERCENT: 23.1 % (ref 24–44)
Lab: NORMAL
Lab: NORMAL
MAGNESIUM: 2.1 MG/DL (ref 1.8–2.4)
MCH RBC QN AUTO: 29.4 PG (ref 27–31)
MCHC RBC AUTO-ENTMCNC: 31.5 % (ref 32–36)
MCV RBC AUTO: 93.5 FL (ref 78–100)
MONOCYTES ABSOLUTE: 0.4 K/CU MM
MONOCYTES RELATIVE PERCENT: 5.2 % (ref 0–4)
NUCLEATED RBC %: 0 %
PDW BLD-RTO: 14.7 % (ref 11.7–14.9)
PLATELET # BLD: 207 K/CU MM (ref 140–440)
PMV BLD AUTO: 8.8 FL (ref 7.5–11.1)
POTASSIUM SERPL-SCNC: 3.7 MMOL/L (ref 3.5–5.1)
RBC # BLD: 2.48 M/CU MM (ref 4.2–5.4)
SEGMENTED NEUTROPHILS ABSOLUTE COUNT: 5 K/CU MM
SEGMENTED NEUTROPHILS RELATIVE PERCENT: 64.2 % (ref 36–66)
SODIUM BLD-SCNC: 137 MMOL/L (ref 135–145)
SPECIMEN: NORMAL
SPECIMEN: NORMAL
TOTAL IMMATURE NEUTOROPHIL: 0.08 K/CU MM
TOTAL NUCLEATED RBC: 0 K/CU MM
WBC # BLD: 7.8 K/CU MM (ref 4–10.5)

## 2019-04-15 PROCEDURE — 6370000000 HC RX 637 (ALT 250 FOR IP): Performed by: INTERNAL MEDICINE

## 2019-04-15 PROCEDURE — 36430 TRANSFUSION BLD/BLD COMPNT: CPT

## 2019-04-15 PROCEDURE — 86900 BLOOD TYPING SEROLOGIC ABO: CPT

## 2019-04-15 PROCEDURE — 6370000000 HC RX 637 (ALT 250 FOR IP): Performed by: HOSPITALIST

## 2019-04-15 PROCEDURE — 94761 N-INVAS EAR/PLS OXIMETRY MLT: CPT

## 2019-04-15 PROCEDURE — 36415 COLL VENOUS BLD VENIPUNCTURE: CPT

## 2019-04-15 PROCEDURE — 85025 COMPLETE CBC W/AUTO DIFF WBC: CPT

## 2019-04-15 PROCEDURE — 2580000003 HC RX 258

## 2019-04-15 PROCEDURE — 83735 ASSAY OF MAGNESIUM: CPT

## 2019-04-15 PROCEDURE — 86850 RBC ANTIBODY SCREEN: CPT

## 2019-04-15 PROCEDURE — 80048 BASIC METABOLIC PNL TOTAL CA: CPT

## 2019-04-15 PROCEDURE — 2500000003 HC RX 250 WO HCPCS: Performed by: HOSPITALIST

## 2019-04-15 PROCEDURE — 86901 BLOOD TYPING SEROLOGIC RH(D): CPT

## 2019-04-15 PROCEDURE — P9016 RBC LEUKOCYTES REDUCED: HCPCS

## 2019-04-15 PROCEDURE — 86922 COMPATIBILITY TEST ANTIGLOB: CPT

## 2019-04-15 PROCEDURE — 2140000000 HC CCU INTERMEDIATE R&B

## 2019-04-15 RX ORDER — AMIODARONE HYDROCHLORIDE 200 MG/1
200 TABLET ORAL DAILY
Status: DISCONTINUED | OUTPATIENT
Start: 2019-04-15 | End: 2019-04-17 | Stop reason: HOSPADM

## 2019-04-15 RX ORDER — 0.9 % SODIUM CHLORIDE 0.9 %
250 INTRAVENOUS SOLUTION INTRAVENOUS ONCE
Status: DISCONTINUED | OUTPATIENT
Start: 2019-04-15 | End: 2019-04-17 | Stop reason: HOSPADM

## 2019-04-15 RX ADMIN — FUROSEMIDE 40 MG: 40 TABLET ORAL at 08:49

## 2019-04-15 RX ADMIN — DILTIAZEM HYDROCHLORIDE 60 MG: 60 TABLET, FILM COATED ORAL at 21:55

## 2019-04-15 RX ADMIN — DILTIAZEM HYDROCHLORIDE 60 MG: 60 TABLET, FILM COATED ORAL at 15:19

## 2019-04-15 RX ADMIN — TAZOBACTAM SODIUM AND PIPERACILLIN SODIUM 3.38 G: 375; 3 INJECTION, SOLUTION INTRAVENOUS at 01:58

## 2019-04-15 RX ADMIN — Medication 1 CAPSULE: at 15:19

## 2019-04-15 RX ADMIN — ACETAMINOPHEN 650 MG: 325 TABLET ORAL at 19:09

## 2019-04-15 RX ADMIN — AMIODARONE HYDROCHLORIDE 200 MG: 200 TABLET ORAL at 10:32

## 2019-04-15 RX ADMIN — DILTIAZEM HYDROCHLORIDE 60 MG: 60 TABLET, FILM COATED ORAL at 06:27

## 2019-04-15 RX ADMIN — Medication 1 CAPSULE: at 08:49

## 2019-04-15 RX ADMIN — Medication 1 CAPSULE: at 21:55

## 2019-04-15 RX ADMIN — Medication 5 ML: at 04:17

## 2019-04-15 RX ADMIN — APIXABAN 2.5 MG: 2.5 TABLET, FILM COATED ORAL at 21:03

## 2019-04-15 RX ADMIN — TAZOBACTAM SODIUM AND PIPERACILLIN SODIUM 3.38 G: 375; 3 INJECTION, SOLUTION INTRAVENOUS at 21:56

## 2019-04-15 RX ADMIN — Medication 500 MG: at 10:33

## 2019-04-15 RX ADMIN — ASPIRIN 81 MG: 81 TABLET, COATED ORAL at 08:49

## 2019-04-15 RX ADMIN — Medication 5 ML: at 21:55

## 2019-04-15 RX ADMIN — FIDAXOMICIN 200 MG: 200 TABLET, FILM COATED ORAL at 22:00

## 2019-04-15 RX ADMIN — VITAMIN D, TAB 1000IU (100/BT) 2000 UNITS: 25 TAB at 10:33

## 2019-04-15 RX ADMIN — AMLODIPINE BESYLATE 5 MG: 5 TABLET ORAL at 08:49

## 2019-04-15 RX ADMIN — APIXABAN 2.5 MG: 2.5 TABLET, FILM COATED ORAL at 08:49

## 2019-04-15 RX ADMIN — TAZOBACTAM SODIUM AND PIPERACILLIN SODIUM 3.38 G: 375; 3 INJECTION, SOLUTION INTRAVENOUS at 08:49

## 2019-04-15 RX ADMIN — MAGNESIUM OXIDE TAB 400 MG (241.3 MG ELEMENTAL MG) 400 MG: 400 (241.3 MG) TAB at 08:49

## 2019-04-15 RX ADMIN — Medication 500 MG: at 01:58

## 2019-04-15 RX ADMIN — ISOSORBIDE MONONITRATE 30 MG: 30 TABLET, EXTENDED RELEASE ORAL at 08:49

## 2019-04-15 RX ADMIN — FIDAXOMICIN 200 MG: 200 TABLET, FILM COATED ORAL at 10:33

## 2019-04-15 ASSESSMENT — PAIN SCALES - GENERAL: PAINLEVEL_OUTOF10: 6

## 2019-04-15 NOTE — PROGRESS NOTES
Occupational Therapy    OT/PT attempted to see pt for initial evaluation. Pt adamantly refused to participate in therapy evaluation this date, stating \"I'll be doing that at [facility]. \" Pt educated extensively on the role of OT in the acute care hospital setting, importance of OOB activity for prevention of bed sores, pneumonia, and GI issues. Pt continued to adamantly refuse despite max education. Will re-attempt at later time as medically appropriate.     More Arriola, OT/L, [facility], .[id number]

## 2019-04-15 NOTE — PROGRESS NOTES
Daily Progress Note     patient is awake alert feeling ok  No chest pain  Remain in afib with NSVT rate stable and BP stable  Diarrhea improving  Colitis improving  Hx of CAD s/p PCI in 2015  Hx of PAD also   electrolytes stable   Anemia stable  Started on oral amiodarone for NSVT    Summary   Left ventricular function is hyperdynamic, EF is estimated at 50 %.  Grade II diastolic dysfunction.   Moderate left ventricular hypertrophy.   Sclerotic, but non-stenotic aortic valve.   Mitral annular calcification is present.  Lewanda Big fibrillation limiting mitral valve spectral analysis.   Tricuspid valve is structurally normal.   RVSP is 37 mmHg.   Mild tricuspid regurgitation.   No evidence of pericardial effusion.   No evidence of pleural effusion.     holter --1/19  CONCLUSION:  Abnormal Holter recording, underlying rhythm is sinus with several episode of atrial fibrillation with rapid ventricular response.  Also has occasional PACs and occasional PVCs.  The atrial fib rate, one was 124, then one was 105, and then one was 135        PAST MEDICAL HISTORY:  History of having melanoma, diabetes, hypertension, and hyperlipidemia present.  I saw her back in January.  The patient had C. diff colitis, on antibiotics.  History of coronary artery disease.  She had a heart catheterization done in 2015.  Left  main was patent.  LAD mid was occluded and was stented.  Circ was stented.  Left anterior tibial artery balloon angiography was performed at that time.     Cath 2015  IMPRESSION:  1.  Left main is patent. 2. Mini Anderson has diffuse 50% stenosis noted. 3.  Circumflex had a mid-80% stenosis noted which was stented with a  drug-eluting stent, Xience 3.0 x 23 mm. 4.  The left anterior descending artery had a mid-90% stenosis noted.  Two  stents were deployed, 2.5 x 28 and 2.5 x 12 mm drug-eluting stents, Xience,  were placed. 5.  Bilateral common iliac arteries were patent.   6.  Bilateral internal iliac arteries were patent. 7.  Bilateral external iliac arteries were patent. 8.  Bilateral common femoral arteries were patent. 9.  Bilateral profunda were patent. 10.  Left superficial femoral artery has mild disease noted diffusely. 11. Left popliteal artery was patent. 12.  Left anterior tibial artery had an 80% to 90% stenosis noted.  It was  ballooned with 3.0 x 80 mm balloon.  Lesion decreased to 10%. 13.  Peroneal artery is occluded in the distal segment. 14.  Posterior tibial artery was 100% occluded proximally.                   Objective:   /67   Pulse 95   Temp 98 °F (36.7 °C) (Oral)   Resp 20   Ht 5' 5\" (1.651 m)   Wt 124 lb 3.2 oz (56.3 kg)   SpO2 100%   BMI 20.67 kg/m²       Intake/Output Summary (Last 24 hours) at 4/15/2019 1952  Last data filed at 4/15/2019 1715  Gross per 24 hour   Intake 740 ml   Output 850 ml   Net -110 ml       Medications:   Scheduled Meds:   amiodarone  200 mg Oral Daily    sodium chloride  250 mL Intravenous Once    furosemide  40 mg Oral Daily    amLODIPine  5 mg Oral Daily    magnesium oxide  400 mg Oral Daily    diltiazem  60 mg Oral 3 times per day    apixaban  2.5 mg Oral BID    piperacillin-tazobactam  3.375 g Intravenous Q8H    vancomycin  500 mg Oral 3 times per day    dicyclomine  20 mg Intramuscular Once    aspirin  81 mg Oral Daily    vitamin D  2,000 Units Oral Daily    Fidaxomicin  200 mg Oral BID    isosorbide mononitrate  30 mg Oral Daily    lactobacillus  1 capsule Oral TID      Infusions:     PRN Meds:  guaiFENesin-codeine, acetaminophen, acetaminophen, prochlorperazine, ondansetron, hydrALAZINE (APRESOLINE) ivpb       Physical Exam:  Vitals:    04/15/19 1740   BP: 129/67   Pulse:    Resp:    Temp: 98 °F (36.7 °C)   SpO2:         General: awake alert   Chest: Nontender  Cardiac: afib  Lungs:Clear to auscultation and percussion. Abdomen: Soft, NT, ND, +BS  Extremities: no  Vascular:  Equal 2+ peripheral pulses.         Lab Data:  CBC:   Recent

## 2019-04-15 NOTE — CARE COORDINATION
LSW spoke with Cyndi Murphy with [facility] and pt can come to their skilled unit once pt is medically stable. Cyndi Murphy stated she will complete the PASS-R. CM will need MARTHA completed by RN and doctor at discharge. If pt is discharged after hours please complete the following. ... Call report to [phone]  Fax completed AVS with both MARTHA on the AVS and any written Rx [phone]. Set up transportation (pt's choice) [employer] [phone] or [employer] [phone] and call family.

## 2019-04-15 NOTE — PROGRESS NOTES
[facility]        Progress Note       4/15/2019  11:40 AM    Patient:    Haydee Delaney  : 1930   80 y.o. MRN: [id number]  Admitted: 4/10/2019  6:12 PM ATT: Padmini Torres MD   3130/3130-A  AdmitDx: Colitis [K52.9]  Sigmoid diverticulitis [K57.32]  Lower abdominal pain [R10.30]  PCP: Jaylon Bey MD    SUBJECTIVE:  Chart reviewed, events noted  Patient feeling well. No complaints. Had small formed BM this am.  No BM yesterday. Eating well. Denies N/V or abd pain . ROS:  The positive ROS will be identified in bold     CONSTITUTIONAL:  Neg  Weight loss, fatigue, fever  RESPIRATORY:   Neg SOB, wheeze, cough, hemoptysis or bronchitis  CARDIOVASCULAR:  Neg Chest pain, palpitations, dyspnea on exertion, edema  GASTROINTESTINAL:  SEE HPI  HEMATOLOGIC/LYMPHATIC:  Neg  Anemia, bleeding tendency      OBJECTIVE:      /69   Pulse 81   Temp 98.3 °F (36.8 °C) (Oral)   Resp 12   Ht 5' 5\" (1.651 m)   Wt 124 lb 3.2 oz (56.3 kg)   SpO2 95%   BMI 20.67 kg/m²     NAD, appears comfortable  Lips and mucous membranes pink and moist  RRR, Nl s1s2,  Lungs CTA bilaterally, respirations even and unlabored   Abdomen soft, ND, NT, bowel sounds normal  Skin pink, warm and dry  No edema bilateral lower extremities   AAOx3    CBC:   Recent Labs     19  0514 19  0403 04/15/19  0421   WBC 6.6 7.6 7.8   HGB 7.6* 7.7* 7.3*    210 207     BMP:    Recent Labs     19  0514 19  0403 04/15/19  0421    135 137   K 3.4* 4.0 3.7    104 104   CO2 18* 21 21   BUN 13 11 17   CREATININE 1.4* 1.5* 1.8*   GLUCOSE 101* 106* 117*     Magnesium:   Lab Results   Component Value Date    MG 2.1 04/15/2019     Hepatic: No results for input(s): AST, ALT, ALB, BILITOT, ALKPHOS in the last 72 hours. INR: No results for input(s): INR in the last 72 hours.       Intake/Output Summary (Last 24 hours) at 4/15/2019 1140  Last data filed at 4/15/2019 0600  Gross per 24 hour Intake 680 ml   Output 1300 ml   Net -620 ml         Medications    Scheduled Medications:    amiodarone  200 mg Oral Daily    sodium chloride  250 mL Intravenous Once    furosemide  40 mg Oral Daily    amLODIPine  5 mg Oral Daily    magnesium oxide  400 mg Oral Daily    diltiazem  60 mg Oral 3 times per day    apixaban  2.5 mg Oral BID    piperacillin-tazobactam  3.375 g Intravenous Q8H    vancomycin  500 mg Oral 3 times per day    dicyclomine  20 mg Intramuscular Once    aspirin  81 mg Oral Daily    vitamin D  2,000 Units Oral Daily    Fidaxomicin  200 mg Oral BID    isosorbide mononitrate  30 mg Oral Daily    lactobacillus  1 capsule Oral TID     PRN Medications: guaiFENesin-codeine, acetaminophen, acetaminophen, prochlorperazine, ondansetron, hydrALAZINE (APRESOLINE) ivpb  Infusions:         Patient Active Problem List   Diagnosis Code    Angioedema T78. 3XXA    Cellulitis of right arm L03. 113    Type 2 diabetes mellitus (HCC) E11.9    Stage 3 chronic kidney disease (HCC) N18.3    Anemia due to vitamin B12 deficiency D51.9    Essential hypertension I10    Debility R53.81    Uncontrolled pain R52    CKD (chronic kidney disease) stage 4, GFR 15-29 ml/min (HCC) N18.4    Gait disturbance R26.9    Sepsis (HCC) A41.9    GIB (gastrointestinal bleeding) K92.2    Iron deficiency anemia D50.9    Gastrointestinal hemorrhage associated with anorectal source K62.5    Anemia D64.9    Colitis K52.9     Assessment/ Recommendations:     Recurrent c-diff colitis:  Continue Dificid 200 mg BID x10 days, last dose 4/20  Receiving Vancomycin 500 mg QID, will taper as outpatient   Recommend Ra Wan as outpatient, will initiate PA  Continue Probiotic daily    Anemia  Multifactorial  Hgb 7.3  1 unit PRBC before discharge     Stable from GI standpoint      Discussed plan of care with patient    Patient clinical, biochemical, and radiological information discussed with Dr. Madelon Spurling.   He agrees with the assessment and plan. Sierra Fernandez CNP  4/15/2019  11:40 AM     I have seen and examined this patient personally, and independently of the nurse practitioner. The plan was developed mutually at the time of the visit with the patient. Sierra Fernandez and myself have spoken with patient, nursing staff and provided written and verbal instructions .     The above note has been reviewed and I agree with the Assessment,  Diagnosis, and Treatment plan as suggested by Sierra Fernandez CNP      [address] [facility] gastroenterology

## 2019-04-15 NOTE — PROGRESS NOTES
.Progress Note (Hospitalist, Internal Medicine)  IDENTIFYING INFORMATION   PATIENT:  Alberto Hudson  MRN:  [id number]  ADMIT DATE: 4/10/2019  TIME OF EVALUATION: 4/15/2019 5:34 PM      HISTORY OF PRESENT ILLNESS   Alberto Hudson is a 80 y.o. female who presents with diarrhea. 44-year-old female presenting to the ED with diarrhea which had been ongoing for about 10 days. Patient found to have C. Diff on presentation for which she was admitted and started on by mouth vancomycin. SUBJECTIVE     She has remained afebrile and there is an improvement in her cough  Patient to be discharged to skilled nursing facility for rehab    MEDICATIONS   Medications Prior to Admission  Medications Prior to Admission: lactobacillus (CULTURELLE) capsule, Take 1 capsule by mouth 3 times daily  aspirin 81 MG EC tablet, Take 1 tablet by mouth daily  butalbital-acetaminophen-caffeine (FIORICET, ESGIC) -40 MG per tablet, Take 1 tablet by mouth every 4 hours as needed for Headaches  Fidaxomicin (DIFICID) 200 MG TABS tablet, 200mg BID for 8 days before tapering to 100mg QD for 7 days.  Follow up ID for further adjustment  tiZANidine (ZANAFLEX) 2 MG tablet, Take 1 tablet by mouth every 4 hours as needed (neck spasms)  Cholecalciferol (VITAMIN D3) 5000 units TABS, Take by mouth daily  acetaminophen (TYLENOL) 325 MG tablet, Take 325 mg by mouth every 4 hours as needed for Pain (As needed)  isosorbide mononitrate (IMDUR) 30 MG extended release tablet, Take 1 tablet by mouth daily  carvedilol (COREG) 6.25 MG tablet, Take 1 tablet by mouth 2 times daily (with meals)  diltiazem (CARDIZEM) 30 MG tablet, Take 1 tablet by mouth 4 times daily    Current Medications  Current Facility-Administered Medications   Medication Dose Route Frequency Provider Last Rate Last Dose    amiodarone (CORDARONE) tablet 200 mg  200 mg Oral Daily Gisselle Nieto MD   200 mg at 04/15/19 1032    0.9 % sodium chloride bolus  250 mL Intravenous Once Kimberly Guzmán APRN - CNP        furosemide (LASIX) tablet 40 mg  40 mg Oral Daily Daisha Stockton MD   40 mg at 04/15/19 0849    amLODIPine (NORVASC) tablet 5 mg  5 mg Oral Daily Daisha Stockton MD   5 mg at 04/15/19 0849    magnesium oxide (MAG-OX) tablet 400 mg  400 mg Oral Daily Daisha Stockton MD   400 mg at 04/15/19 0849    diltiazem (CARDIZEM) tablet 60 mg  60 mg Oral 3 times per day Daisha Stockton MD   60 mg at 04/15/19 1519    apixaban (ELIQUIS) tablet 2.5 mg  2.5 mg Oral BID Daisha Stockton MD   2.5 mg at 04/15/19 0849    guaiFENesin-codeine (TUSSI-ORGANIDIN NR) 100-10 MG/5ML syrup 5 mL  5 mL Oral Q4H PRN Sheree Jones MD   5 mL at 04/15/19 0417    acetaminophen (TYLENOL) tablet 650 mg  650 mg Oral Q4H PRN Sheree Jones MD   650 mg at 04/13/19 2053    piperacillin-tazobactam (ZOSYN) 3.375 g in dextrose 50 mL IVPB extended infusion (premix)  3.375 g Intravenous Q8H Sheree Jones MD   Stopped at 04/15/19 1250    vancomycin (VANCOCIN) oral solution 500 mg  500 mg Oral 3 times per day Sana Hardin MD   500 mg at 04/15/19 1033    dicyclomine (BENTYL) injection 20 mg  20 mg Intramuscular Once Raj Reynoso MD        aspirin EC tablet 81 mg  81 mg Oral Daily Raj Reynoso MD   81 mg at 04/15/19 0849    acetaminophen (TYLENOL) tablet 325 mg  325 mg Oral Q4H PRN Raj Reynoso MD   325 mg at 04/13/19 1436    vitamin D (CHOLECALCIFEROL) tablet 2,000 Units  2,000 Units Oral Daily Raj Reynoso MD   2,000 Units at 04/15/19 1033    Fidaxomicin (DIFICID) tablet 200 mg  200 mg Oral BID Raj Reynoso MD   200 mg at 04/15/19 1033    isosorbide mononitrate (IMDUR) extended release tablet 30 mg  30 mg Oral Daily Raj Reynoso MD   30 mg at 04/15/19 0849    lactobacillus (CULTURELLE) capsule 1 capsule  1 capsule Oral TID Raj Reynoso MD   1 capsule at 04/15/19 1519    prochlorperazine (COMPAZINE) injection 10 mg  10 mg Intravenous Q6H PRN Raj Reynoso MD        ondansetron [facility]) injection 4 mg  4 mg Intravenous Q6H PRN Natalya

## 2019-04-16 LAB
ABO/RH: NORMAL
ANION GAP SERPL CALCULATED.3IONS-SCNC: 14 MMOL/L (ref 4–16)
ANTIBODY SCREEN: NEGATIVE
BASOPHILS ABSOLUTE: 0.1 K/CU MM
BASOPHILS RELATIVE PERCENT: 0.6 % (ref 0–1)
BUN BLDV-MCNC: 20 MG/DL (ref 6–23)
CALCIUM SERPL-MCNC: 8.3 MG/DL (ref 8.3–10.6)
CHLORIDE BLD-SCNC: 101 MMOL/L (ref 99–110)
CO2: 22 MMOL/L (ref 21–32)
COMPONENT: NORMAL
CREAT SERPL-MCNC: 1.9 MG/DL (ref 0.6–1.1)
CROSSMATCH RESULT: NORMAL
DIFFERENTIAL TYPE: ABNORMAL
EOSINOPHILS ABSOLUTE: 0.7 K/CU MM
EOSINOPHILS RELATIVE PERCENT: 8.8 % (ref 0–3)
GFR AFRICAN AMERICAN: 30 ML/MIN/1.73M2
GFR NON-AFRICAN AMERICAN: 25 ML/MIN/1.73M2
GLUCOSE BLD-MCNC: 149 MG/DL (ref 70–99)
HCT VFR BLD CALC: 26.9 % (ref 37–47)
HEMOGLOBIN: 8.5 GM/DL (ref 12.5–16)
IMMATURE NEUTROPHIL %: 0.9 % (ref 0–0.43)
LYMPHOCYTES ABSOLUTE: 1.8 K/CU MM
LYMPHOCYTES RELATIVE PERCENT: 23.5 % (ref 24–44)
MAGNESIUM: 1.9 MG/DL (ref 1.8–2.4)
MCH RBC QN AUTO: 29.6 PG (ref 27–31)
MCHC RBC AUTO-ENTMCNC: 31.6 % (ref 32–36)
MCV RBC AUTO: 93.7 FL (ref 78–100)
MONOCYTES ABSOLUTE: 0.4 K/CU MM
MONOCYTES RELATIVE PERCENT: 5.7 % (ref 0–4)
NUCLEATED RBC %: 0 %
PDW BLD-RTO: 15.4 % (ref 11.7–14.9)
PLATELET # BLD: 222 K/CU MM (ref 140–440)
PMV BLD AUTO: 8.8 FL (ref 7.5–11.1)
POTASSIUM SERPL-SCNC: 3.5 MMOL/L (ref 3.5–5.1)
RBC # BLD: 2.87 M/CU MM (ref 4.2–5.4)
SEGMENTED NEUTROPHILS ABSOLUTE COUNT: 4.7 K/CU MM
SEGMENTED NEUTROPHILS RELATIVE PERCENT: 60.5 % (ref 36–66)
SODIUM BLD-SCNC: 137 MMOL/L (ref 135–145)
STATUS: NORMAL
TOTAL IMMATURE NEUTOROPHIL: 0.07 K/CU MM
TOTAL NUCLEATED RBC: 0 K/CU MM
TRANSFUSION STATUS: NORMAL
UNIT DIVISION: 0
UNIT NUMBER: NORMAL
WBC # BLD: 7.8 K/CU MM (ref 4–10.5)

## 2019-04-16 PROCEDURE — 6370000000 HC RX 637 (ALT 250 FOR IP): Performed by: INTERNAL MEDICINE

## 2019-04-16 PROCEDURE — 85025 COMPLETE CBC W/AUTO DIFF WBC: CPT

## 2019-04-16 PROCEDURE — 6370000000 HC RX 637 (ALT 250 FOR IP): Performed by: HOSPITALIST

## 2019-04-16 PROCEDURE — 2140000000 HC CCU INTERMEDIATE R&B

## 2019-04-16 PROCEDURE — 80048 BASIC METABOLIC PNL TOTAL CA: CPT

## 2019-04-16 PROCEDURE — 2580000003 HC RX 258: Performed by: HOSPITALIST

## 2019-04-16 PROCEDURE — 2500000003 HC RX 250 WO HCPCS: Performed by: HOSPITALIST

## 2019-04-16 PROCEDURE — 36415 COLL VENOUS BLD VENIPUNCTURE: CPT

## 2019-04-16 PROCEDURE — 83735 ASSAY OF MAGNESIUM: CPT

## 2019-04-16 RX ORDER — SODIUM CHLORIDE 9 MG/ML
1000 INJECTION, SOLUTION INTRAVENOUS CONTINUOUS
Status: DISCONTINUED | OUTPATIENT
Start: 2019-04-16 | End: 2019-04-17

## 2019-04-16 RX ORDER — AMOXICILLIN AND CLAVULANATE POTASSIUM 250; 125 MG/1; MG/1
1 TABLET, FILM COATED ORAL EVERY 12 HOURS SCHEDULED
Status: DISCONTINUED | OUTPATIENT
Start: 2019-04-16 | End: 2019-04-17 | Stop reason: HOSPADM

## 2019-04-16 RX ADMIN — Medication 500 MG: at 17:20

## 2019-04-16 RX ADMIN — VITAMIN D, TAB 1000IU (100/BT) 2000 UNITS: 25 TAB at 09:01

## 2019-04-16 RX ADMIN — FIDAXOMICIN 200 MG: 200 TABLET, FILM COATED ORAL at 22:25

## 2019-04-16 RX ADMIN — DILTIAZEM HYDROCHLORIDE 60 MG: 60 TABLET, FILM COATED ORAL at 14:14

## 2019-04-16 RX ADMIN — Medication 1 CAPSULE: at 09:01

## 2019-04-16 RX ADMIN — DILTIAZEM HYDROCHLORIDE 60 MG: 60 TABLET, FILM COATED ORAL at 04:40

## 2019-04-16 RX ADMIN — FUROSEMIDE 40 MG: 40 TABLET ORAL at 09:00

## 2019-04-16 RX ADMIN — SODIUM CHLORIDE 1000 ML: 9 INJECTION, SOLUTION INTRAVENOUS at 15:01

## 2019-04-16 RX ADMIN — MAGNESIUM OXIDE TAB 400 MG (241.3 MG ELEMENTAL MG) 400 MG: 400 (241.3 MG) TAB at 09:01

## 2019-04-16 RX ADMIN — AMIODARONE HYDROCHLORIDE 200 MG: 200 TABLET ORAL at 09:03

## 2019-04-16 RX ADMIN — Medication 5 ML: at 22:25

## 2019-04-16 RX ADMIN — ASPIRIN 81 MG: 81 TABLET, COATED ORAL at 09:00

## 2019-04-16 RX ADMIN — FIDAXOMICIN 200 MG: 200 TABLET, FILM COATED ORAL at 09:00

## 2019-04-16 RX ADMIN — ACETAMINOPHEN 650 MG: 325 TABLET ORAL at 15:05

## 2019-04-16 RX ADMIN — ISOSORBIDE MONONITRATE 30 MG: 30 TABLET, EXTENDED RELEASE ORAL at 09:00

## 2019-04-16 RX ADMIN — APIXABAN 2.5 MG: 2.5 TABLET, FILM COATED ORAL at 22:21

## 2019-04-16 RX ADMIN — DILTIAZEM HYDROCHLORIDE 60 MG: 60 TABLET, FILM COATED ORAL at 22:19

## 2019-04-16 RX ADMIN — AMLODIPINE BESYLATE 5 MG: 5 TABLET ORAL at 09:01

## 2019-04-16 RX ADMIN — AMOXICILLIN AND CLAVULANATE POTASSIUM 1 TABLET: 250; 125 TABLET, FILM COATED ORAL at 22:25

## 2019-04-16 RX ADMIN — Medication 500 MG: at 11:19

## 2019-04-16 RX ADMIN — Medication 1 CAPSULE: at 14:14

## 2019-04-16 RX ADMIN — TAZOBACTAM SODIUM AND PIPERACILLIN SODIUM 3.38 G: 375; 3 INJECTION, SOLUTION INTRAVENOUS at 06:33

## 2019-04-16 RX ADMIN — Medication 500 MG: at 02:03

## 2019-04-16 RX ADMIN — Medication 1 CAPSULE: at 22:21

## 2019-04-16 RX ADMIN — APIXABAN 2.5 MG: 2.5 TABLET, FILM COATED ORAL at 09:01

## 2019-04-16 ASSESSMENT — PAIN DESCRIPTION - PROGRESSION
CLINICAL_PROGRESSION: NOT CHANGED

## 2019-04-16 ASSESSMENT — PAIN SCALES - GENERAL
PAINLEVEL_OUTOF10: 0
PAINLEVEL_OUTOF10: 3
PAINLEVEL_OUTOF10: 0

## 2019-04-16 ASSESSMENT — PAIN DESCRIPTION - ONSET: ONSET: ON-GOING

## 2019-04-16 ASSESSMENT — PAIN DESCRIPTION - LOCATION: LOCATION: HEAD;EYE

## 2019-04-16 ASSESSMENT — PAIN DESCRIPTION - FREQUENCY: FREQUENCY: INTERMITTENT

## 2019-04-16 ASSESSMENT — PAIN DESCRIPTION - DESCRIPTORS: DESCRIPTORS: ACHING

## 2019-04-16 ASSESSMENT — PAIN DESCRIPTION - ORIENTATION: ORIENTATION: LEFT

## 2019-04-16 ASSESSMENT — PAIN DESCRIPTION - PAIN TYPE: TYPE: ACUTE PAIN

## 2019-04-16 ASSESSMENT — PAIN - FUNCTIONAL ASSESSMENT: PAIN_FUNCTIONAL_ASSESSMENT: ACTIVITIES ARE NOT PREVENTED

## 2019-04-16 NOTE — PROGRESS NOTES
Daily Progress Note    I have seen ,spoken to  and examined this patient personally, independently of the Physician assistant . I have reviewed the hospital care given to date and reviewed all pertinent labs and imaging. The plan was developed mutually at the time of the visit with the patient,  PA  and myself. I have spoken with patient, nursing staff and provided written and verbal instructions . The above note has been reviewed and I agree with the assessment, diagnosis, and treatment plan with changes made by me as follows     CARDIOLOGY ATTENDING ADDENDUM    HPI:  I have reviewed the above HPI  And agree with above   Patti Mcgee is a 80 y. o.year old who and presents with had concerns including Abdominal Pain (reports abdominal pain, describes it as \"achiness\") and Diarrhea (patient reports diarrhea for 5 days; patient has a recent history sepsis). Chief Complaint   Patient presents with    Abdominal Pain     reports abdominal pain, describes it as \"achiness\"    Diarrhea     patient reports diarrhea for 5 days; patient has a recent history sepsis     Interval history:  Patient is awake alert doing ok  No chest pain  Remain in afib  no NSVT  Stable from cardiac stand   Keep on current meds  Hx of CAD and PAD-2015  Overall doing better  Diarrhea improving       Physical Exam:  General:  Awake alert   Head:normal  Eye:normal  Neck:  No JVD   Chest:  Clear to auscultation, respiration easy  Cardiovascular:  afib  Abdomen:   nontender  Extremities:  no   Pulses; palpable  Neuro: grossly normal      MEDICAL DECISION MAKING;    I agree with the above plan, which was planned by myself and discussed with PAJhonny Darnell January Electronically signed by MD Sanchez Patton on 4/16/2019 at 1:30 PM     Pt. Awake alert and feeling ok  HR and BP stable, in Afib  Denies CP, SOB    Colitis    Pt. Has C.  Diff-on vanc    Per hospitalist    Afib    NSVT seen on tele as well    On amio    No AC d/t anemia    CAD s/p PCI    Last in 2015 Sepsis ([facility] 01/08/2019    Debility 01/06/2019    Uncontrolled pain 01/06/2019    CKD (chronic kidney disease) stage 4, GFR 15-29 ml/min ([facility]) 01/06/2019    Gait disturbance 01/06/2019    Cellulitis of right arm 01/01/2019    Type 2 diabetes mellitus ([facility] 01/01/2019    Stage 3 chronic kidney disease ([facility] 01/01/2019    Anemia due to vitamin B12 deficiency 01/01/2019    Essential hypertension 01/01/2019    Angioedema 08/20/2016       Electronically signed by Nga Page PA-C on 4/16/2019 at 11:12 AM

## 2019-04-16 NOTE — PROGRESS NOTES
[facility]        Progress Note       2019  10:36 AM    Patient:    Carie Mckenna  : 1930   80 y.o. MRN: [id number]  Admitted: 4/10/2019  6:12 PM ATT: Benjamin Lugo MD   3130/3130-A  AdmitDx: Colitis [K52.9]  Sigmoid diverticulitis [K57.32]  Lower abdominal pain [R10.30]  PCP: Alexei Lewis MD    SUBJECTIVE:  Chart reviewed, events noted  Patient feeling well. No complaints. Had one soft Bm today. Eating well. Denies N/V or abd pain. No fevers overnight. Declined PT.     ROS:  The positive ROS will be identified in bold     CONSTITUTIONAL:  Neg  Weight loss, fatigue, fever  RESPIRATORY:   Neg SOB, wheeze, cough, hemoptysis or bronchitis  CARDIOVASCULAR:  Neg Chest pain, palpitations, dyspnea on exertion, edema  GASTROINTESTINAL:  SEE HPI  HEMATOLOGIC/LYMPHATIC:  Neg  Anemia, bleeding tendency    OBJECTIVE:      BP (!) 149/67   Pulse 83   Temp 97.7 °F (36.5 °C) (Oral)   Resp 24   Ht 5' 5\" (1.651 m)   Wt 124 lb 3.2 oz (56.3 kg)   SpO2 95%   BMI 20.67 kg/m²     NAD, appears comfortable in bed  Lips and mucous membranes pink and moist  Irregular  Lungs CTA bilaterally, respirations even and unlabored   Abdomen soft, ND, NT, bowel sounds normal  Skin pink, warm and dry  No edema bilateral lower extremities   AAOx3      CBC:   Recent Labs     19  0403 04/15/19  0421 19  0317   WBC 7.6 7.8 7.8   HGB 7.7* 7.3* 8.5*    207 222     BMP:    Recent Labs     19  0403 04/15/19  0421 19  0317    137 137   K 4.0 3.7 3.5    104 101   CO2 21 21 22   BUN 11 17 20   CREATININE 1.5* 1.8* 1.9*   GLUCOSE 106* 117* 149*     Magnesium:   Lab Results   Component Value Date    MG 1.9 2019     Hepatic: No results for input(s): AST, ALT, ALB, BILITOT, ALKPHOS in the last 72 hours. INR: No results for input(s): INR in the last 72 hours.       Intake/Output Summary (Last 24 hours) at 2019 1036  Last data filed at 2019 8709  Gross per 24 hour   Intake 1320 ml   Output --   Net 1320 ml         Medications    Scheduled Medications:    amiodarone  200 mg Oral Daily    sodium chloride  250 mL Intravenous Once    furosemide  40 mg Oral Daily    amLODIPine  5 mg Oral Daily    magnesium oxide  400 mg Oral Daily    diltiazem  60 mg Oral 3 times per day    apixaban  2.5 mg Oral BID    piperacillin-tazobactam  3.375 g Intravenous Q8H    vancomycin  500 mg Oral 3 times per day    dicyclomine  20 mg Intramuscular Once    aspirin  81 mg Oral Daily    vitamin D  2,000 Units Oral Daily    Fidaxomicin  200 mg Oral BID    isosorbide mononitrate  30 mg Oral Daily    lactobacillus  1 capsule Oral TID     PRN Medications: guaiFENesin-codeine, acetaminophen, acetaminophen, prochlorperazine, ondansetron, hydrALAZINE (APRESOLINE) ivpb  Infusions:         Patient Active Problem List   Diagnosis Code    Angioedema T78. 3XXA    Cellulitis of right arm L03. 113    Type 2 diabetes mellitus (HCC) E11.9    Stage 3 chronic kidney disease (HCC) N18.3    Anemia due to vitamin B12 deficiency D51.9    Essential hypertension I10    Debility R53.81    Uncontrolled pain R52    CKD (chronic kidney disease) stage 4, GFR 15-29 ml/min (HCC) N18.4    Gait disturbance R26.9    Sepsis (HCC) A41.9    GIB (gastrointestinal bleeding) K92.2    Iron deficiency anemia D50.9    Gastrointestinal hemorrhage associated with anorectal source K62.5    Anemia D64.9    Colitis K52.9         Assessment/ Recommendations:     Recurrent c-diff colitis:  Continue Dificid 200 mg BID x10 days, last dose 4/20  Receiving Vancomycin 500 mg QID, will taper as outpatient   Jessie Binning as outpatient, will initiate PA  Continue Probiotic daily     Anemia  Multifactorial  Hgb 8.5 s/p 1 unit PRBC     Stable from GI standpoint  FU OP       Discussed plan of care with patient    Patient clinical, biochemical, and radiological information discussed with Dr. Ger Fagan.   He agrees with the assessment and plan. Mike Anne CNP  4/16/2019  10:36 AM     I have seen and examined this patient personally, and independently of the nurse practitioner. The plan was developed mutually at the time of the visit with the patient. Mike Anne and myself have spoken with patient, nursing staff and provided written and verbal instructions .     The above note has been reviewed and I agree with the Assessment,  Diagnosis, and Treatment plan as suggested by Mike Anne CNP      [facility]

## 2019-04-16 NOTE — PROGRESS NOTES
Ana Osuna is a 80 y.o. female patient diarrhea is better    Current Facility-Administered Medications   Medication Dose Route Frequency Provider Last Rate Last Dose    amiodarone (CORDARONE) tablet 200 mg  200 mg Oral Daily Gisselle Nieto MD   200 mg at 04/15/19 1032    0.9 % sodium chloride bolus  250 mL Intravenous Once Adams Ruba, APRN - CNP        furosemide (LASIX) tablet 40 mg  40 mg Oral Daily Hakan Zhu MD   40 mg at 04/15/19 0849    amLODIPine (NORVASC) tablet 5 mg  5 mg Oral Daily Hakan Zhu MD   5 mg at 04/15/19 0849    magnesium oxide (MAG-OX) tablet 400 mg  400 mg Oral Daily Hakan Zhu MD   400 mg at 04/15/19 0849    diltiazem (CARDIZEM) tablet 60 mg  60 mg Oral 3 times per day Hakan Zhu MD   60 mg at 04/16/19 0440    apixaban (ELIQUIS) tablet 2.5 mg  2.5 mg Oral BID Hakan Zhu MD   2.5 mg at 04/15/19 2103    guaiFENesin-codeine (TUSSI-ORGANIDIN NR) 100-10 MG/5ML syrup 5 mL  5 mL Oral Q4H PRN Bettie Cisneros MD   5 mL at 04/15/19 2155    acetaminophen (TYLENOL) tablet 650 mg  650 mg Oral Q4H PRN Bettie Cisneros MD   650 mg at 04/15/19 1909    piperacillin-tazobactam (ZOSYN) 3.375 g in dextrose 50 mL IVPB extended infusion (premix)  3.375 g Intravenous Q8H Bettie Cisneros MD   Stopped at 04/16/19 0253    vancomycin (VANCOCIN) oral solution 500 mg  500 mg Oral 3 times per day Bakari Thomason MD   500 mg at 04/16/19 0203    dicyclomine (BENTYL) injection 20 mg  20 mg Intramuscular Once Paola Ramirez MD        aspirin EC tablet 81 mg  81 mg Oral Daily Paola Ramirez MD   81 mg at 04/15/19 0849    acetaminophen (TYLENOL) tablet 325 mg  325 mg Oral Q4H PRN Paola Ramirez MD   325 mg at 04/13/19 1436    vitamin D (CHOLECALCIFEROL) tablet 2,000 Units  2,000 Units Oral Daily Paola Ramirez MD   2,000 Units at 04/15/19 1033    Fidaxomicin (DIFICID) tablet 200 mg  200 mg Oral BID Paola Ramirez MD   200 mg at 04/15/19 2200    isosorbide mononitrate (IMDUR) extended release tablet 30 mg 30 mg Oral Daily Betsy Sarkar MD   30 mg at 04/15/19 0849    lactobacillus (CULTURELLE) capsule 1 capsule  1 capsule Oral TID Betsy Sarkar MD   1 capsule at 04/15/19 1905    prochlorperazine (COMPAZINE) injection 10 mg  10 mg Intravenous Q6H PRN Betsy Sarkar MD        ondansetron [facility]) injection 4 mg  4 mg Intravenous Q6H PRN Betsy Sarkar MD        hydrALAZINE (APRESOLINE) 10 mg in sodium chloride 0.9 % 50 mL ivpb  10 mg Intravenous Q4H PRN Betsy Sarkar MD         Allergies   Allergen Reactions    Lisinopril Anaphylaxis     Active Problems:    Colitis  Resolved Problems:    * No resolved hospital problems. *    Blood pressure 127/79, pulse 85, temperature 97.8 °F (36.6 °C), temperature source Oral, resp. rate 22, height 5' 5\" (1.651 m), weight 124 lb 3.2 oz (56.3 kg), SpO2 96 %, not currently breastfeeding. Subjective:  Symptoms:  Stable. Diet:  Adequate intake. Pain:  She reports no pain. Objective:  General Appearance:  Comfortable. Vital signs: (most recent): Blood pressure (!) 155/84, pulse 93, temperature 98.9 °F (37.2 °C), temperature source Oral, resp. rate 20, height 5' 5\" (1.651 m), weight 124 lb 3.2 oz (56.3 kg), SpO2 95 %, not currently breastfeeding. Vital signs are normal.    HEENT: Normal HEENT exam.    Lungs:  Breath sounds clear to auscultation. Heart: Normal rate. Abdomen: Abdomen is soft. Bowel sounds are normal.     Extremities: Normal range of motion. Neurological: Patient is alert. Pupils:  Pupils are equal, round, and reactive to light. Skin:  Warm.       Assessment & Plan  C.diff colitis/diverticulitis  -Dificid x 10 days and stop 4/20, oral vanc  -bld cx neg  -xinplava outpt  AFib  -elqiuis  -amiodrone  HTN  -CCB, nitrate  Suspected gram pos Pneumonia  -zozyn and change to augmentin  Stable Grade 2 diastolic  -stop lasix with EDUAR  Acute kidney injury  -stop lasix and gentle hydration  Mod PCM  -encourage oral intake    Anticipate discharge tomorrow if renal function better  Etienne Sesay MD  4/16/2019

## 2019-04-16 NOTE — PLAN OF CARE
Problem: Falls - Risk of:  Goal: Will remain free from falls  Description  Will remain free from falls  Outcome: Ongoing  Goal: Absence of physical injury  Description  Absence of physical injury  Outcome: Ongoing     Problem: Discharge Planning:  Goal: Discharged to appropriate level of care  Description  Discharged to appropriate level of care  Outcome: Ongoing  Goal: Participates in care planning  Description  Participates in care planning  Outcome: Ongoing     Problem: Airway Clearance - Ineffective:  Goal: Clear lung sounds  Description  Clear lung sounds  Outcome: Ongoing  Goal: Ability to maintain a clear airway will improve  Description  Ability to maintain a clear airway will improve  Outcome: Ongoing     Problem: Fluid Volume - Deficit:  Goal: Achieves intake and output within specified parameters  Description  Achieves intake and output within specified parameters  Outcome: Ongoing     Problem: Gas Exchange - Impaired:  Goal: Levels of oxygenation will improve  Description  Levels of oxygenation will improve  Outcome: Ongoing     Problem: Hyperthermia:  Goal: Ability to maintain a body temperature in the normal range will improve  Description  Ability to maintain a body temperature in the normal range will improve  Outcome: Ongoing     Problem: Tobacco Use:  Goal: Will participate in inpatient tobacco-use cessation counseling  Description  Will participate in inpatient tobacco-use cessation counseling  Outcome: Ongoing

## 2019-04-17 VITALS
SYSTOLIC BLOOD PRESSURE: 139 MMHG | HEART RATE: 90 BPM | RESPIRATION RATE: 24 BRPM | BODY MASS INDEX: 20.69 KG/M2 | WEIGHT: 124.2 LBS | OXYGEN SATURATION: 96 % | TEMPERATURE: 98.6 F | DIASTOLIC BLOOD PRESSURE: 52 MMHG | HEIGHT: 65 IN

## 2019-04-17 LAB
ANION GAP SERPL CALCULATED.3IONS-SCNC: 12 MMOL/L (ref 4–16)
BUN BLDV-MCNC: 22 MG/DL (ref 6–23)
CALCIUM SERPL-MCNC: 8.7 MG/DL (ref 8.3–10.6)
CHLORIDE BLD-SCNC: 99 MMOL/L (ref 99–110)
CO2: 27 MMOL/L (ref 21–32)
CREAT SERPL-MCNC: 1.6 MG/DL (ref 0.6–1.1)
GFR AFRICAN AMERICAN: 37 ML/MIN/1.73M2
GFR NON-AFRICAN AMERICAN: 30 ML/MIN/1.73M2
GLUCOSE BLD-MCNC: 98 MG/DL (ref 70–99)
POTASSIUM SERPL-SCNC: 3.9 MMOL/L (ref 3.5–5.1)
SODIUM BLD-SCNC: 138 MMOL/L (ref 135–145)

## 2019-04-17 PROCEDURE — 36415 COLL VENOUS BLD VENIPUNCTURE: CPT

## 2019-04-17 PROCEDURE — 6370000000 HC RX 637 (ALT 250 FOR IP): Performed by: INTERNAL MEDICINE

## 2019-04-17 PROCEDURE — 80048 BASIC METABOLIC PNL TOTAL CA: CPT

## 2019-04-17 PROCEDURE — 6370000000 HC RX 637 (ALT 250 FOR IP): Performed by: HOSPITALIST

## 2019-04-17 PROCEDURE — 2580000003 HC RX 258: Performed by: HOSPITALIST

## 2019-04-17 RX ORDER — DILTIAZEM HYDROCHLORIDE 60 MG/1
60 TABLET, FILM COATED ORAL EVERY 8 HOURS SCHEDULED
Qty: 120 TABLET | Refills: 0 | Status: ON HOLD
Start: 2019-04-17 | End: 2019-05-02 | Stop reason: HOSPADM

## 2019-04-17 RX ORDER — AMOXICILLIN AND CLAVULANATE POTASSIUM 250; 125 MG/1; MG/1
1 TABLET, FILM COATED ORAL EVERY 12 HOURS SCHEDULED
Qty: 2 TABLET | Refills: 0
Start: 2019-04-18 | End: 2019-04-19

## 2019-04-17 RX ORDER — AMLODIPINE BESYLATE 5 MG/1
5 TABLET ORAL DAILY
Qty: 30 TABLET | Refills: 0
Start: 2019-04-18 | End: 2019-05-07 | Stop reason: SDUPTHER

## 2019-04-17 RX ORDER — AMIODARONE HYDROCHLORIDE 200 MG/1
200 TABLET ORAL DAILY
Qty: 30 TABLET | Refills: 0
Start: 2019-04-18 | End: 2019-05-07 | Stop reason: SDUPTHER

## 2019-04-17 RX ADMIN — APIXABAN 2.5 MG: 2.5 TABLET, FILM COATED ORAL at 09:51

## 2019-04-17 RX ADMIN — Medication 1 CAPSULE: at 13:49

## 2019-04-17 RX ADMIN — AMLODIPINE BESYLATE 5 MG: 5 TABLET ORAL at 09:51

## 2019-04-17 RX ADMIN — DILTIAZEM HYDROCHLORIDE 60 MG: 60 TABLET, FILM COATED ORAL at 13:49

## 2019-04-17 RX ADMIN — Medication 500 MG: at 04:40

## 2019-04-17 RX ADMIN — VITAMIN D, TAB 1000IU (100/BT) 2000 UNITS: 25 TAB at 09:57

## 2019-04-17 RX ADMIN — MAGNESIUM OXIDE TAB 400 MG (241.3 MG ELEMENTAL MG) 400 MG: 400 (241.3 MG) TAB at 09:51

## 2019-04-17 RX ADMIN — ASPIRIN 81 MG: 81 TABLET, COATED ORAL at 09:51

## 2019-04-17 RX ADMIN — DILTIAZEM HYDROCHLORIDE 60 MG: 60 TABLET, FILM COATED ORAL at 04:45

## 2019-04-17 RX ADMIN — AMOXICILLIN AND CLAVULANATE POTASSIUM 1 TABLET: 250; 125 TABLET, FILM COATED ORAL at 09:57

## 2019-04-17 RX ADMIN — SODIUM CHLORIDE 1000 ML: 9 INJECTION, SOLUTION INTRAVENOUS at 04:40

## 2019-04-17 RX ADMIN — FIDAXOMICIN 200 MG: 200 TABLET, FILM COATED ORAL at 09:56

## 2019-04-17 RX ADMIN — Medication 1 CAPSULE: at 09:50

## 2019-04-17 RX ADMIN — ISOSORBIDE MONONITRATE 30 MG: 30 TABLET, EXTENDED RELEASE ORAL at 09:50

## 2019-04-17 RX ADMIN — AMIODARONE HYDROCHLORIDE 200 MG: 200 TABLET ORAL at 09:51

## 2019-04-17 RX ADMIN — Medication 500 MG: at 09:56

## 2019-04-17 ASSESSMENT — PAIN DESCRIPTION - PROGRESSION
CLINICAL_PROGRESSION: NOT CHANGED

## 2019-04-17 ASSESSMENT — PAIN SCALES - GENERAL
PAINLEVEL_OUTOF10: 0

## 2019-04-17 NOTE — CARE COORDINATION
LSW informed that pt douglas not want to go back to the skilled unit at [facility]. LSW spoke with pt and dgt and pt is now agreeable to go to the skilled unit at [facility]. CM will need MARTHA completed by RN and doctor at discharge. If pt is discharged after hours please complete the following. ... Call report to [phone]  Fax completed AVS with both MARTHA on the AVS and any written Rx [phone]. Set up transportation (pt's choice) [facility] [phone] or [employer] [phone] and call family.

## 2019-04-17 NOTE — PROGRESS NOTES
amio    On AC     CAD s/p PCI    Last in 2015    Med. Tx.    EF normal     Stable from cardiac standpoint-ok to D/C when ok with primary team     Summary   Left ventricular function is hyperdynamic, EF is estimated at 50 %.  Grade II diastolic dysfunction.   Moderate left ventricular hypertrophy.   Sclerotic, but non-stenotic aortic valve.   Mitral annular calcification is present.  [place] Big fibrillation limiting mitral valve spectral analysis.   Tricuspid valve is structurally normal.   RVSP is 37 mmHg.   Mild tricuspid regurgitation.   No evidence of pericardial effusion.   No evidence of pleural effusion.        CONCLUSION:  Abnormal Holter recording, underlying rhythm is sinus with  several episode of atrial fibrillation with rapid ventricular response. Also has occasional PACs and occasional PVCs.  The atrial fib rate, one  was 124, then one was 105, and then one was 135.           PAST MEDICAL HISTORY:  History of having melanoma, diabetes,  hypertension, and hyperlipidemia present.  I saw her back in January. The patient had C. diff colitis, on antibiotics.  History of coronary  artery disease.  She had a heart catheterization done in 2015.  Left  main was patent.  LAD mid was occluded and was stented.  Circ was  stented.  Left anterior tibial artery balloon angiography was performed  at that time.     PAST SURGICAL HISTORY:  Hysterectomy.     SOCIAL HISTORY:  She does not smoke, does not drink.     ALLERGIES:  LISINOPRIL, ANAPHYLAXIS.     MEDICATIONS AT HOME:  She is on lactobacillus, aspirin, Tylenol, Imdur,  Coreg, and Cardizem.         Objective:   BP (!) 147/62   Pulse 90   Temp 98.6 °F (37 °C) (Oral)   Resp 24   Ht 5' 5\" (1.651 m)   Wt 124 lb 3.2 oz (56.3 kg)   SpO2 96%   BMI 20.67 kg/m²       Intake/Output Summary (Last 24 hours) at 4/17/2019 1114  Last data filed at 4/17/2019 0440  Gross per 24 hour   Intake 1841.33 ml   Output 75 ml   Net 1766.33 ml       Medications:   Scheduled Meds:   amoxicillin-clavulanate  1 tablet Oral 2 times per day    amiodarone  200 mg Oral Daily    sodium chloride  250 mL Intravenous Once    amLODIPine  5 mg Oral Daily    magnesium oxide  400 mg Oral Daily    diltiazem  60 mg Oral 3 times per day    apixaban  2.5 mg Oral BID    vancomycin  500 mg Oral 3 times per day    dicyclomine  20 mg Intramuscular Once    aspirin  81 mg Oral Daily    vitamin D  2,000 Units Oral Daily    Fidaxomicin  200 mg Oral BID    isosorbide mononitrate  30 mg Oral Daily    lactobacillus  1 capsule Oral TID      Infusions:     PRN Meds:  guaiFENesin-codeine, acetaminophen, acetaminophen, prochlorperazine, ondansetron, hydrALAZINE (APRESOLINE) ivpb       Physical Exam:  Vitals:    04/17/19 0830   BP: (!) 147/62   Pulse: 90   Resp: 24   Temp: 98.6 °F (37 °C)   SpO2: 96%        General: AAO, NAD  Chest: Nontender  Cardiac: First and Second Heart Sounds are Normal, No Murmurs or Gallops noted  Lungs:Clear to auscultation and percussion. Abdomen: Soft, NT, ND, +BS  Extremities: No clubbing, no edema  Vascular:  Equal 2+ peripheral pulses. Lab Data:  CBC:   Recent Labs     04/15/19  0421 04/16/19  0317   WBC 7.8 7.8   HGB 7.3* 8.5*   HCT 23.2* 26.9*   MCV 93.5 93.7    222     BMP:   Recent Labs     04/15/19  0421 04/16/19  0317 04/17/19  0418    137 138   K 3.7 3.5 3.9    101 99   CO2 21 22 27   BUN 17 20 22   CREATININE 1.8* 1.9* 1.6*     LIVER PROFILE: No results for input(s): AST, ALT, LIPASE, BILIDIR, BILITOT, ALKPHOS in the last 72 hours. Invalid input(s): AMYLASE,  ALB  PT/INR: No results for input(s): PROTIME, INR in the last 72 hours. APTT: No results for input(s): APTT in the last 72 hours. BNP:  No results for input(s): BNP in the last 72 hours.       Assessment:  Patient Active Problem List    Diagnosis Date Noted    Colitis 04/10/2019    Iron deficiency anemia 03/09/2019    Gastrointestinal hemorrhage associated with anorectal source 03/09/2019    Anemia     GIB (gastrointestinal bleeding) 03/08/2019    Sepsis ([facility] 01/08/2019    Debility 01/06/2019    Uncontrolled pain 01/06/2019    CKD (chronic kidney disease) stage 4, GFR 15-29 ml/min [facility] 01/06/2019    Gait disturbance 01/06/2019    Cellulitis of right arm 01/01/2019    [facility] 01/01/2019    Stage 3 chronic kidney disease ([facility] 01/01/2019    Anemia due to vitamin B12 deficiency 01/01/2019    Essential hypertension 01/01/2019    Angioedema 08/20/2016       Electronically signed by Holden Almazan PA-C on 4/17/2019 at 11:14 AM

## 2019-04-17 NOTE — PROGRESS NOTES
Arina Salazar is a 80 y.o. female patient wants to go home    Current Facility-Administered Medications   Medication Dose Route Frequency Provider Last Rate Last Dose    amoxicillin-clavulanate (AUGMENTIN) 250-125 MG per tablet 1 tablet  1 tablet Oral 2 times per day Becca Cai MD   1 tablet at 04/16/19 2225    amiodarone (CORDARONE) tablet 200 mg  200 mg Oral Daily Vin Hart MD   200 mg at 04/16/19 0903    0.9 % sodium chloride bolus  250 mL Intravenous Once MEGAN Copeland Ma - CNP        amLODIPine (NORVASC) tablet 5 mg  5 mg Oral Daily Vin Hart MD   5 mg at 04/16/19 0901    magnesium oxide (MAG-OX) tablet 400 mg  400 mg Oral Daily Vin Hart MD   400 mg at 04/16/19 0901    diltiazem (CARDIZEM) tablet 60 mg  60 mg Oral 3 times per day Vin Hart MD   60 mg at 04/17/19 0445    apixaban (ELIQUIS) tablet 2.5 mg  2.5 mg Oral BID iVn Hart MD   2.5 mg at 04/16/19 2221    guaiFENesin-codeine (TUSSI-ORGANIDIN NR) 100-10 MG/5ML syrup 5 mL  5 mL Oral Q4H PRN Glenn Ramirez MD   5 mL at 04/16/19 2225    acetaminophen (TYLENOL) tablet 650 mg  650 mg Oral Q4H PRN Glenn Ramirez MD   650 mg at 04/16/19 1505    vancomycin (VANCOCIN) oral solution 500 mg  500 mg Oral 3 times per day Glenna Lutz MD   500 mg at 04/17/19 0440    dicyclomine (BENTYL) injection 20 mg  20 mg Intramuscular Once Mak Thomas MD        aspirin EC tablet 81 mg  81 mg Oral Daily Mak Thomas MD   81 mg at 04/16/19 0900    acetaminophen (TYLENOL) tablet 325 mg  325 mg Oral Q4H PRN Mak Thomas MD   325 mg at 04/13/19 1436    vitamin D (CHOLECALCIFEROL) tablet 2,000 Units  2,000 Units Oral Daily Mak Thomas MD   2,000 Units at 04/16/19 0901    Fidaxomicin (DIFICID) tablet 200 mg  200 mg Oral BID aMk Thomas MD   200 mg at 04/16/19 2225    isosorbide mononitrate (IMDUR) extended release tablet 30 mg  30 mg Oral Daily Mak Thomas MD   30 mg at 04/16/19 0900    lactobacillus (CULTURELLE) capsule 1 capsule  1 capsule Oral TID Sancho Cox MD   1 capsule at 04/16/19 2221    prochlorperazine (COMPAZINE) injection 10 mg  10 mg Intravenous Q6H PRN Sancho Cox MD        ondansetron [facility]) injection 4 mg  4 mg Intravenous Q6H PRN Sancho Cox MD        hydrALAZINE (APRESOLINE) 10 mg in sodium chloride 0.9 % 50 mL ivpb  10 mg Intravenous Q4H PRN Sancho Cox MD         Allergies   Allergen Reactions    Lisinopril Anaphylaxis     Active Problems:    Colitis  Resolved Problems:    * No resolved hospital problems. *    Blood pressure 139/79, pulse 89, temperature 98.3 °F (36.8 °C), temperature source Oral, resp. rate 21, height 5' 5\" (1.651 m), weight 124 lb 3.2 oz (56.3 kg), SpO2 93 %, not currently breastfeeding. Subjective:  Symptoms:  Stable. Diet:  Adequate intake. Pain:  She reports no pain. Objective:  General Appearance:  Comfortable. Vital signs: (most recent): Blood pressure 139/79, pulse 89, temperature 98.3 °F (36.8 °C), temperature source Oral, resp. rate 21, height 5' 5\" (1.651 m), weight 124 lb 3.2 oz (56.3 kg), SpO2 93 %, not currently breastfeeding. Vital signs are normal.    HEENT: Normal HEENT exam.    Lungs:  Breath sounds clear to auscultation. Heart: Normal rate. Abdomen: Abdomen is soft. Bowel sounds are normal.     Extremities: Normal range of motion. Neurological: Patient is alert. Pupils:  Pupils are equal, round, and reactive to light. Skin:  Warm.       Assessment & Plan  C.diff colitis/diverticulitis  -Dificid x 10 days and stop 4/20, oral vanc  -bld cx neg  -xinplava outpt  AFib  -elqiuis  -amiodrone  HTN  -CCB, nitrate  Suspected gram pos Pneumonia  -zozyn and change to augmentin  Stable Grade 2 diastolic  -stop lasix with EDUAR  Acute kidney injury  -stop lasix and gentle hydration  Mod PCM  -encourage oral intake    Anticipate discharge today  Latrice Serrato MD  4/17/2019

## 2019-04-17 NOTE — DISCHARGE SUMMARY
Physician Discharge Summary     Patient ID:  Carmelo Gonzalez  [id number]  41 y.o.  4/29/1930    Admit date: 4/10/2019    Discharge date and time: No discharge date for patient encounter. Admitting Physician: Facundo Lopez MD     Discharge Physician:Pedrito    Admission Diagnoses: Colitis [K52.9]  Sigmoid diverticulitis [K57.32]  Lower abdominal pain [R10.30]    Discharge Diagnoses: C.diff colitis and diverticulitis                                          afib                                          HTN(follow up with PCP)                                          Suspected gram pos pna                                          Stable diastolic CHF                                           Acute kidney injury with CKD 3                                          Mod protein calorie malnutrition\        Admission Condition: fair    Discharged Condition: good    Indication for Admission: colitis    Hospital Course:   80 y.o. female with a past medical history probably undiagnosed dementia, AFib, recent hx of recurrent C.diff colitis c/b sepsis who did not remember completing course of dificid since last discharged 3/2019 presents with 8-10 days hx of progressive soft diarrhea liked stool with urge incontinence. Symptoms have not improved in the past few days leading to ED visit. She described consistency as \"slushy\" and would have BM up to 6-7 x daily. She denies any over abdo discomfort or cramps - her abdo appears benign    She was admitted for abd pain and diarrhea and CT abd showed colitis. She was c.diff positive and was placed on dificid and also oral vanc and will need tapering when she follows with GI outpt. Her diarrhea did resolve. GI does want to start xinplava outpt. Cardiology consulted for Afib and was placed on amiodarone and her BB was stopped. CCB was continued with her eliquis. Echo showed grade 2 diastolic dysfunction and placed on diuretics but overdiuresed and her renal function worsened.  Placed on IVF with improved creatinine at baseline. She was also treated for pneumonia but will need the shortest course with her c.diff. bld cx and resp PCR neg.          Consults: cardiology and GI        Outstanding Order Results     No orders found from 3/12/2019 to 4/11/2019. Discharge Exam:  HEENT: Normal HEENT exam.    Lungs:  Breath sounds clear to auscultation. Heart: Normal rate. Abdomen: Abdomen is soft. Bowel sounds are normal.     Extremities: Normal range of motion. Neurological: Patient is alert. Pupils:  Pupils are equal, round, and reactive to light. Skin:  Warm.           Disposition: home    Patient Instructions:      Medication List      START taking these medications    amiodarone 200 MG tablet  Commonly known as:  CORDARONE  Take 1 tablet by mouth daily  Start taking on:  4/18/2019     amLODIPine 5 MG tablet  Commonly known as:  NORVASC  Take 1 tablet by mouth daily  Start taking on:  4/18/2019     amoxicillin-clavulanate 250-125 MG per tablet  Commonly known as:  AUGMENTIN  Take 1 tablet by mouth every 12 hours for 1 day  Start taking on:  4/18/2019     apixaban 2.5 MG Tabs tablet  Commonly known as:  ELIQUIS  Take 1 tablet by mouth 2 times daily     magnesium oxide 400 (241.3 Mg) MG Tabs tablet  Commonly known as:  MAG-OX  Take 1 tablet by mouth daily  Start taking on:  4/18/2019     vancomycin 50 mg/mL oral solution  Commonly known as:  VANCOCIN  Take 10 mLs by mouth every 8 hours        CHANGE how you take these medications    diltiazem 60 MG tablet  Commonly known as:  CARDIZEM  Take 1 tablet by mouth every 8 hours  What changed:    · medication strength  · how much to take  · when to take this        CONTINUE taking these medications    acetaminophen 325 MG tablet  Commonly known as:  TYLENOL     aspirin 81 MG EC tablet  Take 1 tablet by mouth daily     butalbital-acetaminophen-caffeine -40 MG per tablet  Commonly known as:  FIORICET, ESGIC  Take 1 tablet by mouth every 4 hours as needed for Headaches     Fidaxomicin 200 MG Tabs tablet  Commonly known as:  DIFICID  200mg BID for 8 days before tapering to 100mg QD for 7 days. Follow up ID for further adjustment     isosorbide mononitrate 30 MG extended release tablet  Commonly known as:  IMDUR  Take 1 tablet by mouth daily     lactobacillus capsule  Take 1 capsule by mouth 3 times daily     tiZANidine 2 MG tablet  Commonly known as:  ZANAFLEX  Take 1 tablet by mouth every 4 hours as needed (neck spasms)     Vitamin D3 5000 units Tabs        STOP taking these medications    carvedilol 6.25 MG tablet  Commonly known as:  COREG           Where to Get Your Medications      Information about where to get these medications is not yet available    Ask your nurse or doctor about these medications  · amiodarone 200 MG tablet  · amLODIPine 5 MG tablet  · amoxicillin-clavulanate 250-125 MG per tablet  · apixaban 2.5 MG Tabs tablet  · diltiazem 60 MG tablet  · magnesium oxide 400 (241.3 Mg) MG Tabs tablet  · vancomycin 50 mg/mL oral solution         Activity: activity as tolerated  Diet: cardiac diet  Wound Care: none needed    Follow-up with PCP  Discharge time 35min.     Wallace Tan  4/17/2019  12:04 PM

## 2019-04-17 NOTE — PROGRESS NOTES
Had a long discussion with pt about letting PT and OT do their evaluation. Pt states she wants to go to her condo which is part of the [address]. She thought the PT and OT here were trying to get her to go to the rehab downstairs. With some difficulty, I explained this was not the case. They were only here to evaluate to recommend the best placement for the pt. I reinforced if she wanted to go to home, she needs to be able to show she can handle herself at home by letting PT/OT evaluate her. Pt still having some trouble understanding. So we left he conversation with the understanding if PT/OT came to evaluate her, she needed to let them do so. She said ok as long s it didn't hold up her plans to leave today. She is also ok with going to [facility] rehab.

## 2019-04-29 ENCOUNTER — OFFICE VISIT (OUTPATIENT)
Dept: INTERNAL MEDICINE CLINIC | Age: 84
End: 2019-04-29
Payer: MEDICARE

## 2019-04-29 VITALS
SYSTOLIC BLOOD PRESSURE: 118 MMHG | HEART RATE: 60 BPM | BODY MASS INDEX: 21.49 KG/M2 | RESPIRATION RATE: 18 BRPM | DIASTOLIC BLOOD PRESSURE: 58 MMHG | HEIGHT: 62 IN | WEIGHT: 116.8 LBS

## 2019-04-29 DIAGNOSIS — I48.0 PAF (PAROXYSMAL ATRIAL FIBRILLATION) (HCC): ICD-10-CM

## 2019-04-29 DIAGNOSIS — Z79.01 CHRONIC ANTICOAGULATION: ICD-10-CM

## 2019-04-29 DIAGNOSIS — D63.8 ANEMIA, CHRONIC DISEASE: ICD-10-CM

## 2019-04-29 DIAGNOSIS — N18.4 CKD (CHRONIC KIDNEY DISEASE), STAGE IV (HCC): ICD-10-CM

## 2019-04-29 DIAGNOSIS — Z98.61 CAD S/P PERCUTANEOUS CORONARY ANGIOPLASTY: ICD-10-CM

## 2019-04-29 DIAGNOSIS — N18.4 TYPE 2 DIABETES MELLITUS WITH STAGE 4 CHRONIC KIDNEY DISEASE, WITHOUT LONG-TERM CURRENT USE OF INSULIN (HCC): Primary | ICD-10-CM

## 2019-04-29 DIAGNOSIS — I25.10 CAD S/P PERCUTANEOUS CORONARY ANGIOPLASTY: ICD-10-CM

## 2019-04-29 DIAGNOSIS — E11.22 TYPE 2 DIABETES MELLITUS WITH STAGE 4 CHRONIC KIDNEY DISEASE, WITHOUT LONG-TERM CURRENT USE OF INSULIN (HCC): Primary | ICD-10-CM

## 2019-04-29 DIAGNOSIS — E78.2 MIXED HYPERLIPIDEMIA: ICD-10-CM

## 2019-04-29 DIAGNOSIS — A04.72 CLOSTRIDIUM DIFFICILE COLITIS: ICD-10-CM

## 2019-04-29 PROBLEM — Z12.11 COLON CANCER SCREENING: Status: ACTIVE | Noted: 2019-04-29

## 2019-04-29 PROBLEM — D51.9 ANEMIA DUE TO VITAMIN B12 DEFICIENCY: Status: RESOLVED | Noted: 2019-01-01 | Resolved: 2019-04-29

## 2019-04-29 PROBLEM — L03.113 CELLULITIS OF RIGHT ARM: Status: RESOLVED | Noted: 2019-01-01 | Resolved: 2019-04-29

## 2019-04-29 PROBLEM — K52.9 COLITIS: Status: RESOLVED | Noted: 2019-04-10 | Resolved: 2019-04-29

## 2019-04-29 PROBLEM — D50.9 IRON DEFICIENCY ANEMIA: Status: RESOLVED | Noted: 2019-03-09 | Resolved: 2019-04-29

## 2019-04-29 PROBLEM — A41.9 SEPSIS (HCC): Status: RESOLVED | Noted: 2019-01-08 | Resolved: 2019-04-29

## 2019-04-29 PROBLEM — R53.81 DEBILITY: Status: RESOLVED | Noted: 2019-01-06 | Resolved: 2019-04-29

## 2019-04-29 PROBLEM — R52 UNCONTROLLED PAIN: Status: RESOLVED | Noted: 2019-01-06 | Resolved: 2019-04-29

## 2019-04-29 PROBLEM — N18.30 STAGE 3 CHRONIC KIDNEY DISEASE (HCC): Status: RESOLVED | Noted: 2019-01-01 | Resolved: 2019-04-29

## 2019-04-29 PROCEDURE — G8598 ASA/ANTIPLAT THER USED: HCPCS | Performed by: INTERNAL MEDICINE

## 2019-04-29 PROCEDURE — 1111F DSCHRG MED/CURRENT MED MERGE: CPT | Performed by: INTERNAL MEDICINE

## 2019-04-29 PROCEDURE — 1090F PRES/ABSN URINE INCON ASSESS: CPT | Performed by: INTERNAL MEDICINE

## 2019-04-29 PROCEDURE — G8420 CALC BMI NORM PARAMETERS: HCPCS | Performed by: INTERNAL MEDICINE

## 2019-04-29 PROCEDURE — 1123F ACP DISCUSS/DSCN MKR DOCD: CPT | Performed by: INTERNAL MEDICINE

## 2019-04-29 PROCEDURE — 1036F TOBACCO NON-USER: CPT | Performed by: INTERNAL MEDICINE

## 2019-04-29 PROCEDURE — G8427 DOCREV CUR MEDS BY ELIG CLIN: HCPCS | Performed by: INTERNAL MEDICINE

## 2019-04-29 PROCEDURE — 4040F PNEUMOC VAC/ADMIN/RCVD: CPT | Performed by: INTERNAL MEDICINE

## 2019-04-29 PROCEDURE — 99205 OFFICE O/P NEW HI 60 MIN: CPT | Performed by: INTERNAL MEDICINE

## 2019-04-29 ASSESSMENT — ENCOUNTER SYMPTOMS
COLOR CHANGE: 0
VOMITING: 0
NAUSEA: 0
EYE ITCHING: 0
BLOOD IN STOOL: 0
CHEST TIGHTNESS: 0
SHORTNESS OF BREATH: 0
TROUBLE SWALLOWING: 0
COUGH: 0
ABDOMINAL PAIN: 0
WHEEZING: 0
EYE DISCHARGE: 0
DIARRHEA: 0

## 2019-04-29 NOTE — PATIENT INSTRUCTIONS
YOUR LIST OF MEDS STATES YOU ARE TAKING XARELTO 2.5 MG TWICE DAILY    YOUR ARE SUPPOSED TO BE TAKING ELIQUIS 2.5 MG TWICE DAILY AND I GAVE YOU NEW SCRIPT    Check BS before breakfast and supper 2 days each week    Get fasting labs done this week

## 2019-04-29 NOTE — PROGRESS NOTES
Subjective: Rj Rahman is a 80 y.o. female who presents today as a new patient. She is transferring from [place] since he doesn't go to hospital    Patient Active Problem List   Diagnosis    Essential hypertension    Gait disturbance    GIB (gastrointestinal bleeding)    Type II diabetes mellitus ([facility]    Colon cancer screening    Anemia, chronic disease    PAF (paroxysmal atrial fibrillation) (HCC)    CAD S/P percutaneous coronary angioplasty    CKD (chronic kidney disease), stage IV (HCC)    Chronic anticoagulation    Clostridium difficile colitis    Bradycardia       SHE HAS BEEN MISTAKENLY BEEN TAKING XARELTO 2.5 MG BID FOR PAF AND NEEDS ELIQUIS 2.5 BID AND IT WAS GOING TO BE CHANGED TODAY, BUT AFTER DUE CONSIDERATION HER ANTICOAG WAS STOPPED DUE TO THE MULTIPLE EPISODES OF GI BLEEDING SHE HAS HAD SINCE JAN 2019 AND HER LAST HGB OF 8.5 ON 4/16    SEES [facility] FOR CARDIOLOGY  SHE HASN'T SEEN HIM SINCE JAN. Has had recurrent C. Diff off and on since Jan;   Is now completing vancomycin   The patient denies abdominal or flank pain, anorexia, nausea or vomiting, dysphagia, change in bowel habits or black or bloody stools or weight loss. No diarrhea ; eating OK with normal BMs    Lives in [facility] in [facility]  She states her memory is sharp  Drives, but not recently due to prolonged , severe illness  Lost 15 lbs  Was in hospital 24 d the first time in Jan and 15 d in April    Recently she was admitted 4/10 and has been out of hosp since 4/17.   She was in rehab at [facility] home until a week ago  She had sepsis, C diff and diverticulitis on this last admission  Nicola handled C diff    She formerly checked BS at home ,but no longer as her NIDDM meds were stopped  Will now check BS bid 2 d a week    She was admitted to [place] [address] in March with acute blood loss anemia and C diff  This was treated with dificid then    She was in ARU in Jan with Dr lujan for rehab after GI bleed and blood loss anemia  She difficulty urinating, dysuria, hematuria and vaginal bleeding. Musculoskeletal: Negative for gait problem, joint swelling, myalgias and neck stiffness. Skin: Negative for color change, pallor and rash. Neurological: Positive for dizziness. Negative for tremors, speech difficulty and numbness. Hematological: Negative for adenopathy. Does not bruise/bleed easily. Psychiatric/Behavioral: Positive for decreased concentration. Negative for behavioral problems and dysphoric mood. The patient is not nervous/anxious. Objective:      BP (!) 118/58   Pulse 60   Resp 18   Ht 5' 2\" (1.575 m)   Wt 116 lb 12.8 oz (53 kg)   Breastfeeding? No   BMI 21.36 kg/m²      Physical Exam   Constitutional: She is oriented to person, place, and time. She appears well-developed and well-nourished. No distress. HENT:   Head: Normocephalic and atraumatic. Mouth/Throat: Oropharynx is clear and moist.   Mucus memb mildly pallid   Eyes: Conjunctivae and EOM are normal. No scleral icterus. Neck: Neck supple. No JVD present. No thyromegaly present. Cardiovascular: Normal rate and regular rhythm. Murmur heard. Grade 1/6 systolic outflow M   Pulmonary/Chest: Effort normal and breath sounds normal. No respiratory distress. She has no wheezes. She has no rales. Abdominal: Soft. She exhibits no distension. There is no tenderness. Musculoskeletal: Normal range of motion. She exhibits no edema, tenderness or deformity. Lymphadenopathy:     She has no cervical adenopathy. Neurological: She is alert and oriented to person, place, and time. No cranial nerve deficit. Coordination normal.   Skin: Skin is warm and dry. No rash noted. No erythema. There is pallor. Very mildly pale   Psychiatric: She has a normal mood and affect. Her behavior is normal.   Vitals reviewed.     Hospital records of several admissions this year and consultants notes and lab and imaging rev'd    Last hgb was 8.5 on 4/16  Last creat 1.9 with GFR 25       Assessment / Plan:      1. Type 2 diabetes mellitus with stage 4 chronic kidney disease, without long-term current use of insulin ([facility]    2. Anemia, chronic disease    3. Clostridium difficile colitis    4. Mixed hyperlipidemia    5. PAF (paroxysmal atrial fibrillation) ([facility]    6. CAD S/P percutaneous coronary angioplasty    7. CKD (chronic kidney disease), stage IV ([facility]    8. Chronic anticoagulation            Plan   Due to GI bleed in Jan ; another admission in early March with hgb 8.3 which had dropped by 2 grams from before, and also having been seen in March by Dr Sabine Song for hematochezia and melena. She had readmission on 4/10/2019 with hgb 8.0  No anticoag other than prophylactic lovenox given in hosp.   Get lab tomorrow  Her hgb on 4/15 was 7.3  She was transfuse one PRBC prior to discharge  Though her discharge meds included order for eliquis 2.5 bid , she has not been taking it, but has script for xarelto 2.5 bid  Will stop anticoag for now and use just ASA  Will take iron bid and monitor hb    Take only meds listed above  If eliquis is expensive do NOT fill it and don't take any anticoag  RTC 72 hrs  Orders Placed This Encounter   Procedures    CBC Auto Differential    Comprehensive Metabolic Panel    Hemoglobin A1C    Lipid Panel     Had her sign for release of records from [place]     I rev'd her records from [place] on 4/30  I found she had xarelto 15 mg stopped in 8/2018 and was not taking it when he last saw her in office in 1/2019  amaryl was also stopped in 2018    Her Ferritin has been returned as >1000  Will need eval for hemochromatosis/ hemosiderosis    Her k came back 6.4 and she was sent to ER and admitted 5/1  Will f/u after hosp

## 2019-04-30 DIAGNOSIS — N18.4 TYPE 2 DIABETES MELLITUS WITH STAGE 4 CHRONIC KIDNEY DISEASE, WITHOUT LONG-TERM CURRENT USE OF INSULIN (HCC): ICD-10-CM

## 2019-04-30 DIAGNOSIS — E78.2 MIXED HYPERLIPIDEMIA: ICD-10-CM

## 2019-04-30 DIAGNOSIS — E11.22 TYPE 2 DIABETES MELLITUS WITH STAGE 4 CHRONIC KIDNEY DISEASE, WITHOUT LONG-TERM CURRENT USE OF INSULIN (HCC): ICD-10-CM

## 2019-04-30 LAB
A/G RATIO: 1.1 (ref 1.1–2.2)
ALBUMIN SERPL-MCNC: 4 G/DL (ref 3.4–5)
ALP BLD-CCNC: 79 U/L (ref 40–129)
ALT SERPL-CCNC: 12 U/L (ref 10–40)
ANION GAP SERPL CALCULATED.3IONS-SCNC: 16 MMOL/L (ref 3–16)
AST SERPL-CCNC: 15 U/L (ref 15–37)
BASOPHILS ABSOLUTE: 0.1 K/UL (ref 0–0.2)
BASOPHILS RELATIVE PERCENT: 0.9 %
BILIRUB SERPL-MCNC: 0.3 MG/DL (ref 0–1)
BUN BLDV-MCNC: 38 MG/DL (ref 7–20)
CALCIUM SERPL-MCNC: 9.4 MG/DL (ref 8.3–10.6)
CHLORIDE BLD-SCNC: 98 MMOL/L (ref 99–110)
CHOLESTEROL, TOTAL: 248 MG/DL (ref 0–199)
CO2: 23 MMOL/L (ref 21–32)
CREAT SERPL-MCNC: 2.3 MG/DL (ref 0.6–1.2)
EOSINOPHILS ABSOLUTE: 0.4 K/UL (ref 0–0.6)
EOSINOPHILS RELATIVE PERCENT: 3.9 %
GFR AFRICAN AMERICAN: 24
GFR NON-AFRICAN AMERICAN: 20
GLOBULIN: 3.6 G/DL
GLUCOSE BLD-MCNC: 99 MG/DL (ref 70–99)
HCT VFR BLD CALC: 31.1 % (ref 36–48)
HDLC SERPL-MCNC: 63 MG/DL (ref 40–60)
HEMOGLOBIN: 10.1 G/DL (ref 12–16)
LDL CHOLESTEROL CALCULATED: 159 MG/DL
LYMPHOCYTES ABSOLUTE: 1.8 K/UL (ref 1–5.1)
LYMPHOCYTES RELATIVE PERCENT: 18.2 %
MCH RBC QN AUTO: 30.3 PG (ref 26–34)
MCHC RBC AUTO-ENTMCNC: 32.6 G/DL (ref 31–36)
MCV RBC AUTO: 93.1 FL (ref 80–100)
MONOCYTES ABSOLUTE: 0.7 K/UL (ref 0–1.3)
MONOCYTES RELATIVE PERCENT: 7.6 %
NEUTROPHILS ABSOLUTE: 6.7 K/UL (ref 1.7–7.7)
NEUTROPHILS RELATIVE PERCENT: 69.4 %
PDW BLD-RTO: 15.6 % (ref 12.4–15.4)
PLATELET # BLD: 395 K/UL (ref 135–450)
PMV BLD AUTO: 7.3 FL (ref 5–10.5)
POTASSIUM SERPL-SCNC: 6.4 MMOL/L (ref 3.5–5.1)
RBC # BLD: 3.34 M/UL (ref 4–5.2)
SODIUM BLD-SCNC: 137 MMOL/L (ref 136–145)
TOTAL PROTEIN: 7.6 G/DL (ref 6.4–8.2)
TRIGL SERPL-MCNC: 131 MG/DL (ref 0–150)
VLDLC SERPL CALC-MCNC: 26 MG/DL
WBC # BLD: 9.7 K/UL (ref 4–11)

## 2019-04-30 RX ORDER — FERROUS SULFATE 325(65) MG
325 TABLET ORAL 2 TIMES DAILY
Qty: 180 TABLET | Refills: 1 | Status: SHIPPED | OUTPATIENT
Start: 2019-04-30

## 2019-04-30 RX ORDER — ISOSORBIDE MONONITRATE 30 MG/1
30 TABLET, EXTENDED RELEASE ORAL DAILY
Qty: 30 TABLET | Refills: 0 | Status: SHIPPED | OUTPATIENT
Start: 2019-04-30 | End: 2019-07-17 | Stop reason: SDUPTHER

## 2019-05-01 ENCOUNTER — HOSPITAL ENCOUNTER (INPATIENT)
Age: 84
LOS: 1 days | Discharge: HOME HEALTH | DRG: 641 | End: 2019-05-02
Attending: EMERGENCY MEDICINE | Admitting: HOSPITALIST
Payer: MEDICARE

## 2019-05-01 ENCOUNTER — APPOINTMENT (OUTPATIENT)
Dept: GENERAL RADIOLOGY | Age: 84
DRG: 641 | End: 2019-05-01
Payer: MEDICARE

## 2019-05-01 ENCOUNTER — APPOINTMENT (OUTPATIENT)
Dept: ULTRASOUND IMAGING | Age: 84
DRG: 641 | End: 2019-05-01
Payer: MEDICARE

## 2019-05-01 DIAGNOSIS — R00.1 BRADYCARDIA: Primary | ICD-10-CM

## 2019-05-01 DIAGNOSIS — N17.9 ACUTE KIDNEY INJURY (HCC): ICD-10-CM

## 2019-05-01 DIAGNOSIS — E87.5 HYPERKALEMIA: ICD-10-CM

## 2019-05-01 PROBLEM — K62.5 GASTROINTESTINAL HEMORRHAGE ASSOCIATED WITH ANORECTAL SOURCE: Status: RESOLVED | Noted: 2019-03-09 | Resolved: 2019-05-01

## 2019-05-01 LAB
ALBUMIN SERPL-MCNC: 3.7 GM/DL (ref 3.4–5)
ALP BLD-CCNC: 75 IU/L (ref 40–129)
ALT SERPL-CCNC: 12 U/L (ref 10–40)
ANION GAP SERPL CALCULATED.3IONS-SCNC: 12 MMOL/L (ref 4–16)
ANION GAP SERPL CALCULATED.3IONS-SCNC: 13 MMOL/L (ref 4–16)
AST SERPL-CCNC: 15 IU/L (ref 15–37)
BACTERIA: NEGATIVE /HPF
BASOPHILS ABSOLUTE: 0.1 K/CU MM
BASOPHILS RELATIVE PERCENT: 0.9 % (ref 0–1)
BILIRUB SERPL-MCNC: 0.2 MG/DL (ref 0–1)
BILIRUBIN URINE: NEGATIVE MG/DL
BLOOD, URINE: NEGATIVE
BUN BLDV-MCNC: 42 MG/DL (ref 6–23)
BUN BLDV-MCNC: 44 MG/DL (ref 6–23)
CALCIUM SERPL-MCNC: 10.5 MG/DL (ref 8.3–10.6)
CALCIUM SERPL-MCNC: 9.1 MG/DL (ref 8.3–10.6)
CHLORIDE BLD-SCNC: 97 MMOL/L (ref 99–110)
CHLORIDE BLD-SCNC: 98 MMOL/L (ref 99–110)
CHLORIDE URINE RANDOM: 48 MMOL/L (ref 43–210)
CHP ED QC CHECK: YES
CLARITY: CLEAR
CO2: 22 MMOL/L (ref 21–32)
CO2: 26 MMOL/L (ref 21–32)
COLOR: COLORLESS
CREAT SERPL-MCNC: 2.6 MG/DL (ref 0.6–1.1)
CREAT SERPL-MCNC: 2.6 MG/DL (ref 0.6–1.1)
DIFFERENTIAL TYPE: ABNORMAL
EKG ATRIAL RATE: 33 BPM
EKG ATRIAL RATE: 64 BPM
EKG ATRIAL RATE: 94 BPM
EKG DIAGNOSIS: NORMAL
EKG P-R INTERVAL: 230 MS
EKG Q-T INTERVAL: 450 MS
EKG Q-T INTERVAL: 488 MS
EKG Q-T INTERVAL: 536 MS
EKG QRS DURATION: 102 MS
EKG QRS DURATION: 86 MS
EKG QRS DURATION: 96 MS
EKG QTC CALCULATION (BAZETT): 407 MS
EKG QTC CALCULATION (BAZETT): 414 MS
EKG QTC CALCULATION (BAZETT): 464 MS
EKG R AXIS: -28 DEGREES
EKG R AXIS: -31 DEGREES
EKG R AXIS: -31 DEGREES
EKG T AXIS: 49 DEGREES
EKG T AXIS: 53 DEGREES
EKG T AXIS: 70 DEGREES
EKG VENTRICULAR RATE: 36 BPM
EKG VENTRICULAR RATE: 42 BPM
EKG VENTRICULAR RATE: 64 BPM
EOSINOPHILS ABSOLUTE: 0.5 K/CU MM
EOSINOPHILS RELATIVE PERCENT: 6.4 % (ref 0–3)
ESTIMATED AVERAGE GLUCOSE: 125.5 MG/DL
GFR AFRICAN AMERICAN: 21 ML/MIN/1.73M2
GFR AFRICAN AMERICAN: 21 ML/MIN/1.73M2
GFR NON-AFRICAN AMERICAN: 17 ML/MIN/1.73M2
GFR NON-AFRICAN AMERICAN: 17 ML/MIN/1.73M2
GLUCOSE BLD-MCNC: 102 MG/DL (ref 70–99)
GLUCOSE BLD-MCNC: 104 MG/DL (ref 70–99)
GLUCOSE BLD-MCNC: 106 MG/DL (ref 70–99)
GLUCOSE BLD-MCNC: 117 MG/DL
GLUCOSE BLD-MCNC: 117 MG/DL (ref 70–99)
GLUCOSE BLD-MCNC: 150 MG/DL (ref 70–99)
GLUCOSE BLD-MCNC: 179 MG/DL (ref 70–99)
GLUCOSE BLD-MCNC: 55 MG/DL (ref 70–99)
GLUCOSE, URINE: 50 MG/DL
HBA1C MFR BLD: 6 %
HCT VFR BLD CALC: 30.6 % (ref 37–47)
HEMOGLOBIN: 9.2 GM/DL (ref 12.5–16)
IMMATURE NEUTROPHIL %: 0.3 % (ref 0–0.43)
KETONES, URINE: NEGATIVE MG/DL
LEUKOCYTE ESTERASE, URINE: NEGATIVE
LYMPHOCYTES ABSOLUTE: 2.2 K/CU MM
LYMPHOCYTES RELATIVE PERCENT: 29.1 % (ref 24–44)
MAGNESIUM: 1.9 MG/DL (ref 1.8–2.4)
MCH RBC QN AUTO: 29.4 PG (ref 27–31)
MCHC RBC AUTO-ENTMCNC: 30.1 % (ref 32–36)
MCV RBC AUTO: 97.8 FL (ref 78–100)
MONOCYTES ABSOLUTE: 0.6 K/CU MM
MONOCYTES RELATIVE PERCENT: 8 % (ref 0–4)
NITRITE URINE, QUANTITATIVE: NEGATIVE
NUCLEATED RBC %: 0 %
PDW BLD-RTO: 14.6 % (ref 11.7–14.9)
PH, URINE: 8 (ref 5–8)
PLATELET # BLD: 378 K/CU MM (ref 140–440)
PMV BLD AUTO: 8.7 FL (ref 7.5–11.1)
POTASSIUM SERPL-SCNC: 4.5 MMOL/L (ref 3.5–5.1)
POTASSIUM SERPL-SCNC: 5.3 MMOL/L (ref 3.5–5.1)
POTASSIUM, UR: 20.2 MMOL/L (ref 22–119)
PROTEIN UA: NEGATIVE MG/DL
RBC # BLD: 3.13 M/CU MM (ref 4.2–5.4)
RBC URINE: ABNORMAL /HPF (ref 0–6)
SEGMENTED NEUTROPHILS ABSOLUTE COUNT: 4.2 K/CU MM
SEGMENTED NEUTROPHILS RELATIVE PERCENT: 55.3 % (ref 36–66)
SODIUM BLD-SCNC: 132 MMOL/L (ref 135–145)
SODIUM BLD-SCNC: 136 MMOL/L (ref 135–145)
SODIUM URINE: 60 MMOL/L (ref 35–167)
SPECIFIC GRAVITY UA: 1 (ref 1–1.03)
SQUAMOUS EPITHELIAL: <1 /HPF
T4 FREE: 1.04 NG/DL (ref 0.9–1.8)
TOTAL IMMATURE NEUTOROPHIL: 0.02 K/CU MM
TOTAL NUCLEATED RBC: 0 K/CU MM
TOTAL PROTEIN: 7.2 GM/DL (ref 6.4–8.2)
TOTAL RETICULOCYTE COUNT: 0.03 K/CU MM
TRICHOMONAS: ABNORMAL /HPF
TROPONIN T: 0.01 NG/ML
TROPONIN T: <0.01 NG/ML
TROPONIN T: <0.01 NG/ML
TSH HIGH SENSITIVITY: 3.38 UIU/ML (ref 0.27–4.2)
UROBILINOGEN, URINE: NORMAL MG/DL (ref 0.2–1)
WBC # BLD: 7.7 K/CU MM (ref 4–10.5)
WBC UA: 1 /HPF (ref 0–5)

## 2019-05-01 PROCEDURE — 6370000000 HC RX 637 (ALT 250 FOR IP): Performed by: EMERGENCY MEDICINE

## 2019-05-01 PROCEDURE — 76775 US EXAM ABDO BACK WALL LIM: CPT

## 2019-05-01 PROCEDURE — 2580000003 HC RX 258: Performed by: INTERNAL MEDICINE

## 2019-05-01 PROCEDURE — 82436 ASSAY OF URINE CHLORIDE: CPT

## 2019-05-01 PROCEDURE — 2500000003 HC RX 250 WO HCPCS: Performed by: EMERGENCY MEDICINE

## 2019-05-01 PROCEDURE — 84133 ASSAY OF URINE POTASSIUM: CPT

## 2019-05-01 PROCEDURE — 71045 X-RAY EXAM CHEST 1 VIEW: CPT

## 2019-05-01 PROCEDURE — 80053 COMPREHEN METABOLIC PANEL: CPT

## 2019-05-01 PROCEDURE — 2580000003 HC RX 258: Performed by: EMERGENCY MEDICINE

## 2019-05-01 PROCEDURE — 2580000003 HC RX 258

## 2019-05-01 PROCEDURE — 84443 ASSAY THYROID STIM HORMONE: CPT

## 2019-05-01 PROCEDURE — 84484 ASSAY OF TROPONIN QUANT: CPT

## 2019-05-01 PROCEDURE — 94761 N-INVAS EAR/PLS OXIMETRY MLT: CPT

## 2019-05-01 PROCEDURE — 6370000000 HC RX 637 (ALT 250 FOR IP): Performed by: INTERNAL MEDICINE

## 2019-05-01 PROCEDURE — 6360000002 HC RX W HCPCS: Performed by: HOSPITALIST

## 2019-05-01 PROCEDURE — 2580000003 HC RX 258: Performed by: HOSPITALIST

## 2019-05-01 PROCEDURE — 84300 ASSAY OF URINE SODIUM: CPT

## 2019-05-01 PROCEDURE — 85025 COMPLETE CBC W/AUTO DIFF WBC: CPT

## 2019-05-01 PROCEDURE — 2140000000 HC CCU INTERMEDIATE R&B

## 2019-05-01 PROCEDURE — 99285 EMERGENCY DEPT VISIT HI MDM: CPT

## 2019-05-01 PROCEDURE — 6360000002 HC RX W HCPCS

## 2019-05-01 PROCEDURE — 83735 ASSAY OF MAGNESIUM: CPT

## 2019-05-01 PROCEDURE — 6360000002 HC RX W HCPCS: Performed by: EMERGENCY MEDICINE

## 2019-05-01 PROCEDURE — 80048 BASIC METABOLIC PNL TOTAL CA: CPT

## 2019-05-01 PROCEDURE — 2500000003 HC RX 250 WO HCPCS

## 2019-05-01 PROCEDURE — 93005 ELECTROCARDIOGRAM TRACING: CPT | Performed by: EMERGENCY MEDICINE

## 2019-05-01 PROCEDURE — 84439 ASSAY OF FREE THYROXINE: CPT

## 2019-05-01 PROCEDURE — 81001 URINALYSIS AUTO W/SCOPE: CPT

## 2019-05-01 PROCEDURE — 82962 GLUCOSE BLOOD TEST: CPT

## 2019-05-01 PROCEDURE — 93010 ELECTROCARDIOGRAM REPORT: CPT | Performed by: INTERNAL MEDICINE

## 2019-05-01 PROCEDURE — 96375 TX/PRO/DX INJ NEW DRUG ADDON: CPT

## 2019-05-01 PROCEDURE — 96365 THER/PROPH/DIAG IV INF INIT: CPT

## 2019-05-01 PROCEDURE — 36415 COLL VENOUS BLD VENIPUNCTURE: CPT

## 2019-05-01 PROCEDURE — 6370000000 HC RX 637 (ALT 250 FOR IP): Performed by: HOSPITALIST

## 2019-05-01 RX ORDER — ISOSORBIDE MONONITRATE 30 MG/1
30 TABLET, EXTENDED RELEASE ORAL DAILY
Status: DISCONTINUED | OUTPATIENT
Start: 2019-05-01 | End: 2019-05-02 | Stop reason: HOSPADM

## 2019-05-01 RX ORDER — SODIUM CHLORIDE 9 MG/ML
INJECTION, SOLUTION INTRAVENOUS CONTINUOUS
Status: DISCONTINUED | OUTPATIENT
Start: 2019-05-01 | End: 2019-05-02

## 2019-05-01 RX ORDER — SODIUM CHLORIDE 0.9 % (FLUSH) 0.9 %
10 SYRINGE (ML) INJECTION EVERY 12 HOURS SCHEDULED
Status: DISCONTINUED | OUTPATIENT
Start: 2019-05-01 | End: 2019-05-02 | Stop reason: HOSPADM

## 2019-05-01 RX ORDER — ONDANSETRON 2 MG/ML
4 INJECTION INTRAMUSCULAR; INTRAVENOUS EVERY 6 HOURS PRN
Status: DISCONTINUED | OUTPATIENT
Start: 2019-05-01 | End: 2019-05-02 | Stop reason: HOSPADM

## 2019-05-01 RX ORDER — AMIODARONE HYDROCHLORIDE 200 MG/1
200 TABLET ORAL DAILY
Status: DISCONTINUED | OUTPATIENT
Start: 2019-05-01 | End: 2019-05-02 | Stop reason: HOSPADM

## 2019-05-01 RX ORDER — HEPARIN SODIUM 5000 [USP'U]/ML
5000 INJECTION, SOLUTION INTRAVENOUS; SUBCUTANEOUS EVERY 8 HOURS SCHEDULED
Status: DISCONTINUED | OUTPATIENT
Start: 2019-05-01 | End: 2019-05-02 | Stop reason: HOSPADM

## 2019-05-01 RX ORDER — SODIUM CHLORIDE 9 MG/ML
INJECTION, SOLUTION INTRAVENOUS CONTINUOUS
Status: DISCONTINUED | OUTPATIENT
Start: 2019-05-01 | End: 2019-05-01

## 2019-05-01 RX ORDER — ATROPINE SULFATE 0.4 MG/ML
0.5 AMPUL (ML) INJECTION ONCE
Status: DISCONTINUED | OUTPATIENT
Start: 2019-05-01 | End: 2019-05-01

## 2019-05-01 RX ORDER — SODIUM POLYSTYRENE SULFONATE 15 G/60ML
15 SUSPENSION ORAL; RECTAL ONCE
Status: COMPLETED | OUTPATIENT
Start: 2019-05-01 | End: 2019-05-01

## 2019-05-01 RX ORDER — ATROPINE SULFATE 0.1 MG/ML
0.5 INJECTION INTRAVENOUS ONCE
Status: COMPLETED | OUTPATIENT
Start: 2019-05-01 | End: 2019-05-01

## 2019-05-01 RX ORDER — LACTOBACILLUS RHAMNOSUS GG 10B CELL
1 CAPSULE ORAL
Status: DISCONTINUED | OUTPATIENT
Start: 2019-05-01 | End: 2019-05-02 | Stop reason: HOSPADM

## 2019-05-01 RX ORDER — SODIUM CHLORIDE 0.9 % (FLUSH) 0.9 %
10 SYRINGE (ML) INJECTION PRN
Status: DISCONTINUED | OUTPATIENT
Start: 2019-05-01 | End: 2019-05-02 | Stop reason: HOSPADM

## 2019-05-01 RX ORDER — DEXTROSE MONOHYDRATE 25 G/50ML
12.5 INJECTION, SOLUTION INTRAVENOUS PRN
Status: DISCONTINUED | OUTPATIENT
Start: 2019-05-01 | End: 2019-05-02 | Stop reason: HOSPADM

## 2019-05-01 RX ORDER — CALCIUM CHLORIDE 100 MG/ML
1 INJECTION INTRAVENOUS; INTRAVENTRICULAR ONCE
Status: COMPLETED | OUTPATIENT
Start: 2019-05-01 | End: 2019-05-01

## 2019-05-01 RX ORDER — ASPIRIN 81 MG/1
81 TABLET ORAL DAILY
Status: DISCONTINUED | OUTPATIENT
Start: 2019-05-01 | End: 2019-05-02 | Stop reason: HOSPADM

## 2019-05-01 RX ORDER — NICOTINE POLACRILEX 4 MG
15 LOZENGE BUCCAL PRN
Status: DISCONTINUED | OUTPATIENT
Start: 2019-05-01 | End: 2019-05-02 | Stop reason: HOSPADM

## 2019-05-01 RX ORDER — AMLODIPINE BESYLATE 5 MG/1
5 TABLET ORAL DAILY
Status: DISCONTINUED | OUTPATIENT
Start: 2019-05-01 | End: 2019-05-02 | Stop reason: HOSPADM

## 2019-05-01 RX ORDER — FERROUS SULFATE 325(65) MG
325 TABLET ORAL 2 TIMES DAILY WITH MEALS
Status: DISCONTINUED | OUTPATIENT
Start: 2019-05-01 | End: 2019-05-02 | Stop reason: HOSPADM

## 2019-05-01 RX ORDER — DEXTROSE MONOHYDRATE 50 MG/ML
100 INJECTION, SOLUTION INTRAVENOUS PRN
Status: DISCONTINUED | OUTPATIENT
Start: 2019-05-01 | End: 2019-05-02 | Stop reason: HOSPADM

## 2019-05-01 RX ORDER — DEXTROSE MONOHYDRATE 25 G/50ML
25 INJECTION, SOLUTION INTRAVENOUS ONCE
Status: COMPLETED | OUTPATIENT
Start: 2019-05-01 | End: 2019-05-01

## 2019-05-01 RX ADMIN — DEXTROSE 50 % IN WATER (D50W) INTRAVENOUS SYRINGE 25 G: at 01:43

## 2019-05-01 RX ADMIN — HEPARIN SODIUM 5000 UNITS: 5000 INJECTION, SOLUTION INTRAVENOUS; SUBCUTANEOUS at 14:59

## 2019-05-01 RX ADMIN — SODIUM CHLORIDE: 9 INJECTION, SOLUTION INTRAVENOUS at 10:54

## 2019-05-01 RX ADMIN — SODIUM CHLORIDE, PRESERVATIVE FREE 10 ML: 5 INJECTION INTRAVENOUS at 22:41

## 2019-05-01 RX ADMIN — SODIUM POLYSTYRENE SULFONATE 15 G: 15 SUSPENSION ORAL; RECTAL at 05:38

## 2019-05-01 RX ADMIN — INSULIN HUMAN 10 UNITS: 100 INJECTION, SOLUTION PARENTERAL at 01:44

## 2019-05-01 RX ADMIN — ASPIRIN 81 MG: 81 TABLET, COATED ORAL at 10:55

## 2019-05-01 RX ADMIN — FERROUS SULFATE TAB 325 MG (65 MG ELEMENTAL FE) 325 MG: 325 (65 FE) TAB at 17:13

## 2019-05-01 RX ADMIN — ISOSORBIDE MONONITRATE 30 MG: 30 TABLET, EXTENDED RELEASE ORAL at 10:55

## 2019-05-01 RX ADMIN — AMIODARONE HYDROCHLORIDE 200 MG: 200 TABLET ORAL at 10:56

## 2019-05-01 RX ADMIN — SODIUM CHLORIDE, PRESERVATIVE FREE 10 ML: 5 INJECTION INTRAVENOUS at 10:56

## 2019-05-01 RX ADMIN — HEPARIN SODIUM 5000 UNITS: 5000 INJECTION, SOLUTION INTRAVENOUS; SUBCUTANEOUS at 22:41

## 2019-05-01 RX ADMIN — Medication 1 CAPSULE: at 11:11

## 2019-05-01 RX ADMIN — FERROUS SULFATE TAB 325 MG (65 MG ELEMENTAL FE) 325 MG: 325 (65 FE) TAB at 10:55

## 2019-05-01 RX ADMIN — HEPARIN SODIUM 5000 UNITS: 5000 INJECTION, SOLUTION INTRAVENOUS; SUBCUTANEOUS at 05:38

## 2019-05-01 RX ADMIN — AMLODIPINE BESYLATE 5 MG: 5 TABLET ORAL at 10:55

## 2019-05-01 RX ADMIN — MAGNESIUM OXIDE TAB 400 MG (241.3 MG ELEMENTAL MG) 400 MG: 400 (241.3 MG) TAB at 10:55

## 2019-05-01 RX ADMIN — ATROPINE SULFATE 0.5 MG: 0.1 INJECTION PARENTERAL at 00:20

## 2019-05-01 RX ADMIN — CALCIUM CHLORIDE 1 G: 100 INJECTION INTRAVENOUS; INTRAVENTRICULAR at 00:16

## 2019-05-01 RX ADMIN — Medication 500 MG: at 05:38

## 2019-05-01 RX ADMIN — Medication 1 CAPSULE: at 17:13

## 2019-05-01 RX ADMIN — Medication 500 MG: at 22:42

## 2019-05-01 RX ADMIN — CALCIUM GLUCONATE 1 G: 98 INJECTION, SOLUTION INTRAVENOUS at 01:02

## 2019-05-01 RX ADMIN — Medication 50 MEQ: at 01:41

## 2019-05-01 RX ADMIN — Medication 500 MG: at 14:56

## 2019-05-01 ASSESSMENT — PAIN SCALES - GENERAL: PAINLEVEL_OUTOF10: 0

## 2019-05-01 NOTE — CONSULTS
 heparin (porcine)  5,000 Units Subcutaneous 3 times per day    vancomycin  500 mg Oral 3 times per day    amiodarone  200 mg Oral Daily     Continuous Infusions:   dextrose      sodium chloride       PRN Meds:.glucose, dextrose, glucagon (rDNA), dextrose, sodium chloride flush, magnesium hydroxide, ondansetron    Allergies:  Lisinopril    Social History:   TOBACCO:   reports that she has never smoked. She has never used smokeless tobacco.  ETOH:   reports that she drinks alcohol.   OCCUPATION:      Family History:       Problem Relation Age of Onset    Diabetes Mother     Heart Disease Mother     High Blood Pressure Mother     Diabetes Father     High Blood Pressure Father     Heart Disease Father            Physical Exam:    Vitals: BP (!) 170/66   Pulse 68   Temp 99.1 °F (37.3 °C) (Oral)   Resp 18   Ht 5' 5\" (1.651 m)   Wt 113 lb (51.3 kg)   SpO2 98%   BMI 18.80 kg/m²   General appearance: in no acute distress, appears stated age  Skin: Skin color, texture, turgor normal. No rashes or lesions  HEENT: normocephalic, atraumatic  Neck: supple, trachea midline  Lungs: clear to auscultation bilaterally, breathing comfortably  Heart[de-identified] regular rate and rhythm, S1, S2 normal,  Abdomen: soft, non-tender; bowel sounds normal; no masses,   Extremities: extremities normal, atraumatic, no cyanosis or edema  Neurologic: Mental status: alert, oriented, interactive, following commands  Psychiatric: mood and affect appropriate    CBC:   Recent Labs     04/30/19  1540 05/01/19  0023   WBC 9.7 7.7   HGB 10.1* 9.2*    378     BMP:    Recent Labs     04/30/19  1540 05/01/19  0023 05/01/19  0130 05/01/19  0345     137 132*  --  136   K 6.3*  6.4* 5.3*  --  4.5   CL 99  98* 97*  --  98*   CO2 23  23 22  --  26   BUN 40*  38* 44*  --  42*   CREATININE 2.8*  2.3* 2.6*  --  2.6*   GLUCOSE 109*  99 106* 117 55*     Hepatic:   Recent Labs     04/30/19  1540 05/01/19  0023   AST 15 15   ALT 12 12 BILITOT 0.3 0.2   ALKPHOS 79 75     Troponin: No results for input(s): TROPONINI in the last 72 hours. BNP: No results for input(s): BNP in the last 72 hours. Lipids:   Recent Labs     04/30/19  1540   CHOL 248*   HDL 63*     ABGs: No results found for: PHART, PO2ART, KZD4YBJ  INR: No results for input(s): INR in the last 72 hours.   -----------------------------------------------------------------      Assessment and Recommendations   - EDUAR on CKD3; cr 2.8 on admission from baseline of 1.6, ddx: prerenal vs ATN, vs medication effect  - hyperkalemia  - bradycardia likely from hyperkalemia  - CAD/AFib    Plan:  Obtain urine lytes  Give NS at 100ml/hr  Get a renal US  Avoid nephrotoxins and renally dose meds        Electronically signed by Lady Kishan DO on 5/1/2019 at 8:26 AM    [address] [facility] [address],  [address]olberto Bravo  [facility], [place] [address]  PHONE: [phone]  FAX: [phone]

## 2019-05-01 NOTE — ED NOTES
Pt alert and talking. EKG showed HR 36. Dr. Prema Snow notified by Jaynee Dakins. This nurse at bedside to start IV.      Dmitriy Zamarripa RN  05/01/19 0030

## 2019-05-01 NOTE — DISCHARGE INSTR - COC
Continuity of Care Form    Patient Name: Sb Villalpando   :  1930  MRN:  [id number]    Admit date:  2019  Discharge date:  ***    Code Status Order: Full Code   Advance Directives:     Admitting Physician:  Dillon Philip MD  PCP: Alma Rosa Harris MD    Discharging Nurse: [facility] Unit/Room#: A  Discharging Unit Phone Number: ***    Emergency Contact:   Extended Emergency Contact Information  Primary Emergency Contact: [address] Phone: [phone]  Relation: Other   needed?  No    Past Surgical History:  Past Surgical History:   Procedure Laterality Date    APPENDECTOMY      CATARACT REMOVAL WITH IMPLANT Bilateral     Cataracs    COLONOSCOPY  14    severe fixed colon    HYSTERECTOMY, TOTAL ABDOMINAL      SALPINGO-OOPHORECTOMY         Immunization History:   Immunization History   Administered Date(s) Administered    Influenza Virus Vaccine 10/08/2014       Active Problems:  Patient Active Problem List   Diagnosis Code    Essential hypertension I10    Gait disturbance R26.9    GIB (gastrointestinal bleeding) K92.2    Type II diabetes mellitus ([facility] E11.9    Colon cancer screening Z12.11    Anemia, chronic disease D63.8    PAF (paroxysmal atrial fibrillation) (HCC) I48.0    CAD S/P percutaneous coronary angioplasty I25.10, Z98.61    CKD (chronic kidney disease), stage IV (HCC) N18.4    Chronic anticoagulation Z79.01    Clostridium difficile colitis A04.72    Bradycardia R00.1       Isolation/Infection:   Isolation          No Isolation            Nurse Assessment:  Last Vital Signs: BP (!) 139/50   Pulse 73   Temp 98.2 °F (36.8 °C)   Resp 10   Ht 5' 5\" (1.651 m)   Wt 113 lb (51.3 kg)   SpO2 98%   BMI 18.80 kg/m²     Last documented pain score (0-10 scale):    Last Weight:   Wt Readings from Last 1 Encounters:   19 113 lb (51.3 kg)     Mental Status:  {IP PT MENTAL STATUS:}    IV Access:  [facility] IV Respiratory Treatments: ***  Oxygen Therapy:  {Therapy; copd oxygen:67909}  Ventilator:    { CC Vent GEXC:490164425}    Rehab Therapies: {THERAPEUTIC INTERVENTION:1625608391}  Weight Bearing Status/Restrictions: 50[address] CC Weight Bearin}  Other Medical Equipment (for information only, NOT a DME order):  {EQUIPMENT:351703417}  Other Treatments: ***      Prognosis: {Prognosis:9821182340}    Condition at Discharge: [address] Patient Condition:997195280}    Rehab Potential (if transferring to Rehab): {Prognosis:7598013609}    Recommended Labs or Other Treatments After Discharge: ***    Physician Certification: I certify the above information and transfer of Amado Paul  is necessary for the continuing treatment of the diagnosis listed and that she requires {Admit to Appropriate Level of Care:71244} for {GREATER/LESS:874729434} 30 days.      Update Admission H&P: {CHP DME Changes in BNQRQ:687016013}    PHYSICIAN SIGNATURE:  {Esignature:069721936}

## 2019-05-01 NOTE — CARE COORDINATION
.CM met with pt for d/c planning. Introduced self and updated white board. Pt lives in [address] [place] [address] at [facility]. [employer] provides her transportation. She has a PCP, has insurance, and is able to afford medication. Pt  Has a raised toilet, grab bars, and emergency pull cords. She has [facility]. She states that she was recently d/c'd from skilled at [facility]. She states that she wants to go to [facility] at [facility] for rehab when she is d/c'd. Referral made to Matt. Pt has Medicare and will not require a pre-cert. She has had a 3 day in pt stay. Matt informed CM that they will need PT notes, which have been requested via WB. D/c plan is [facility]. Pt may go whenever she is medically ready. NO PRE-CERT REQUIRED. Notified Dr Amada Menchaca via PS.  TE

## 2019-05-01 NOTE — CONSULTS
[address], [address] [facility]                                  CONSULTATION    PATIENT NAME: Molina Lozada                     :        1930  MED REC NO:   [id number]                          ROOM:       3111  ACCOUNT NO:   [de-identified]                           ADMIT DATE: 2019  PROVIDER:     Manjit Landeros MD    CONSULT DATE:  2019    INDICATION:  Bradycardia. HISTORY OF PRESENT ILLNESS:  This is an 80-year-old female patient who  was asked by Dr. Amado Figueroa to come into the hospital yesterday. She was  found to have hyperkalemia present on the blood work, so the patient  came to the hospital.  She was also found to have junctional rhythm  present. Her heart rate was in the 40s, but she was not symptomatic for  this. The patient has a kidney function abnormal, but it has elevated. She  has AFib for which she is placed on amiodarone and beta blockers were  stopped. Right now, she is awake, alert, looks comfortable. She is not  in any distress. She is in sinus rhythm, the rate is in the 70s  present. Her potassium is also being corrected. It appeared that she was given atropine in the ER and when she came in  her heart rate was 36. She had been on amiodarone and Cardizem for  paroxysmal atrial fibrillation. She was taken off Eliquis. She had  multiple hospital admissions since last time she was here. She had  pneumonia also. The patient had a Holter done back in 2019. At that time, she was  found to have abnormal Holter rhythm, underlying sinus with a several  episodes of atrial fibrillation with rapid ventricular response and  occasional PACs and PVCs noted. She had an echo done back in 2019  also. An echo shows LV function was preserved. The patient was here recently in the hospital.  She was here for long  time 45 days. She has finally got better. She was discharged home. She has had history of melanoma, diabetes, hypertension, hyperlipidemia  present, history of C. diff colitis and has been on antibiotics. She  has a history of coronary artery disease. She had a heart  catheterization done in 2015. Left main was patent. LAD was occluded  which was stented. Circ was stented. RCA was stable. She had  peripheral vascular disease and left anterior tibial artery balloon  angioplasty was performed at that time. Her heart cath was in 2015,  left main is patent. Right was decreased, 50% stenosis. Circ 80%  stenosis which was stented and LAD was also stented and then she had a  peripheral angiogram done with the left anterior tibial artery  intervention. PAST SURGICAL HISTORY:  Hysterectomy. SOCIAL HISTORY:  She does not smoke and does not drink. ALLERGIES:  LISINOPRIL causes anaphylaxis. MEDICATIONS AT HOME:  She was on Imdur 30 mg a day, iron sulfate,  amiodarone 200 mg a day, Norvasc 5 mg a day, Cardizem 60 mg t.i.d., and  mag oxide. PHYSICAL EXAMINATION:  GENERAL:  The patient is awake, alert, answers questions, not in acute  distress. VITAL SIGNS:  Temperature is afebrile, pulse is 70, sinus rhythm  present. HEENT:  Head is normocephalic and atraumatic. Pupils are equal and  reactive to light. CHEST:  Equal expansion. LUNGS:  Clear to auscultation. No wheezing or rhonchi. HEART:  Regular rhythm. ABDOMEN:  Soft and nontender. Bowel sounds are present. No  hepatosplenomegaly or guarding appreciated. EXTREMITIES:  No cyanosis or clubbing noted. NEUROLOGIC:  Cranial nerves II through XII are grossly intact. The patient was recently here, she had colitis. She was _____ oral  vancomycin. She has a history of having paroxysmal atrial fibrillation  with nonsustained ventricular tachycardia for which she was placed on  amiodarone, anticoagulation.   Her medications last time, she was on  amiodarone 200 mg a day, isosorbide, and Cardizem 60 mg t.i.d.    LABORATORY DATA:  Her labs are her potassium is improved with 6.4 down  to 4.5 and creatinine is 2.6. Her creatinine was 1.6 not too long ago  which was acute renal injury present. Her mag is 1.9. Troponins are  negative. Total cholesterol is 248. LFTs normal.  CBC is within normal  range. IMPRESSION:  This is an 15-year-old female patient who had a very  prolonged hospital course with colitis finally recovered, she went home  not too long ago. She came back with hyperkalemia, found to have  junctional rhythm at the time present. She had a history of having  nonsustained ventricular tachycardia and paroxysmal atrial fibrillation  present. She has been on anticoagulation also. At this time, I would  observe her. I think we will make further recommendation based on the  hospital course. If she remains symptomatic, I think we will consider  doing a pacer. Otherwise, I would just keep her on amiodarone and keep  her on calcium channel blocker based on that. She may need pacer also,  but we will reevaluate that. I will get a stress test on her also.   She  has noted to have coronary artery disease and multivessel angioplasty in  the past.        Daya West MD    D: 05/01/2019 7:51:36       T: 05/01/2019 9:43:41     [email or web address]  Job#: [id number]     Doc#: [id number]    CC:

## 2019-05-01 NOTE — ED PROVIDER NOTES
file     Inability: Not on file    Transportation needs:     Medical: Not on file     Non-medical: Not on file   Tobacco Use    Smoking status: Never Smoker    Smokeless tobacco: Never Used   Substance and Sexual Activity    Alcohol use: Yes     Comment: socially only    Drug use: No    Sexual activity: Not on file   Lifestyle    Physical activity:     Days per week: Not on file     Minutes per session: Not on file    Stress: Not on file   Relationships    Social connections:     Talks on phone: Not on file     Gets together: Not on file     Attends Advent service: Not on file     Active member of club or organization: Not on file     Attends meetings of clubs or organizations: Not on file     Relationship status: Not on file    Intimate partner violence:     Fear of current or ex partner: Not on file     Emotionally abused: Not on file     Physically abused: Not on file     Forced sexual activity: Not on file   Other Topics Concern    Not on file   Social History Narrative    Not on file     Current Facility-Administered Medications   Medication Dose Route Frequency Provider Last Rate Last Dose    glucose (GLUTOSE) 40 % oral gel 15 g  15 g Oral PRN Antwan England MD        dextrose 50 % solution 12.5 g  12.5 g Intravenous PRN Antwan England MD        glucagon (rDNA) injection 1 mg  1 mg Intramuscular PRN Antwan England MD        dextrose 5 % solution  100 mL/hr Intravenous PRWENDY England MD         Current Outpatient Medications   Medication Sig Dispense Refill    isosorbide mononitrate (IMDUR) 30 MG extended release tablet Take 1 tablet by mouth daily 30 tablet 0    ferrous sulfate 325 (65 Fe) MG tablet Take 1 tablet by mouth 2 times daily 180 tablet 1    Probiotic Product (PROBIOTIC-10 PO) Take by mouth      amiodarone (CORDARONE) 200 MG tablet Take 1 tablet by mouth daily 30 tablet 0    amLODIPine (NORVASC) 5 MG tablet Take 1 tablet by mouth daily 30 tablet 0    diltiazem (CARDIZEM) 60 MG tablet Take 1 tablet by mouth every 8 hours 120 tablet 0    magnesium oxide (MAG-OX) 400 (241.3 Mg) MG TABS tablet Take 1 tablet by mouth daily 30 tablet 0    vancomycin (VANCOCIN) 50 mg/mL oral solution Take 10 mLs by mouth every 8 hours 1 Bottle 0    lactobacillus (CULTURELLE) capsule Take 1 capsule by mouth 3 times daily 90 capsule 1    aspirin 81 MG EC tablet Take 1 tablet by mouth daily 30 tablet 0    Cholecalciferol (VITAMIN D3) 5000 units TABS Take by mouth daily       Allergies   Allergen Reactions    Lisinopril Anaphylaxis     Nursing Notes Reviewed    ROS:  At least 10 systems reviewed and otherwise negative except as in the [place]. Physical Exam:  ED Triage Vitals   Enc Vitals Group      BP       Pulse       Resp       Temp       Temp src       SpO2       Weight       Height       Head Circumference       Peak Flow       Pain Score       Pain Loc       Pain Edu? Excl. in [address]? My pulse oximetry interpretation is which is within the normal range    GENERAL APPEARANCE: Awake and alert. Cooperative. No acute distress. HEAD: Normocephalic. Atraumatic. EYES: EOM's grossly intact. Sclera anicteric. ENT: Mucous membranes are moist. Tolerates saliva. No trismus. NECK: Supple. No meningismus. Trachea midline. HEART: RRR. Radial pulses 2+. LUNGS: Respirations unlabored. CTAB  ABDOMEN: Soft. Non-tender. No guarding or rebound. EXTREMITIES: No acute deformities. SKIN: Warm and dry. NEUROLOGICAL: No gross facial drooping. Moves all 4 extremities spontaneously. PSYCHIATRIC: Normal mood.     I have reviewed and interpreted all of the currently available lab results from this visit (if applicable):  Results for orders placed or performed during the hospital encounter of 05/01/19   CBC Auto Differential   Result Value Ref Range    WBC 7.7 4.0 - 10.5 K/CU MM    RBC 3.13 (L) 4.2 - 5.4 M/CU MM    Hemoglobin 9.2 (L) 12.5 - 16.0 GM/DL    Hematocrit 30.6 (L) 37 - 47 %    MCV 97.8 78 - 100 FL    MCH 29.4 27 - 31 PG    MCHC 30.1 (L) 32.0 - 36.0 %    RDW 14.6 11.7 - 14.9 %    Platelets 007 812 - 555 K/CU MM    MPV 8.7 7.5 - 11.1 FL    Differential Type AUTOMATED DIFFERENTIAL     Segs Relative 55.3 36 - 66 %    Lymphocytes % 29.1 24 - 44 %    Monocytes % 8.0 (H) 0 - 4 %    Eosinophils % 6.4 (H) 0 - 3 %    Basophils % 0.9 0 - 1 %    Segs Absolute 4.2 K/CU MM    Lymphocytes # 2.2 K/CU MM    Monocytes # 0.6 K/CU MM    Eosinophils # 0.5 K/CU MM    Basophils # 0.1 K/CU MM    Nucleated RBC % 0.0 %    Total Nucleated RBC 0.0 K/CU MM    TRC 0.0313 K/CU MM    Total Immature Neutrophil 0.02 K/CU MM    Immature Neutrophil % 0.3 0 - 0.43 %   CMP   Result Value Ref Range    Sodium 132 (L) 135 - 145 MMOL/L    Potassium 5.3 (H) 3.5 - 5.1 MMOL/L    Chloride 97 (L) 99 - 110 mMol/L    CO2 22 21 - 32 MMOL/L    BUN 44 (H) 6 - 23 MG/DL    CREATININE 2.6 (H) 0.6 - 1.1 MG/DL    Glucose 106 (H) 70 - 99 MG/DL    Calcium 9.1 8.3 - 10.6 MG/DL    Alb 3.7 3.4 - 5.0 GM/DL    Total Protein 7.2 6.4 - 8.2 GM/DL    Total Bilirubin 0.2 0.0 - 1.0 MG/DL    ALT 12 10 - 40 U/L    AST 15 15 - 37 IU/L    Alkaline Phosphatase 75 40 - 129 IU/L    GFR Non- 17 (L) >60 mL/min/1.73m2    GFR  21 (L) >60 mL/min/1.73m2    Anion Gap 13 4 - 16   Troponin   Result Value Ref Range    Troponin T 0.010 (H) <0.01 NG/ML   Magnesium   Result Value Ref Range    Magnesium 1.9 1.8 - 2.4 mg/dl   POCT Glucose   Result Value Ref Range    Glucose 117 mg/dL    QC OK?  yes    POCT Glucose   Result Value Ref Range    POC Glucose 117 (H) 70 - 99 MG/DL   EKG 12 Lead   Result Value Ref Range    Ventricular Rate 42 BPM    Atrial Rate 94 BPM    QRS Duration 86 ms    Q-T Interval 488 ms    QTc Calculation (Bazett) 407 ms    R Axis -31 degrees    T Axis 53 degrees    Diagnosis       Junctional bradycardia  Left axis deviation  Septal infarct (cited on or before 10-HARPREET-2019)  Abnormal ECG  When compared with ECG of 01-MAY-2019

## 2019-05-01 NOTE — H&P
Department of Internal Medicine  Hospitalist  Attending History and Physical      CHIEF COMPLAINT:  Abnormal labs    Reason for Admission:  bradycardia    History Obtained From:  patient    HISTORY OF PRESENT ILLNESS:      The patient is a 80 y.o. female with significant past medical history of PAF, recent c.diff on oral vanc, CKD received a call from her PCP stating her potassium was high 6.4 and to go to the hospital. She presented to the hospital and noted to have bradycardia in the 30s and EKG showed junctional bradycardia 36. She was given atropine which improved HR. She is on amiodarone and cardiazem. For he PAF she was taken off eliquis. She was given treatment for hyperkalemia. Pt feels tired but no chest pain, shortness of breath, dizziness or palpitations. She is still taking oral vanc but has no diarrhea. Past Medical History:        Diagnosis Date    Anemia, chronic disease     Angioedema 08/2017    lisinopril    Asthma     childhood    CAD S/P percutaneous coronary angioplasty 06/2015    4 stents; Dr Sandy Carmen cardiologist    Chronic anticoagulation     PAF    CKD (chronic kidney disease), stage IV ([address]     Dr Marie Greenberg    Clostridium difficile colitis 1/2019, 3/2019, 4/2019    Diverticulitis 04/2019    Hyperlipidemia     Hypertension     Melanoma ([address] 2014    right arm    PAF (paroxysmal atrial fibrillation) ([address]     Type II diabetes mellitus ([address] 2009     Past Surgical History:        Procedure Laterality Date    APPENDECTOMY  1999    CATARACT REMOVAL WITH IMPLANT Bilateral     Cataracs    COLONOSCOPY  6-24-14    severe fixed colon    HYSTERECTOMY, TOTAL ABDOMINAL  1999    SALPINGO-OOPHORECTOMY  1999     IMedications Prior to Admission:    No current facility-administered medications on file prior to encounter.       Current Outpatient Medications on File Prior to Encounter   Medication Sig Dispense Refill    isosorbide mononitrate (IMDUR) 30 MG extended release tablet Take 1 tablet by mouth daily 30 tablet 0    ferrous sulfate 325 (65 Fe) MG tablet Take 1 tablet by mouth 2 times daily 180 tablet 1    Probiotic Product (PROBIOTIC-10 PO) Take by mouth      amiodarone (CORDARONE) 200 MG tablet Take 1 tablet by mouth daily 30 tablet 0    amLODIPine (NORVASC) 5 MG tablet Take 1 tablet by mouth daily 30 tablet 0    diltiazem (CARDIZEM) 60 MG tablet Take 1 tablet by mouth every 8 hours 120 tablet 0    magnesium oxide (MAG-OX) 400 (241.3 Mg) MG TABS tablet Take 1 tablet by mouth daily 30 tablet 0    vancomycin (VANCOCIN) 50 mg/mL oral solution Take 10 mLs by mouth every 8 hours 1 Bottle 0    lactobacillus (CULTURELLE) capsule Take 1 capsule by mouth 3 times daily 90 capsule 1    aspirin 81 MG EC tablet Take 1 tablet by mouth daily 30 tablet 0    Cholecalciferol (VITAMIN D3) 5000 units TABS Take by mouth daily           Allergies:  Lisinopril    Social History:   Social History     Socioeconomic History    Marital status:       Spouse name: Not on file    Number of children: 3    Years of education: Not on file    Highest education level: Not on file   Occupational History    Not on file   Social Needs    Financial resource strain: Not on file    Food insecurity:     Worry: Not on file     Inability: Not on file    Transportation needs:     Medical: Not on file     Non-medical: Not on file   Tobacco Use    Smoking status: Never Smoker    Smokeless tobacco: Never Used   Substance and Sexual Activity    Alcohol use: Yes     Comment: socially only    Drug use: No    Sexual activity: Not on file   Lifestyle    Physical activity:     Days per week: Not on file     Minutes per session: Not on file    Stress: Not on file   Relationships    Social connections:     Talks on phone: Not on file     Gets together: Not on file     Attends Taoism service: Not on file     Active member of club or organization: Not on file     Attends meetings of clubs or organizations: Not on file Relationship status: Not on file    Intimate partner violence:     Fear of current or ex partner: Not on file     Emotionally abused: Not on file     Physically abused: Not on file     Forced sexual activity: Not on file   Other Topics Concern    Not on file   Social History Narrative    Not on file       Family History:   Family History   Problem Relation Age of Onset    Diabetes Mother     Heart Disease Mother     High Blood Pressure Mother     Diabetes Father     High Blood Pressure Father     Heart Disease Father        REVIEW OF SYSTEMS:  CONSTITUTIONAL:  positive for  fatigue  EYES:  negative  HEENT:  negative  RESPIRATORY:  negative  CARDIOVASCULAR:  negative  GASTROINTESTINAL:  negative  ENDOCRINE:  negative  MUSCULOSKELETAL:  negative  NEUROLOGICAL:  negative  BEHAVIOR/PSYCH:  negative  PHYSICAL EXAM:    Vitals:  BP (!) 164/52   Pulse 63   Temp 97.5 °F (36.4 °C) (Oral)   Resp 19   Ht 5' 5\" (1.651 m)   Wt 113 lb (51.3 kg)   SpO2 98%   BMI 18.80 kg/m²     CONSTITUTIONAL:  fatigued  EYES:  Lids and lashes normal, pupils equal, round and reactive to light, extra ocular muscles intact, sclera clear, conjunctiva normal  ENT:  Normocephalic, without obvious abnormality, atraumatic, sinuses nontender on palpation, external ears without lesions, oral pharynx with moist mucus membranes, tonsils without erythema or exudates, gums normal and good dentition. NECK:  Supple, symmetrical, trachea midline, no adenopathy, thyroid symmetric, not enlarged and no tenderness, skin normal  LUNGS:  No increased work of breathing, good air exchange, clear to auscultation bilaterally, no crackles or wheezing  CARDIOVASCULAR:  Regular rate, rtyhm  ABDOMEN:  No scars, normal bowel sounds, soft, non-distended, non-tender, no masses palpated, no hepatosplenomegally  MUSCULOSKELETAL:  there is no redness, warmth, or swelling of the joints  NEUROLOGIC:  Awake, alert, oriented to name, place and time.   Cranial nerves

## 2019-05-01 NOTE — ED NOTES
Dr. Yessenia Gonzalez at bedside gave verbal order for 1g Calcium Chloride and 0.5mg Atropine. Pt placed on Zol pads and monitor. HR low 32, up to 37.       Deni Elizabeth RN  05/01/19 5852

## 2019-05-02 VITALS
OXYGEN SATURATION: 92 % | RESPIRATION RATE: 21 BRPM | HEIGHT: 65 IN | DIASTOLIC BLOOD PRESSURE: 57 MMHG | BODY MASS INDEX: 19.83 KG/M2 | TEMPERATURE: 98.7 F | WEIGHT: 119 LBS | HEART RATE: 71 BPM | SYSTOLIC BLOOD PRESSURE: 153 MMHG

## 2019-05-02 LAB
ANION GAP SERPL CALCULATED.3IONS-SCNC: 11 MMOL/L (ref 4–16)
BUN BLDV-MCNC: 30 MG/DL (ref 6–23)
CALCIUM SERPL-MCNC: 8.6 MG/DL (ref 8.3–10.6)
CHLORIDE BLD-SCNC: 105 MMOL/L (ref 99–110)
CO2: 23 MMOL/L (ref 21–32)
CREAT SERPL-MCNC: 2.2 MG/DL (ref 0.6–1.1)
GFR AFRICAN AMERICAN: 25 ML/MIN/1.73M2
GFR NON-AFRICAN AMERICAN: 21 ML/MIN/1.73M2
GLUCOSE BLD-MCNC: 91 MG/DL (ref 70–99)
GLUCOSE BLD-MCNC: 91 MG/DL (ref 70–99)
HCT VFR BLD CALC: 27.7 % (ref 37–47)
HEMOGLOBIN: 8.5 GM/DL (ref 12.5–16)
MCH RBC QN AUTO: 30 PG (ref 27–31)
MCHC RBC AUTO-ENTMCNC: 30.7 % (ref 32–36)
MCV RBC AUTO: 97.9 FL (ref 78–100)
PDW BLD-RTO: 14.4 % (ref 11.7–14.9)
PLATELET # BLD: 279 K/CU MM (ref 140–440)
PMV BLD AUTO: 8.4 FL (ref 7.5–11.1)
POTASSIUM SERPL-SCNC: 4.2 MMOL/L (ref 3.5–5.1)
RBC # BLD: 2.83 M/CU MM (ref 4.2–5.4)
SODIUM BLD-SCNC: 139 MMOL/L (ref 135–145)
WBC # BLD: 5.8 K/CU MM (ref 4–10.5)

## 2019-05-02 PROCEDURE — 82962 GLUCOSE BLOOD TEST: CPT

## 2019-05-02 PROCEDURE — 6360000002 HC RX W HCPCS: Performed by: HOSPITALIST

## 2019-05-02 PROCEDURE — 80048 BASIC METABOLIC PNL TOTAL CA: CPT

## 2019-05-02 PROCEDURE — 36415 COLL VENOUS BLD VENIPUNCTURE: CPT

## 2019-05-02 PROCEDURE — 6370000000 HC RX 637 (ALT 250 FOR IP): Performed by: INTERNAL MEDICINE

## 2019-05-02 PROCEDURE — 6370000000 HC RX 637 (ALT 250 FOR IP): Performed by: HOSPITALIST

## 2019-05-02 PROCEDURE — 85027 COMPLETE CBC AUTOMATED: CPT

## 2019-05-02 RX ADMIN — ASPIRIN 81 MG: 81 TABLET, COATED ORAL at 10:38

## 2019-05-02 RX ADMIN — FERROUS SULFATE TAB 325 MG (65 MG ELEMENTAL FE) 325 MG: 325 (65 FE) TAB at 10:38

## 2019-05-02 RX ADMIN — Medication 1 CAPSULE: at 10:38

## 2019-05-02 RX ADMIN — ISOSORBIDE MONONITRATE 30 MG: 30 TABLET, EXTENDED RELEASE ORAL at 10:38

## 2019-05-02 RX ADMIN — Medication 500 MG: at 06:26

## 2019-05-02 RX ADMIN — HEPARIN SODIUM 5000 UNITS: 5000 INJECTION, SOLUTION INTRAVENOUS; SUBCUTANEOUS at 06:26

## 2019-05-02 RX ADMIN — Medication 1 CAPSULE: at 14:48

## 2019-05-02 RX ADMIN — AMLODIPINE BESYLATE 5 MG: 5 TABLET ORAL at 10:39

## 2019-05-02 RX ADMIN — AMIODARONE HYDROCHLORIDE 200 MG: 200 TABLET ORAL at 10:38

## 2019-05-02 RX ADMIN — MAGNESIUM OXIDE TAB 400 MG (241.3 MG ELEMENTAL MG) 400 MG: 400 (241.3 MG) TAB at 10:38

## 2019-05-02 ASSESSMENT — PAIN SCALES - GENERAL: PAINLEVEL_OUTOF10: 0

## 2019-05-02 NOTE — PROGRESS NOTES
Daily Progress Note    patient is awake alert no chest pain  Heart rate is stable   No  Further bradycardia noted  Watch for now  She may need a pacer in future but for now observe  She may had bradycardia due to hyperkalemia  Acute renal injury improving  Follow up with renal   Will follow up with her in few weeks        Objective:   BP (!) 153/57   Pulse 71   Temp 98.7 °F (37.1 °C)   Resp 21   Ht 5' 5\" (1.651 m)   Wt 119 lb (54 kg)   SpO2 92%   BMI 19.80 kg/m²       Intake/Output Summary (Last 24 hours) at 5/2/2019 1412  Last data filed at 5/1/2019 1714  Gross per 24 hour   Intake 735 ml   Output --   Net 735 ml       Medications:   Scheduled Meds:   amLODIPine  5 mg Oral Daily    aspirin  81 mg Oral Daily    ferrous sulfate  325 mg Oral BID WC    isosorbide mononitrate  30 mg Oral Daily    lactobacillus  1 capsule Oral TID WC    magnesium oxide  400 mg Oral Daily    sodium chloride flush  10 mL Intravenous 2 times per day    heparin (porcine)  5,000 Units Subcutaneous 3 times per day    vancomycin  500 mg Oral 3 times per day    amiodarone  200 mg Oral Daily      Infusions:   dextrose        PRN Meds:  glucose, dextrose, glucagon (rDNA), dextrose, sodium chloride flush, magnesium hydroxide, ondansetron       Physical Exam:  Vitals:    05/02/19 1034   BP: (!) 153/57   Pulse: 71   Resp: 21   Temp: 98.7 °F (37.1 °C)   SpO2: 92%        General: awake alert   Chest: Nontender  Cardiac: sinus   Lungs:Clear to auscultation and percussion. Abdomen: Soft, NT, ND, +BS  Extremities: no edema   Vascular:  Equal 2+ peripheral pulses.         Lab Data:  CBC:   Recent Labs     04/30/19  1540 05/01/19  0023 05/02/19  0504   WBC 9.7 7.7 5.8   HGB 10.1* 9.2* 8.5*   HCT 31.1* 30.6* 27.7*   MCV 93.1 97.8 97.9    378 279     BMP:   Recent Labs     04/30/19  1540 05/01/19  0023 05/01/19  0345 05/02/19  0504     137 132* 136 139   K 6.3*  6.4* 5.3* 4.5 4.2   CL 99  98* 97* 98* 105   CO2 23  23 22 26 23   PHOS 5.1*  --   --   --    BUN 40*  38* 44* 42* 30*   CREATININE 2.8*  2.3* 2.6* 2.6* 2.2*     LIVER PROFILE:   Recent Labs     04/30/19  1540 05/01/19  0023   AST 15 15   ALT 12 12   BILITOT 0.3 0.2   ALKPHOS 79 75     PT/INR: No results for input(s): PROTIME, INR in the last 72 hours. APTT: No results for input(s): APTT in the last 72 hours. BNP:  No results for input(s): BNP in the last 72 hours.       Assessment:  Patient Active Problem List    Diagnosis Date Noted    Bradycardia 05/01/2019    Colon cancer screening 04/29/2019    Anemia, chronic disease     PAF (paroxysmal atrial fibrillation) (HCC)     CAD S/P percutaneous coronary angioplasty     CKD (chronic kidney disease), stage IV (HCC)     Chronic anticoagulation     Clostridium difficile colitis     GIB (gastrointestinal bleeding) 03/08/2019    Gait disturbance 01/06/2019    Essential hypertension 01/01/2019    Type II diabetes mellitus ([facility] 01/01/2009       Grace Camilo MD 5/2/2019 2:12 PM

## 2019-05-02 NOTE — PROGRESS NOTES
Dr. Chavez Free in to see pt and wanted this nurse to verify discharge plans with Dr. Delaney Dolan. Spoke with Dr. Delaney Dolan and questioned pt going home on Cardizem since she was not on while here and came in with some bradycardia. Dr. Delaney Dolan confirmed discharge plans and is discontinuing the cardizem. Pt states she wants to go home to her independent living. Pt ambulating well. Updated  Tammy Cuba. She is to set up transportation for this pt.

## 2019-05-02 NOTE — PLAN OF CARE
Problem: Fluid Volume:  Goal: Ability to achieve a balanced intake and output will improve  Description  Ability to achieve a balanced intake and output will improve  Outcome: Ongoing

## 2019-05-02 NOTE — DISCHARGE SUMMARY
Discharge Summary    Patient ID   Vanda Miller   4/29/1930  [id number]    Primary Care:   Rubi Hammonds MD    Admit date: 5/1/2019   Discharge date: 5/2/2019    Medical Record number: [id number]   Admitting Physician: Niharika Lombardi MD   Discharge Physician: Kj Castellanos MD    Consultants: cardiology    Procedures: none    Discharge Diagnoses:      Patient Active Problem List   Diagnosis Code    Essential hypertension I10    Gait disturbance R26.9    GIB (gastrointestinal bleeding) K92.2    Type II diabetes mellitus ([facility] E11.9    Colon cancer screening Z12.11    Anemia, chronic disease D63.8    PAF (paroxysmal atrial fibrillation) ([facility] I48.0    CAD S/P percutaneous coronary angioplasty I25.10, Z98.61    CKD (chronic kidney disease), stage IV ([facility] N18.4    Chronic anticoagulation Z79.01    Clostridium difficile colitis A04.72    Bradycardia R00.1         Hospital Course:  Ms. Vandana Flores is 80 year. Female who presented to the emergency department due to being sent over by her primary care physician due to her Pap potassium being 6.4 patient was instructed to present to the emergency department for evaluation and treatment. It was found in the emergency department to have a slow junctional bradycardia which was improved with atropine and the patient was admitted to hospitalist service with cardiology in consultation for the paroxysmal atrial fib along with bradycardia. Patient improved significantly with medical management and at this time she is stable per renal and hospitalist service to be discharged to home and to follow-up with her primary care provider and outpatient setting and also with nephrology if indicated. She denies chest pain, palpitations or shortness of breath she is eating and drinking without abdominal pain, nausea or vomiting. she normally lives in [facility] in assisted living section.     Physical Exam:   BP (!) 153/57   Pulse 71   Temp 98.7 °F (37.1 °C)   Resp 21 Ht 5' 5\" (1.651 m)   Wt 119 lb (54 kg)   SpO2 92%   BMI 19.80 kg/m²   Neck: no JVD  Lungs: equal BS, clear   CV: RRR, normal S1S2, no significant murmur  Abdomen: soft, nontender, normally active BS, no masses or tenderness  Extremities: no edema or cords  Neurologic: alert, oriented, no focal CN or motor deficit        Medications: see computerized discharge medication list     Medication List      CONTINUE taking these medications    amiodarone 200 MG tablet  Commonly known as:  CORDARONE  Take 1 tablet by mouth daily     amLODIPine 5 MG tablet  Commonly known as:  NORVASC  Take 1 tablet by mouth daily     aspirin 81 MG EC tablet  Take 1 tablet by mouth daily     ferrous sulfate 325 (65 Fe) MG tablet  Take 1 tablet by mouth 2 times daily     isosorbide mononitrate 30 MG extended release tablet  Commonly known as:  IMDUR  Take 1 tablet by mouth daily     lactobacillus capsule  Take 1 capsule by mouth 3 times daily     magnesium oxide 400 (241.3 Mg) MG Tabs tablet  Commonly known as:  MAG-OX  Take 1 tablet by mouth daily     PROBIOTIC-10 PO     Vitamin D3 5000 units Tabs        STOP taking these medications    diltiazem 60 MG tablet  Commonly known as:  CARDIZEM     vancomycin 50 mg/mL oral solution  Commonly known as:  VANCOCIN          Patient Instructions: Resume home meds and any changes while in the hospital      Discharged Condition: good  Disposition: [facility]  Activity: activity as tolerated  Diet: regular diet  Wound Care: none needed    Follow-up: Dr. Jillian Gomez in 1-2 weeks        Electronically signed by Luis Walker MD on 5/2/2019 at 1:23 PM    Time spent on discharge 20 minutes

## 2019-05-02 NOTE — CARE COORDINATION
CM called [facility]/[phone] to notify of dc. Per  Yamile Goldman, they will be putting patient in AL until a bed on the skilled unit opens up. Yamile Jones is going to call this CM back once arrangements are made on her end to bring patient to AL. CM will arrange transport and continue with dc once I hear back from [facility]. 1057  Informed by RN that [place] apartment and not skilled. CM called Matt to update, voices understanding. 1110  CM arranged transport with [facility] for WC  at 1500. RN updated. 200  CM called [facility], patient is active with them and they will need [facility] order to resume services. PS message sent to Dr. Bogdan Wyman to notify. 82 Grace Fowler called [facility] to notify of discharge. Faxed facesheet, [facility] [address] order and AVS to [facility].

## 2019-05-07 RX ORDER — AMLODIPINE BESYLATE 5 MG/1
5 TABLET ORAL DAILY
Qty: 30 TABLET | Refills: 2 | Status: SHIPPED | OUTPATIENT
Start: 2019-05-07 | End: 2019-07-17 | Stop reason: SDUPTHER

## 2019-05-07 RX ORDER — AMIODARONE HYDROCHLORIDE 200 MG/1
200 TABLET ORAL DAILY
Qty: 30 TABLET | Refills: 2 | Status: SHIPPED | OUTPATIENT
Start: 2019-05-07 | End: 2019-07-17 | Stop reason: SDUPTHER

## 2019-05-24 ENCOUNTER — HOSPITAL ENCOUNTER (OUTPATIENT)
Age: 84
Discharge: HOME | End: 2019-05-24
Payer: MEDICARE

## 2019-05-24 ENCOUNTER — OFFICE VISIT (OUTPATIENT)
Dept: INTERNAL MEDICINE CLINIC | Age: 84
End: 2019-05-24
Payer: MEDICARE

## 2019-05-24 VITALS
WEIGHT: 116 LBS | DIASTOLIC BLOOD PRESSURE: 56 MMHG | HEART RATE: 62 BPM | BODY MASS INDEX: 19.3 KG/M2 | SYSTOLIC BLOOD PRESSURE: 128 MMHG | OXYGEN SATURATION: 98 % | RESPIRATION RATE: 14 BRPM

## 2019-05-24 DIAGNOSIS — Z87.19 HISTORY OF GASTROINTESTINAL BLEEDING: ICD-10-CM

## 2019-05-24 DIAGNOSIS — E03.9 HYPOTHYROIDISM, UNSPECIFIED TYPE: ICD-10-CM

## 2019-05-24 DIAGNOSIS — A04.72 CLOSTRIDIUM DIFFICILE COLITIS: ICD-10-CM

## 2019-05-24 DIAGNOSIS — R79.89 ELEVATED TSH: ICD-10-CM

## 2019-05-24 DIAGNOSIS — R19.7 DIARRHEA, UNSPECIFIED TYPE: ICD-10-CM

## 2019-05-24 LAB
ALBUMIN SERPL-MCNC: 3.9 GM/DL (ref 3.4–5)
ALP BLD-CCNC: 72 IU/L (ref 40–128)
ALT SERPL-CCNC: 8 U/L (ref 10–40)
ANION GAP SERPL CALCULATED.3IONS-SCNC: 17 MMOL/L (ref 4–16)
AST SERPL-CCNC: 12 IU/L (ref 15–37)
BACTERIA: ABNORMAL /HPF
BASOPHILS ABSOLUTE: 0.1 K/CU MM
BASOPHILS RELATIVE PERCENT: 0.6 % (ref 0–1)
BILIRUB SERPL-MCNC: 0.2 MG/DL (ref 0–1)
BILIRUBIN URINE: NEGATIVE MG/DL
BLOOD, URINE: ABNORMAL
BUN BLDV-MCNC: 29 MG/DL (ref 6–23)
CALCIUM SERPL-MCNC: 8.9 MG/DL (ref 8.3–10.6)
CHLORIDE BLD-SCNC: 100 MMOL/L (ref 99–110)
CLARITY: ABNORMAL
CO2: 18 MMOL/L (ref 21–32)
COLOR: YELLOW
CREAT SERPL-MCNC: 2.3 MG/DL (ref 0.6–1.1)
DIFFERENTIAL TYPE: ABNORMAL
EOSINOPHILS ABSOLUTE: 0.2 K/CU MM
EOSINOPHILS RELATIVE PERCENT: 2.7 % (ref 0–3)
GFR AFRICAN AMERICAN: 24 ML/MIN/1.73M2
GFR NON-AFRICAN AMERICAN: 20 ML/MIN/1.73M2
GLUCOSE FASTING: 89 MG/DL (ref 70–99)
GLUCOSE, URINE: NEGATIVE MG/DL
HCT VFR BLD CALC: 32.3 % (ref 37–47)
HEMOGLOBIN: 9.8 GM/DL (ref 12.5–16)
HYALINE CASTS: 2 /LPF
IMMATURE NEUTROPHIL %: 0.7 % (ref 0–0.43)
KETONES, URINE: NEGATIVE MG/DL
LEUKOCYTE ESTERASE, URINE: ABNORMAL
LYMPHOCYTES ABSOLUTE: 1.6 K/CU MM
LYMPHOCYTES RELATIVE PERCENT: 18.2 % (ref 24–44)
MCH RBC QN AUTO: 29.9 PG (ref 27–31)
MCHC RBC AUTO-ENTMCNC: 30.3 % (ref 32–36)
MCV RBC AUTO: 98.5 FL (ref 78–100)
MONOCYTES ABSOLUTE: 0.5 K/CU MM
MONOCYTES RELATIVE PERCENT: 6 % (ref 0–4)
NITRITE URINE, QUANTITATIVE: NEGATIVE
NUCLEATED RBC %: 0 %
PDW BLD-RTO: 14.9 % (ref 11.7–14.9)
PH, URINE: 5 (ref 5–8)
PLATELET # BLD: 318 K/CU MM (ref 140–440)
PMV BLD AUTO: 8.8 FL (ref 7.5–11.1)
POTASSIUM SERPL-SCNC: 4 MMOL/L (ref 3.5–5.1)
PROTEIN UA: 30 MG/DL
RBC # BLD: 3.28 M/CU MM (ref 4.2–5.4)
RBC URINE: 2 /HPF (ref 0–6)
SEGMENTED NEUTROPHILS ABSOLUTE COUNT: 6.3 K/CU MM
SEGMENTED NEUTROPHILS RELATIVE PERCENT: 71.8 % (ref 36–66)
SODIUM BLD-SCNC: 135 MMOL/L (ref 135–145)
SPECIFIC GRAVITY UA: 1.02 (ref 1–1.03)
SQUAMOUS EPITHELIAL: 11 /HPF
TOTAL IMMATURE NEUTOROPHIL: 0.06 K/CU MM
TOTAL NUCLEATED RBC: 0 K/CU MM
TOTAL PROTEIN: 6.8 GM/DL (ref 6.4–8.2)
TRICHOMONAS: ABNORMAL /HPF
TSH HIGH SENSITIVITY: 2.26 UIU/ML (ref 0.27–4.2)
UROBILINOGEN, URINE: NORMAL MG/DL (ref 0.2–1)
WBC # BLD: 8.8 K/CU MM (ref 4–10.5)
WBC CLUMP: ABNORMAL /HPF
WBC UA: 10 /HPF (ref 0–5)

## 2019-05-24 PROCEDURE — 1036F TOBACCO NON-USER: CPT | Performed by: INTERNAL MEDICINE

## 2019-05-24 PROCEDURE — G8420 CALC BMI NORM PARAMETERS: HCPCS | Performed by: INTERNAL MEDICINE

## 2019-05-24 PROCEDURE — 85025 COMPLETE CBC W/AUTO DIFF WBC: CPT

## 2019-05-24 PROCEDURE — 81001 URINALYSIS AUTO W/SCOPE: CPT

## 2019-05-24 PROCEDURE — 80053 COMPREHEN METABOLIC PANEL: CPT

## 2019-05-24 PROCEDURE — 1123F ACP DISCUSS/DSCN MKR DOCD: CPT | Performed by: INTERNAL MEDICINE

## 2019-05-24 PROCEDURE — 4040F PNEUMOC VAC/ADMIN/RCVD: CPT | Performed by: INTERNAL MEDICINE

## 2019-05-24 PROCEDURE — 36415 COLL VENOUS BLD VENIPUNCTURE: CPT

## 2019-05-24 PROCEDURE — 99215 OFFICE O/P EST HI 40 MIN: CPT | Performed by: INTERNAL MEDICINE

## 2019-05-24 PROCEDURE — 1111F DSCHRG MED/CURRENT MED MERGE: CPT | Performed by: INTERNAL MEDICINE

## 2019-05-24 PROCEDURE — G8598 ASA/ANTIPLAT THER USED: HCPCS | Performed by: INTERNAL MEDICINE

## 2019-05-24 PROCEDURE — G8427 DOCREV CUR MEDS BY ELIG CLIN: HCPCS | Performed by: INTERNAL MEDICINE

## 2019-05-24 PROCEDURE — 1090F PRES/ABSN URINE INCON ASSESS: CPT | Performed by: INTERNAL MEDICINE

## 2019-05-24 PROCEDURE — 84443 ASSAY THYROID STIM HORMONE: CPT

## 2019-05-24 RX ORDER — UREA 10 %
1 LOTION (ML) TOPICAL DAILY
COMMUNITY

## 2019-05-24 ASSESSMENT — ENCOUNTER SYMPTOMS
BLOOD IN STOOL: 0
EYE DISCHARGE: 0
NAUSEA: 0
WHEEZING: 0
VOMITING: 0
COUGH: 0
DIARRHEA: 1
EYE ITCHING: 0
ABDOMINAL DISTENTION: 0
COLOR CHANGE: 0
SHORTNESS OF BREATH: 0
CHEST TIGHTNESS: 0
ABDOMINAL PAIN: 0
TROUBLE SWALLOWING: 0

## 2019-05-24 ASSESSMENT — PATIENT HEALTH QUESTIONNAIRE - PHQ9
SUM OF ALL RESPONSES TO PHQ QUESTIONS 1-9: 0
1. LITTLE INTEREST OR PLEASURE IN DOING THINGS: 0
SUM OF ALL RESPONSES TO PHQ9 QUESTIONS 1 & 2: 0
2. FEELING DOWN, DEPRESSED OR HOPELESS: 0
SUM OF ALL RESPONSES TO PHQ QUESTIONS 1-9: 0

## 2019-05-24 NOTE — PROGRESS NOTES
Subjective: Rj Rahman is a 80 y.o. female who presents today for follow up on her chronic medical conditions as noted below. Patient Active Problem List:     Essential hypertension     Gait disturbance     GIB (gastrointestinal bleeding)     Type II diabetes mellitus ([facility]     Colon cancer screening     Anemia, chronic disease     PAF (paroxysmal atrial fibrillation) (HCC)     CAD S/P percutaneous coronary angioplasty     CKD (chronic kidney disease), stage IV (HCC)     Chronic anticoagulation     Clostridium difficile colitis     Bradycardia     She was seen once prior in April as new patient  She was formerly cared for by [facility] and his records were rec'd and rev'd    Then in early May she got lab and was sent to hospital for K of 6.4  She was hosp 5/1 - 5/2    She had C diff earlier this year in Jan 2019, with 2 subsequent recurrences : 3/2019, and 4/2019; for last occurrence she had prolonged vanco taper. She completed vancomycin end of April    She has recurrence of loose BMs and fecal incontinence   Began again about 10 d ago  BMs are about 3-4 /d   Has nocturnal diarrhea   No abd pain or blood in BM    Feel nauseated and has no appetite    Patient denies any exertional chest pain, dyspnea, palpitations, syncope, orthopnea, edema or paroxysmal nocturnal dyspnea. The patient denies cough, chest pain, dyspnea, wheezing or hemoptysis.     Feels a little dizzy  Uses walker at times  No fever or travel    She has been checking BS at home: running 90 -140 at random times    Current Outpatient Medications   Medication Sig Dispense Refill    Lactobacillus TABS Take 1 tablet by mouth daily      Fidaxomicin (DIFICID) 200 MG TABS tablet Take 200 mg by mouth 2 times daily 20 tablet 0    amiodarone (CORDARONE) 200 MG tablet Take 1 tablet by mouth daily 30 tablet 2    amLODIPine (NORVASC) 5 MG tablet Take 1 tablet by mouth daily 30 tablet 2    magnesium oxide (MAG-OX) 400 (241.3 Mg) MG TABS tablet Take 1 tablet by mouth daily 30 tablet 2    isosorbide mononitrate (IMDUR) 30 MG extended release tablet Take 1 tablet by mouth daily 30 tablet 0    ferrous sulfate 325 (65 Fe) MG tablet Take 1 tablet by mouth 2 times daily 180 tablet 1    aspirin 81 MG EC tablet Take 1 tablet by mouth daily 30 tablet 0    Cholecalciferol (VITAMIN D3) 5000 units TABS Take by mouth daily       No current facility-administered medications for this visit. Past Medical History:   Diagnosis Date    Anemia, chronic disease     Angioedema 08/2017    lisinopril    Asthma     childhood    CAD S/P percutaneous coronary angioplasty 06/2015    4 stents; Dr Lulu Abbott cardiologist    Chronic anticoagulation     PAF; stopped 2019 for recurrent GI bleeding and anemia    CKD (chronic kidney disease), stage IV ([facility]     Dr Umesh Glez    Clostridium difficile colitis 1/2019, 3/2019, 4/2019    Diverticulitis 04/2019    Hyperlipidemia     Hypertension     Melanoma ([facility] 2014    right arm    PAF (paroxysmal atrial fibrillation) ([facility]     Type II diabetes mellitus ([facility] 2009      Past Surgical History:   Procedure Laterality Date    APPENDECTOMY  1999    CATARACT REMOVAL WITH IMPLANT Bilateral     Cataracs    HYSTERECTOMY, TOTAL ABDOMINAL  1999    SALPINGO-OOPHORECTOMY  1999       Social History     Tobacco Use    Smoking status: Never Smoker    Smokeless tobacco: Never Used   Substance Use Topics    Alcohol use: Yes     Comment: socially only      Review of Systems   Constitutional: Positive for activity change, appetite change, fatigue and unexpected weight change. Negative for fever. HENT: Negative for congestion, dental problem, postnasal drip and trouble swallowing. Eyes: Negative for discharge and itching. Respiratory: Negative for cough, chest tightness, shortness of breath and wheezing. Cardiovascular: Negative for chest pain, palpitations and leg swelling. Gastrointestinal: Positive for diarrhea.  Negative for abdominal distention, abdominal pain, blood in stool, nausea and vomiting. Endocrine: Negative for cold intolerance. Genitourinary: Negative for difficulty urinating, dysuria, hematuria, pelvic pain and vaginal bleeding. Musculoskeletal: Negative for gait problem, joint swelling, myalgias and neck stiffness. Skin: Positive for pallor. Negative for color change and rash. Neurological: Positive for dizziness and weakness. Negative for tremors, speech difficulty and numbness. Hematological: Negative for adenopathy. Does not bruise/bleed easily. Psychiatric/Behavioral: Positive for dysphoric mood. Negative for behavioral problems and decreased concentration. The patient is not nervous/anxious. Objective:      BP (!) 128/56   Pulse 62   Resp 14   Wt 116 lb (52.6 kg)   SpO2 98%   BMI 19.30 kg/m²      Physical Exam   Constitutional: She is oriented to person, place, and time. She appears well-developed and well-nourished. No distress. Looks worried and wan  Mildly pallid   HENT:   Head: Normocephalic and atraumatic. Mouth/Throat: Oropharynx is clear and moist.   Tongue and mucus memb pallid   Eyes: Conjunctivae are normal. No scleral icterus. Neck: Neck supple. No JVD present. Cardiovascular: Normal rate and regular rhythm. No murmur heard. Pulmonary/Chest: Effort normal and breath sounds normal. No respiratory distress. She has no wheezes. She has no rales. Abdominal: Soft. She exhibits no distension and no mass. There is no tenderness. Musculoskeletal: Normal range of motion. She exhibits no edema, tenderness or deformity. Lymphadenopathy:     She has no cervical adenopathy. Neurological: She is alert and oriented to person, place, and time. Coordination normal.   Skin: Skin is warm and dry. There is pallor. Psychiatric: Her behavior is normal.   Appears sad and worried;    Vitals reviewed.     hosp records and labs rev'd    Last hgb was 8.5 on 5/2  K 4.2   Creat 2.2 on May 2  BS 91 on 5/2     Assessment / Plan:      1. Clostridium difficile colitis    2. Diarrhea, unspecified type    3. Elevated TSH    4. Hypothyroidism, unspecified type    5.  History of gastrointestinal bleeding            Plan    Begin Fidoxamicin 200 bid x 10 d  Refer to Rain Sosa; patient may need fecal microbiota transplant    Get labs and stool analyisis  Orders Placed This Encounter   Procedures    C DIFF TOXIN/ANTIGEN    CBC Auto Differential    Comprehensive Metabolic Panel    TSH without Reflex    Urinalysis with Microscopic    AFL - Pam Garnett MD, Gastroenterology, [place]     RTC 5 d ( she is here on Friday, of [facility] Day Weekend)

## 2019-05-26 ENCOUNTER — HOSPITAL ENCOUNTER (INPATIENT)
Age: 84
LOS: 2 days | Discharge: HOME HEALTH | DRG: 372 | End: 2019-05-28
Attending: EMERGENCY MEDICINE | Admitting: INTERNAL MEDICINE
Payer: MEDICARE

## 2019-05-26 ENCOUNTER — APPOINTMENT (OUTPATIENT)
Dept: CT IMAGING | Age: 84
DRG: 372 | End: 2019-05-26
Payer: MEDICARE

## 2019-05-26 DIAGNOSIS — R19.7 DIARRHEA, UNSPECIFIED TYPE: Primary | ICD-10-CM

## 2019-05-26 DIAGNOSIS — Z86.19 HISTORY OF CLOSTRIDIUM DIFFICILE COLITIS: ICD-10-CM

## 2019-05-26 DIAGNOSIS — E86.0 DEHYDRATION: ICD-10-CM

## 2019-05-26 DIAGNOSIS — E83.42 HYPOMAGNESEMIA: ICD-10-CM

## 2019-05-26 DIAGNOSIS — E87.1 HYPONATREMIA: ICD-10-CM

## 2019-05-26 LAB
ALBUMIN SERPL-MCNC: 3.2 GM/DL (ref 3.4–5)
ALP BLD-CCNC: 56 IU/L (ref 40–129)
ALT SERPL-CCNC: 7 U/L (ref 10–40)
ANION GAP SERPL CALCULATED.3IONS-SCNC: 13 MMOL/L (ref 4–16)
AST SERPL-CCNC: 11 IU/L (ref 15–37)
BASOPHILS ABSOLUTE: 0 K/CU MM
BASOPHILS RELATIVE PERCENT: 0.4 % (ref 0–1)
BILIRUB SERPL-MCNC: 0.2 MG/DL (ref 0–1)
BUN BLDV-MCNC: 27 MG/DL (ref 6–23)
CALCIUM SERPL-MCNC: 8.5 MG/DL (ref 8.3–10.6)
CHLORIDE BLD-SCNC: 98 MMOL/L (ref 99–110)
CLOSTRIDIUM DIFFICILE, PCR: ABNORMAL
CLOSTRIDIUM DIFFICILE, PCR: ABNORMAL
CO2: 19 MMOL/L (ref 21–32)
CREAT SERPL-MCNC: 1.8 MG/DL (ref 0.6–1.1)
DIFFERENTIAL TYPE: ABNORMAL
EOSINOPHILS ABSOLUTE: 0.1 K/CU MM
EOSINOPHILS RELATIVE PERCENT: 0.8 % (ref 0–3)
GFR AFRICAN AMERICAN: 32 ML/MIN/1.73M2
GFR NON-AFRICAN AMERICAN: 26 ML/MIN/1.73M2
GLUCOSE BLD-MCNC: 105 MG/DL (ref 70–99)
GLUCOSE BLD-MCNC: 153 MG/DL (ref 70–99)
HCT VFR BLD CALC: 27.8 % (ref 37–47)
HEMOGLOBIN: 8.8 GM/DL (ref 12.5–16)
IMMATURE NEUTROPHIL %: 0.4 % (ref 0–0.43)
LYMPHOCYTES ABSOLUTE: 1 K/CU MM
LYMPHOCYTES RELATIVE PERCENT: 8.7 % (ref 24–44)
MAGNESIUM: 1.6 MG/DL (ref 1.8–2.4)
MCH RBC QN AUTO: 30.2 PG (ref 27–31)
MCHC RBC AUTO-ENTMCNC: 31.7 % (ref 32–36)
MCV RBC AUTO: 95.5 FL (ref 78–100)
MONOCYTES ABSOLUTE: 0.7 K/CU MM
MONOCYTES RELATIVE PERCENT: 6.7 % (ref 0–4)
NUCLEATED RBC %: 0 %
PDW BLD-RTO: 14.6 % (ref 11.7–14.9)
PLATELET # BLD: 259 K/CU MM (ref 140–440)
PMV BLD AUTO: 8.3 FL (ref 7.5–11.1)
POTASSIUM SERPL-SCNC: 3.5 MMOL/L (ref 3.5–5.1)
RBC # BLD: 2.91 M/CU MM (ref 4.2–5.4)
SEGMENTED NEUTROPHILS ABSOLUTE COUNT: 9.2 K/CU MM
SEGMENTED NEUTROPHILS RELATIVE PERCENT: 83 % (ref 36–66)
SODIUM BLD-SCNC: 130 MMOL/L (ref 135–145)
TOTAL IMMATURE NEUTOROPHIL: 0.04 K/CU MM
TOTAL NUCLEATED RBC: 0 K/CU MM
TOTAL PROTEIN: 6.6 GM/DL (ref 6.4–8.2)
WBC # BLD: 11.1 K/CU MM (ref 4–10.5)

## 2019-05-26 PROCEDURE — 2580000003 HC RX 258: Performed by: PHYSICIAN ASSISTANT

## 2019-05-26 PROCEDURE — 83735 ASSAY OF MAGNESIUM: CPT

## 2019-05-26 PROCEDURE — 87324 CLOSTRIDIUM AG IA: CPT

## 2019-05-26 PROCEDURE — 80053 COMPREHEN METABOLIC PANEL: CPT

## 2019-05-26 PROCEDURE — 74176 CT ABD & PELVIS W/O CONTRAST: CPT

## 2019-05-26 PROCEDURE — 82962 GLUCOSE BLOOD TEST: CPT

## 2019-05-26 PROCEDURE — 85025 COMPLETE CBC W/AUTO DIFF WBC: CPT

## 2019-05-26 PROCEDURE — 1200000000 HC SEMI PRIVATE

## 2019-05-26 PROCEDURE — 87507 IADNA-DNA/RNA PROBE TQ 12-25: CPT

## 2019-05-26 PROCEDURE — 6360000002 HC RX W HCPCS: Performed by: NURSE PRACTITIONER

## 2019-05-26 PROCEDURE — 99285 EMERGENCY DEPT VISIT HI MDM: CPT

## 2019-05-26 PROCEDURE — 2580000003 HC RX 258: Performed by: NURSE PRACTITIONER

## 2019-05-26 RX ORDER — SODIUM CHLORIDE 9 MG/ML
INJECTION, SOLUTION INTRAVENOUS CONTINUOUS
Status: DISCONTINUED | OUTPATIENT
Start: 2019-05-26 | End: 2019-05-26

## 2019-05-26 RX ORDER — SODIUM CHLORIDE 0.9 % (FLUSH) 0.9 %
10 SYRINGE (ML) INJECTION PRN
Status: DISCONTINUED | OUTPATIENT
Start: 2019-05-26 | End: 2019-05-28 | Stop reason: HOSPADM

## 2019-05-26 RX ORDER — SODIUM CHLORIDE 9 MG/ML
INJECTION, SOLUTION INTRAVENOUS CONTINUOUS
Status: DISPENSED | OUTPATIENT
Start: 2019-05-26 | End: 2019-05-27

## 2019-05-26 RX ORDER — MAGNESIUM SULFATE IN WATER 40 MG/ML
2 INJECTION, SOLUTION INTRAVENOUS ONCE
Status: COMPLETED | OUTPATIENT
Start: 2019-05-26 | End: 2019-05-26

## 2019-05-26 RX ORDER — SODIUM CHLORIDE 0.9 % (FLUSH) 0.9 %
10 SYRINGE (ML) INJECTION EVERY 12 HOURS SCHEDULED
Status: DISCONTINUED | OUTPATIENT
Start: 2019-05-26 | End: 2019-05-28 | Stop reason: HOSPADM

## 2019-05-26 RX ORDER — HEPARIN SODIUM 5000 [USP'U]/ML
5000 INJECTION, SOLUTION INTRAVENOUS; SUBCUTANEOUS EVERY 8 HOURS SCHEDULED
Status: DISCONTINUED | OUTPATIENT
Start: 2019-05-27 | End: 2019-05-28 | Stop reason: HOSPADM

## 2019-05-26 RX ORDER — ONDANSETRON 2 MG/ML
4 INJECTION INTRAMUSCULAR; INTRAVENOUS EVERY 6 HOURS PRN
Status: DISCONTINUED | OUTPATIENT
Start: 2019-05-26 | End: 2019-05-28 | Stop reason: HOSPADM

## 2019-05-26 RX ORDER — ACETAMINOPHEN 325 MG/1
650 TABLET ORAL EVERY 4 HOURS PRN
Status: DISCONTINUED | OUTPATIENT
Start: 2019-05-26 | End: 2019-05-28 | Stop reason: HOSPADM

## 2019-05-26 RX ADMIN — SODIUM CHLORIDE, PRESERVATIVE FREE 10 ML: 5 INJECTION INTRAVENOUS at 21:59

## 2019-05-26 RX ADMIN — MAGNESIUM SULFATE HEPTAHYDRATE 2 G: 40 INJECTION, SOLUTION INTRAVENOUS at 21:59

## 2019-05-26 RX ADMIN — SODIUM CHLORIDE: 9 INJECTION, SOLUTION INTRAVENOUS at 21:59

## 2019-05-26 RX ADMIN — SODIUM CHLORIDE: 9 INJECTION, SOLUTION INTRAVENOUS at 18:57

## 2019-05-26 ASSESSMENT — PAIN SCALES - GENERAL: PAINLEVEL_OUTOF10: 0

## 2019-05-26 NOTE — ED NOTES
Bed: ED-29  Expected date:   Expected time:   Means of arrival:   Comments:  [facility] [address] Milo Valencia, RN  05/26/19 8599

## 2019-05-26 NOTE — ED TRIAGE NOTES
Pt was seen at PCP last week and give RX for antibiotic that was too expensive for her to fill. Pt also c/o diarrhea for the past 2 days.

## 2019-05-26 NOTE — ED PROVIDER NOTES
eMERGENCY dEPARTMENT eNCOUnter      PCP: Gaudencio Martins MD    [facility]    Chief Complaint   Patient presents with    Diarrhea       HPI    Sara Gómez is a 80 y.o. female who presents with diarrhea. Onset approximately 5-7 days. Context is patient has nonbloody diarrhea for approx 2 wks. Patient was seen by her family physician and stool cultures were ordered-she was unable to give a stool sample at time of lab visit. She states she was given oral antibiotic prescription but was unable to fill this-states her require payment was $1200 and she was not able to afford this. Patient states she has associated general tiredness-no other associated symptoms. REVIEW OF SYSTEMS    Constitutional:  Denies fever, chills, weight loss or weakness   HENT:  Denies sore throat or ear pain   Cardiovascular:  Denies chest pain, palpitations or swelling   Respiratory:  Denies cough or shortness of breath   GI:  See HPI above  : No hematuria or dysuria. Musculoskeletal:  Denies back pain or groin pain or masses. No pain or swelling of extremities.   Skin:  Denies rash  Neurologic:  Denies headache, focal weakness or sensory changes   Endocrine:  Denies polyuria or polydypsia   Lymphatic:  Denies swollen glands     All other review of systems are negative  See HPI and nursing notes for additional information     PAST MEDICAL & SURGICAL HISTORY    Past Medical History:   Diagnosis Date    Anemia, chronic disease     Angioedema 08/2017    lisinopril    Asthma     childhood    CAD S/P percutaneous coronary angioplasty 06/2015    4 stents; Dr Hugo Bay cardiologist    Chronic anticoagulation     PAF; stopped 2019 for recurrent GI bleeding and anemia    CKD (chronic kidney disease), stage IV ([address]     Dr Boni Cabot    Clostridium difficile colitis 1/2019, 3/2019, 4/2019    Diverticulitis 04/2019    Hyperlipidemia     Hypertension     Melanoma ([facility] 2014    right arm    PAF (paroxysmal atrial fibrillation) 2.91 (L) 4.2 - 5.4 M/CU MM    Hemoglobin 8.8 (L) 12.5 - 16.0 GM/DL    Hematocrit 27.8 (L) 37 - 47 %    MCV 95.5 78 - 100 FL    MCH 30.2 27 - 31 PG    MCHC 31.7 (L) 32.0 - 36.0 %    RDW 14.6 11.7 - 14.9 %    Platelets 025 997 - 226 K/CU MM    MPV 8.3 7.5 - 11.1 FL    Differential Type AUTOMATED DIFFERENTIAL     Segs Relative 83.0 (H) 36 - 66 %    Lymphocytes % 8.7 (L) 24 - 44 %    Monocytes % 6.7 (H) 0 - 4 %    Eosinophils % 0.8 0 - 3 %    Basophils % 0.4 0 - 1 %    Segs Absolute 9.2 K/CU MM    Lymphocytes # 1.0 K/CU MM    Monocytes # 0.7 K/CU MM    Eosinophils # 0.1 K/CU MM    Basophils # 0.0 K/CU MM    Nucleated RBC % 0.0 %    Total Nucleated RBC 0.0 K/CU MM    Total Immature Neutrophil 0.04 K/CU MM    Immature Neutrophil % 0.4 0 - 0.43 %   CMP   Result Value Ref Range    Sodium 130 (L) 135 - 145 MMOL/L    Potassium 3.5 3.5 - 5.1 MMOL/L    Chloride 98 (L) 99 - 110 mMol/L    CO2 19 (L) 21 - 32 MMOL/L    BUN 27 (H) 6 - 23 MG/DL    CREATININE 1.8 (H) 0.6 - 1.1 MG/DL    Glucose 153 (H) 70 - 99 MG/DL    Calcium 8.5 8.3 - 10.6 MG/DL    Alb 3.2 (L) 3.4 - 5.0 GM/DL    Total Protein 6.6 6.4 - 8.2 GM/DL    Total Bilirubin 0.2 0.0 - 1.0 MG/DL    ALT 7 (L) 10 - 40 U/L    AST 11 (L) 15 - 37 IU/L    Alkaline Phosphatase 56 40 - 129 IU/L    GFR Non- 26 (L) >60 mL/min/1.73m2    GFR  32 (L) >60 mL/min/1.73m2    Anion Gap 13 4 - 16   Magnesium   Result Value Ref Range    Magnesium 1.6 (L) 1.8 - 2.4 mg/dl             ED COURSE & MEDICAL DECISION MAKING        History and exam is consistent with diarrhea. Patient has a history of recurrent Clostridium difficile. Patient is hyponatremic, clinically slightly dehydrated, and general fatigue. I discussed Pt case with Dr. Delano Srinivasan, patient's family physician. He recommends obtain stool sample and if patient is stable may be discharged home, if patient is not able to be discharged, he agrees with admission.   [phone] - I discussed patient case with hospitalist, nurse practitioner Brina Curtis. He agrees to admit patient. He agrees to collect stool sample when patient is able to provide a sample. Vital signs and nursing notes reviewed during ED course. Patient care and presentation staffed with supervising MD.   Patient seen by supervising MD today- see his/her note for details of the encounter. Clinical  IMPRESSION    1. Diarrhea, unspecified type    2. Dehydration    3. Hyponatremia    4. Hypomagnesemia    5. History of Clostridium difficile colitis        Pt admitted. Comment: Please note this report has been produced using speech recognition software and may contain errors related to that system including errors in grammar, punctuation, and spelling, as well as words and phrases that may be inappropriate. If there are any questions or concerns please feel free to contact the dictating provider for clarification.            Lee Jadema  05/26/19 8961

## 2019-05-26 NOTE — ED PROVIDER NOTES
I independently examined and evaluated Amado Paul. In brief their history revealed patient with recurrent diarrhea. Patient has been working with her primary care physician, but was not able to provide stool sample and could not fill med prescribed. Patient has problems for the past two weeks. Patient with increasing weakness. No blood in stool that patient is aware of. Their focused exam revealed the patient is afebrile and hemodynamically stable on RA. The patient appears age appropriate, appears well-hydrated, well-nourished. Patient does appear stated age. Patient sitting comfortable in bed. Mucous membranes are moist. Speech is clear. Breathing is unlabored. Speaking clearly in full sentences. Abdomen is with no tenderness to palpation. No rebound or guarding. Skin is dry. Mental status is normal. The patient moves all extremities and is without facial droop. ED course: Pt presents as above. Emergent conditions considered. Presentation prompted initial labs. Labs demonstrating hyponatremia. Patient's primary care physician was contacted by my PA and given the above is in agreement with admitting the patient. IVF initiated. Antibiotics held until stool sample sent. Patient discussed with hospitalist team by my PA and patient admitted to medicine. Questions sought and answered with the patient. They voice understanding and agree with plan. Instructed to return for any worsening or worrisome concerns. All diagnostic, treatment, and disposition decisions were made by myself in conjunction with the Advanced Practice Provider. For all further details of the patient's emergency department visit, please see the Advanced Practice Provider's documentation.        Jane Valdivia MD  05/26/19 [facility] [place]

## 2019-05-26 NOTE — H&P
Please link note to H&P performed by AURELIO on same day      Diarrhea 10 days, C.diff hx  Dr Delano Srinivasan recently rec dificid but cost prohibitive. ED d/w PCP, rec home with vanco taper but evaluation suggest need for monitoring.      A/P  - check c.diff  - vanco QID  - CT A/P w/o contrast  - lactate  - IVF    [address] [place] [address] Internal Medicine  5/26/2019 at 7:03 PM

## 2019-05-26 NOTE — H&P
History and Physical      Name:  Michel Hart /Age/Sex: 1930  (80 y.o. female)   MRN & CSN:  [id number] & [id number] Admission Date/Time: 2019  5:04 PM   Location:  ED29/ED-29 PCP: Ervin Atkinson 29 Grace Farrar Day: 1    Discussed patient and POC with Dr. Chu Louis and Plan:     Michel Hart is a 80 y.o.  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 - Diarrhea  7 episodes/day, non-bloody  GI disease panel PCR  IVF at 75/hr overnight  Will get Lactate  CT a/p without contrast  GI consult- follows with Dr Antonio Sidhu  Vancomycin  qid  C diff precautions    - Hypomagnesemia  2/2 to above  1.6 on admission   Replace with 2 grams IV   Recheck    - Hyponatremia  2/2 to # 1   130 on admission  Chronic  IVF as above and recheck    - HTN  Stable  Continue norvasc, imdur    - Anemia, chronic  8.8 on admission (baseline 9)  On iron  Denies gross blood in stool  Continue to monitor    - CAD s/p PCI    - DM 2  States she controls with diet; not on meds        Discussed patient with ED physician    Diet Carb control   DVT Prophylaxis [] Lovenox, [x]  Heparin, [] SCDs, [] Ambulation   GI Prophylaxis [] PPI,  [x] H2 Blocker,  [] Carafate,  [] Diet/Tube Feeds   Code Status FUll   Disposition Patient requires continued admission due to electrolyte derangement and weakness; needing a safe discharge plan   MDM [] Low, [x] Moderate,[]  High  Patient's risk as above due to      [] One or more chronic illnesses with exacerbation progression      [x] Two or more stable chronic illnesses      [x] Undiagnosed new problem with uncertain prognosis      [] Elective major surgery      []Prescription drug management     History of Present Illness:     Chief Complaint: Persistent diarrhea    Michel Hart is a 80 y.o.  female  who presents from home with diarrhea, onset 10 days ago.   Context is that has been treated for c diff colitis 3 times this year- January, March, April. She reports symptom resolution in late April and felt well for 3 weeks. Reports seeing Dr Matt Cuba last week and being prescribed fidaxoimicin, which she did not fill because of the cost.  She denies fever/chills, nausea/vomiting. Reports non-bloody diarrhea, about 7 episodes per day the last 3 days. States some episodes are a very small amount of fluid, others significant amount of fluid. Denies formed stool last 4-5 days. States she feels weak. Endorses diffuse abdominal pain, onset 3-4 days ago. Does not radiate, identifies no exacerbating/alleviating factors. Has hx of DM and used to take metformin but states she has not taken meds for this and controls glucose with her diet; states she checks glucose every day. Denies chest pain/pressure, sob. Both children [place] of state. She resides independently. Confirms code status. Ten point ROS reviewed negative, unless as noted above    Objective:   No intake or output data in the 24 hours ending 05/26/19 1858   Vitals:   Vitals:    05/26/19 1707   BP: (!) 146/69   Pulse: 74   Resp: 14   Temp: 98.7 °F (37.1 °C)   SpO2: 98%     Physical Exam:   GEN Awake female, sitting upright in bed in no apparent distress. Appears given age. EYES Pupils are 3mm and PERRLA bilat. EOMs intact. No scleral erythema, discharge, or conjunctivitis. HENT Mucous membranes are moist. Oral pharynx without exudates, no evidence of thrush. NECK Supple, no apparent thyromegaly or masses. RESP Clear to auscultation, no wheezes, rales or rhonchi. Symmetric chest movement while on room air. CARDIO/VASC S1/S2 auscultated. Regular rate without appreciable murmurs, rubs, or gallops. No JVD or carotid bruits. Peripheral pulses equal bilaterally and palpable. No peripheral edema. GI Abdomen is soft without significant tenderness, masses, or guarding. Bowel sounds are normoactive. Rectal exam deferred.  No costovertebral angle tenderness.  Singleton catheter is not present. HEME/LYMPH No palpable cervical lymphadenopathy and no hepatosplenomegaly. No petechiae or ecchymoses. MSK No gross joint deformities. 5/5 Strength bilaterally to delt/biceps/triceps/grasp/finger intrinsics/hip/knee/pf/df  SKIN Normal coloration, warm, dry. NEURO Cranial nerves appear grossly intact, normal speech, no lateralizing weakness. Sensation intact and equal bilaterally  PSYCH Awake, alert, oriented x 4. Affect appropriate. Past Medical History:      Past Medical History:   Diagnosis Date    Anemia, chronic disease     Angioedema 08/2017    lisinopril    Asthma     childhood    CAD S/P percutaneous coronary angioplasty 06/2015    4 stents; Dr Kelli Bridges cardiologist    Chronic anticoagulation     PAF; stopped 2019 for recurrent GI bleeding and anemia    CKD (chronic kidney disease), stage IV ([facility] [address]     Dr Marisa Harris    Clostridium difficile colitis 1/2019, 3/2019, 4/2019    Diverticulitis 04/2019    Hyperlipidemia     Hypertension     Melanoma ([facility] [address] 2014    right arm    PAF (paroxysmal atrial fibrillation) ([facility] [address]     Type II diabetes mellitus ([facility] [address] 2009     PSHX:  has a past surgical history that includes Hysterectomy, total abdominal (1999); Salpingo-oophorectomy (1999); Appendectomy (1999); and Cataract removal with implant (Bilateral). Allergies: Allergies   Allergen Reactions    Lisinopril Anaphylaxis       FAM HX: family history includes Diabetes in her father and mother; Heart Disease in her father and mother; High Blood Pressure in her father and mother. Soc HX:   Social History     Socioeconomic History    Marital status:       Spouse name: None    Number of children: 3    Years of education: None    Highest education level: None   Occupational History    None   Social Needs    Financial resource strain: None    Food insecurity:     Worry: None     Inability: None    Transportation needs:     Medical: None     Non-medical: None   Tobacco Use    Smoking status: Never Smoker    Smokeless tobacco: Never Used   Substance and Sexual Activity    Alcohol use: Yes     Comment: socially only    Drug use: No    Sexual activity: None   Lifestyle    Physical activity:     Days per week: None     Minutes per session: None    Stress: None   Relationships    Social connections:     Talks on phone: None     Gets together: None     Attends Church service: None     Active member of club or organization: None     Attends meetings of clubs or organizations: None     Relationship status: None    Intimate partner violence:     Fear of current or ex partner: None     Emotionally abused: None     Physically abused: None     Forced sexual activity: None   Other Topics Concern    None   Social History Narrative    None       Medications:   Medications:      Lactobacillus TABS Take 1 tablet by mouth daily Historical Provider, MD Needs Review   Fidaxomicin (DIFICID) 200 MG TABS tablet Take 200 mg by mouth 2 times daily Luca Jackson MD Needs Review   amiodarone (CORDARONE) 200 MG tablet Take 1 tablet by mouth daily Luca Jackson MD Needs Review   amLODIPine (NORVASC) 5 MG tablet Take 1 tablet by mouth daily Luca Jackson MD Needs Review   magnesium oxide (MAG-OX) 400 (241.3 Mg) MG TABS tablet Take 1 tablet by mouth daily Luca Jackson MD Needs Review   isosorbide mononitrate (IMDUR) 30 MG extended release tablet Take 1 tablet by mouth daily Luca Jackson MD Needs Review   ferrous sulfate 325 (65 Fe) MG tablet Take 1 tablet by mouth 2 times daily Luca Jackson MD Needs Review   aspirin 81 MG EC tablet Take 1 tablet by mouth daily Facundo Lopez MD Needs Review   Cholecalciferol (VITAMIN D3) 5000 units TABS Take by mouth daily Historical Provider, MD Needs Review     Data:     Electronically signed by MEGAN Welsh NP on 5/26/2019 at 6:58 PM

## 2019-05-27 LAB
ADENOVIRUS F 40 41 PCR: NOT DETECTED
ANION GAP SERPL CALCULATED.3IONS-SCNC: 13 MMOL/L (ref 4–16)
ASTROVIRUS PCR: NOT DETECTED
BASOPHILS ABSOLUTE: 0 K/CU MM
BASOPHILS RELATIVE PERCENT: 0.6 % (ref 0–1)
BUN BLDV-MCNC: 22 MG/DL (ref 6–23)
CALCIUM SERPL-MCNC: 8.3 MG/DL (ref 8.3–10.6)
CAMPYLOBACTER PCR: NOT DETECTED
CHLORIDE BLD-SCNC: 107 MMOL/L (ref 99–110)
CO2: 18 MMOL/L (ref 21–32)
CREAT SERPL-MCNC: 1.9 MG/DL (ref 0.6–1.1)
CRYPTOSPORIDIUM PCR: NOT DETECTED
CYCLOSPORA CAYETANENSIS PCR: NOT DETECTED
DIFFERENTIAL TYPE: ABNORMAL
E COLI 0157 PCR: NOT DETECTED
E COLI ENTEROAGGREGATIVE PCR: NOT DETECTED
E COLI ENTEROPATHOGENIC PCR: NOT DETECTED
E COLI ENTEROTOXIGENIC PCR: NOT DETECTED
E COLI SHIGA LIKE TOXIN PCR: NOT DETECTED
E COLI SHIGELLA/ENTEROINVASIVE PCR: NOT DETECTED
ENTAMOEBA HISTOLYTICA PCR: NOT DETECTED
EOSINOPHILS ABSOLUTE: 0.3 K/CU MM
EOSINOPHILS RELATIVE PERCENT: 4.6 % (ref 0–3)
GFR AFRICAN AMERICAN: 30 ML/MIN/1.73M2
GFR NON-AFRICAN AMERICAN: 25 ML/MIN/1.73M2
GIARDIA LAMBLIA PCR: NOT DETECTED
GLUCOSE BLD-MCNC: 100 MG/DL (ref 70–99)
GLUCOSE BLD-MCNC: 101 MG/DL (ref 70–99)
GLUCOSE BLD-MCNC: 103 MG/DL (ref 70–99)
GLUCOSE BLD-MCNC: 119 MG/DL (ref 70–99)
GLUCOSE BLD-MCNC: 98 MG/DL (ref 70–99)
HCT VFR BLD CALC: 27.4 % (ref 37–47)
HEMOGLOBIN: 8.3 GM/DL (ref 12.5–16)
IMMATURE NEUTROPHIL %: 0.6 % (ref 0–0.43)
LACTATE: 0.4 MMOL/L (ref 0.4–2)
LYMPHOCYTES ABSOLUTE: 1.6 K/CU MM
LYMPHOCYTES RELATIVE PERCENT: 22.3 % (ref 24–44)
MAGNESIUM: 2.5 MG/DL (ref 1.8–2.4)
MCH RBC QN AUTO: 29.9 PG (ref 27–31)
MCHC RBC AUTO-ENTMCNC: 30.3 % (ref 32–36)
MCV RBC AUTO: 98.6 FL (ref 78–100)
MONOCYTES ABSOLUTE: 0.5 K/CU MM
MONOCYTES RELATIVE PERCENT: 7 % (ref 0–4)
NOROVIRUS GI GII PCR: NOT DETECTED
NUCLEATED RBC %: 0 %
PDW BLD-RTO: 14.6 % (ref 11.7–14.9)
PLATELET # BLD: 258 K/CU MM (ref 140–440)
PLESIOMONAS SHIGELLOIDES PCR: NOT DETECTED
PMV BLD AUTO: 8.6 FL (ref 7.5–11.1)
POTASSIUM SERPL-SCNC: 3.6 MMOL/L (ref 3.5–5.1)
RBC # BLD: 2.78 M/CU MM (ref 4.2–5.4)
ROTAVIRUS A PCR: NOT DETECTED
SALMONELLA PCR: NOT DETECTED
SAPOVIRUS PCR: NOT DETECTED
SEGMENTED NEUTROPHILS ABSOLUTE COUNT: 4.6 K/CU MM
SEGMENTED NEUTROPHILS RELATIVE PERCENT: 64.9 % (ref 36–66)
SODIUM BLD-SCNC: 138 MMOL/L (ref 135–145)
TOTAL IMMATURE NEUTOROPHIL: 0.04 K/CU MM
TOTAL NUCLEATED RBC: 0 K/CU MM
VIBRIO CHOLERAE PCR: NOT DETECTED
VIBRIO PCR: NOT DETECTED
WBC # BLD: 7 K/CU MM (ref 4–10.5)
YERSINIA ENTEROCOLITICA PCR: NOT DETECTED

## 2019-05-27 PROCEDURE — 83605 ASSAY OF LACTIC ACID: CPT

## 2019-05-27 PROCEDURE — 2580000003 HC RX 258: Performed by: NURSE PRACTITIONER

## 2019-05-27 PROCEDURE — 1200000000 HC SEMI PRIVATE

## 2019-05-27 PROCEDURE — 6370000000 HC RX 637 (ALT 250 FOR IP): Performed by: NURSE PRACTITIONER

## 2019-05-27 PROCEDURE — 80048 BASIC METABOLIC PNL TOTAL CA: CPT

## 2019-05-27 PROCEDURE — 85025 COMPLETE CBC W/AUTO DIFF WBC: CPT

## 2019-05-27 PROCEDURE — 36415 COLL VENOUS BLD VENIPUNCTURE: CPT

## 2019-05-27 PROCEDURE — 82962 GLUCOSE BLOOD TEST: CPT

## 2019-05-27 PROCEDURE — 83735 ASSAY OF MAGNESIUM: CPT

## 2019-05-27 PROCEDURE — 6360000002 HC RX W HCPCS: Performed by: NURSE PRACTITIONER

## 2019-05-27 RX ADMIN — Medication 250 MG: at 05:47

## 2019-05-27 RX ADMIN — SODIUM CHLORIDE: 9 INJECTION, SOLUTION INTRAVENOUS at 14:05

## 2019-05-27 RX ADMIN — Medication 250 MG: at 02:09

## 2019-05-27 RX ADMIN — HEPARIN SODIUM 5000 UNITS: 5000 INJECTION, SOLUTION INTRAVENOUS; SUBCUTANEOUS at 14:05

## 2019-05-27 RX ADMIN — Medication 250 MG: at 12:27

## 2019-05-27 RX ADMIN — SODIUM CHLORIDE, PRESERVATIVE FREE 10 ML: 5 INJECTION INTRAVENOUS at 20:08

## 2019-05-27 RX ADMIN — HEPARIN SODIUM 5000 UNITS: 5000 INJECTION, SOLUTION INTRAVENOUS; SUBCUTANEOUS at 20:08

## 2019-05-27 RX ADMIN — HEPARIN SODIUM 5000 UNITS: 5000 INJECTION, SOLUTION INTRAVENOUS; SUBCUTANEOUS at 05:47

## 2019-05-27 NOTE — PROGRESS NOTES
Hospitalist Progress Note      Name:  Armani Wilson /Age/Sex: 1930  (80 y.o. female)   MRN & CSN:  [id number] & [id number] Admission Date/Time: 2019  5:04 PM   Location:  0819/2597-X PCP: Teresa Ramirez MD       Armani Wilson is a 80 y.o.  female  who presents with Diarrhea      Assessment and Plan: C. Diff Diarrhea  - CT abd/pelvis: + diverticulosis  - IVF  - oral vanc QID  - GI consulted from admission    EDUAR on CKD  - improved with IVF    Feels a bit better today, she can't get her scripts today due to holiday, likely d/c tomorrow am and try to have her tolerate oral today            Diet DIET CARB CONTROL;   Code Status Full Code     Medications:   Medications:    sodium chloride flush  10 mL Intravenous 2 times per day    vancomycin  250 mg Oral 4 times per day    famotidine (PEPCID) injection  20 mg Intravenous Daily    heparin (porcine)  5,000 Units Subcutaneous 3 times per day    pneumococcal 13-valent conjugate  0.5 mL Intramuscular Once      Infusions:    sodium chloride 75 mL/hr at 19 2159     PRN Meds:   sodium chloride flush 10 mL PRN   magnesium hydroxide 30 mL Daily PRN   ondansetron 4 mg Q6H PRN   acetaminophen 650 mg Q4H PRN     Subjective:   Feels slightly better    Objective: Intake/Output Summary (Last 24 hours) at 2019 0909  Last data filed at 2019 0549  Gross per 24 hour   Intake 478 ml   Output 700 ml   Net -222 ml      Vitals:   Vitals:    19 0440   BP: 136/65   Pulse: 71   Resp: 16   Temp: 98.4 °F (36.9 °C)   SpO2: 97%     Physical Exam:   Gen:  awake, alert, cooperative, no apparent distress  Head/Eyes:  Normocephalic atraumatic, EOMI   NECK:   symmetrical, trachea midline  LUNGS: Normal Effort   CARDIOVASCULAR:  Normal rate  ABDOMEN: Non tender, non distended, no HSM noted. MUSCULOSKELETAL:  ROM WNL  NEUROLOGIC: Alert and Oriented,  Cranial nerves II-XII are grossly intact.    SKIN:  no bruising or bleeding, normal skin color,  no

## 2019-05-27 NOTE — PROGRESS NOTES
Pt skin assessment shows redness on coxyx area. No other skin breakdown to be shown. Skin assessment was done with this nurse and Estelita Bundy. Pt in bed will keep monitoring.

## 2019-05-27 NOTE — PROGRESS NOTES
COVERING FOR DR ALATORRE FULL CONSULT NOTE PER DR ALATORRE PT ADMITTED WITH RECURRENT C. DIFF  ON VANCOMYCIN   AGREE WITH PRESENT TX WILL NEED PROLONG TX WITH VANCO  WITH SLOW TAPER OVER 2 MONTHS

## 2019-05-27 NOTE — CARE COORDINATION
Pt identified as potential readmission risk. Pt's most recent admission was 5/1/19-5/2/19 for bradycardia. Pt presents to the ED today with ongoing diarrhea for 5-7 days. Pt was seen by PCP, but was unable to provide stool sample at the time of visit. Pt being admitted for further evaluation and treatment of hypomagnesemia, hyponatremia and c. Diff.

## 2019-05-28 VITALS
HEART RATE: 92 BPM | RESPIRATION RATE: 18 BRPM | OXYGEN SATURATION: 97 % | TEMPERATURE: 97.4 F | BODY MASS INDEX: 19.83 KG/M2 | HEIGHT: 65 IN | WEIGHT: 119 LBS | DIASTOLIC BLOOD PRESSURE: 61 MMHG | SYSTOLIC BLOOD PRESSURE: 138 MMHG

## 2019-05-28 PROCEDURE — 6370000000 HC RX 637 (ALT 250 FOR IP): Performed by: NURSE PRACTITIONER

## 2019-05-28 PROCEDURE — 2580000003 HC RX 258: Performed by: NURSE PRACTITIONER

## 2019-05-28 PROCEDURE — 6360000002 HC RX W HCPCS: Performed by: NURSE PRACTITIONER

## 2019-05-28 RX ADMIN — Medication 250 MG: at 12:26

## 2019-05-28 RX ADMIN — HEPARIN SODIUM 5000 UNITS: 5000 INJECTION, SOLUTION INTRAVENOUS; SUBCUTANEOUS at 05:47

## 2019-05-28 RX ADMIN — Medication 250 MG: at 00:18

## 2019-05-28 RX ADMIN — SODIUM CHLORIDE, PRESERVATIVE FREE 10 ML: 5 INJECTION INTRAVENOUS at 09:45

## 2019-05-28 RX ADMIN — Medication 250 MG: at 05:47

## 2019-05-28 RX ADMIN — HEPARIN SODIUM 5000 UNITS: 5000 INJECTION, SOLUTION INTRAVENOUS; SUBCUTANEOUS at 12:26

## 2019-05-28 NOTE — DISCHARGE SUMMARY
Arina Montana 4/29/1930 [id number]  PCP:  Saravanan Flores MD    Admit date: 5/26/2019  Admitting Physician: Mak Thomas MD    Discharge date: 5/28/2019 Discharge Physician: Isaac Zavala MD         Hospital Course and Discharge Diagnoses Include: C. Diff Diarrhea  -stable  - CT abd/pelvis: + diverticulosis  - IVF  - oral vanc pulse tapered regimen started per GI recs  - GI consulted from admission     EDUAR on CKD  - improved with IVF                      Physical Exam on Discharge date: 05/28/19  Gen:  awake, alert, cooperative, no apparent distress  Head/Eyes:  Normocephalic atraumatic, EOMI   NECK:   symmetrical, trachea midline  LUNGS: Normal Effort   CARDIOVASCULAR:  Normal rate  ABDOMEN: Non tender, non distended, no HSM noted. MUSCULOSKELETAL:  ROM WNL  NEUROLOGIC: Alert and Oriented,  Cranial nerves II-XII are grossly intact. SKIN:  no bruising or bleeding, normal skin color,  no redness    Procedures:  See above  Ct Abdomen Pelvis Wo Contrast Additional Contrast? None    Result Date: 5/26/2019  EXAMINATION: CT OF THE ABDOMEN AND PELVIS WITHOUT CONTRAST 5/26/2019 8:15 pm TECHNIQUE: CT of the abdomen and pelvis was performed without the administration of intravenous contrast. Multiplanar reformatted images are provided for review. Dose modulation, iterative reconstruction, and/or weight based adjustment of the mA/kV was utilized to reduce the radiation dose to as low as reasonably achievable. COMPARISON: Renal ultrasound May 1, 2019; CT abdomen pelvis April 10, 2019 HISTORY: ORDERING SYSTEM PROVIDED HISTORY: ABDOMINAL PAIN TECHNOLOGIST PROVIDED HISTORY: Additional Contrast?->None FINDINGS: Image quality is degraded secondary to quantum mottle artifact and metallic streak artifact from overlying extremities and metallic jewelry. Lower Chest: Lung bases are clear. Chronic interstitial changes of the lung bases.  Organs: Evaluation of the solid organs is limited due to lack of intravenous contrast. pneumothorax. The cardiomediastinal silhouette is stable. The osseous structures are stable. No acute process. Us Retroperitoneal Limited    Result Date: 5/1/2019  EXAMINATION: ULTRASOUND OF THE KIDNEYS 5/1/2019 9:06 am COMPARISON: None. HISTORY: ORDERING SYSTEM PROVIDED HISTORY: EDUAR TECHNOLOGIST PROVIDED HISTORY: Ordering Physician Provided Reason for Exam: EDUAR Acuity: Acute Type of Exam: Initial Additional signs and symptoms: nk Relevant Medical/Surgical History: nk FINDINGS: Kidneys: The right kidney measures 10.2 cm in length and the left kidney measures 10.1 cm in length. No hydronephrosis or renal calculi are visualized. There is a cyst within the midpole of the right kidney measuring 1.6 cm. Additional cyst is present within the lower pole of the left kidney measuring 2.2 x 1.4 x 1.6 cm. This lesion appears to contain a small thin septation. No solid suspicious mass lesion is identified. Benign-appearing cyst within the right and left kidney No evidence for hydronephrosis or solid renal mass       Significant Diagnostic Studies at discharge:   CBC:   Lab Results   Component Value Date    WBC 7.0 05/27/2019    RBC 2.78 05/27/2019    HGB 8.3 05/27/2019    HCT 27.4 05/27/2019    MCV 98.6 05/27/2019    MCH 29.9 05/27/2019    MCHC 30.3 05/27/2019    RDW 14.6 05/27/2019     05/27/2019    MPV 8.6 05/27/2019       Patient Instructions:     Medication List      START taking these medications    vancomycin 50 mg/mL oral solution  Commonly known as:  VANCOCIN  Take 2.5 mLs by mouth 4 times daily Take 2.5ml orally four times daily for 14 days, then 2.5ml twice daily for 7 days then 2.5ml daily for 7 days then 2.5ml orally every 3 days for 4 weeks.         CONTINUE taking these medications    amiodarone 200 MG tablet  Commonly known as:  CORDARONE  Take 1 tablet by mouth daily     amLODIPine 5 MG tablet  Commonly known as:  NORVASC  Take 1 tablet by mouth daily     aspirin 81 MG EC tablet  Take 1 tablet by mouth daily     ferrous sulfate 325 (65 Fe) MG tablet  Take 1 tablet by mouth 2 times daily     isosorbide mononitrate 30 MG extended release tablet  Commonly known as:  IMDUR  Take 1 tablet by mouth daily     Lactobacillus Tabs     magnesium oxide 400 (241.3 Mg) MG Tabs tablet  Commonly known as:  MAG-OX  Take 1 tablet by mouth daily     Vitamin D3 5000 units Tabs        STOP taking these medications    Fidaxomicin 200 MG Tabs tablet  Commonly known as:  DIFICID           Where to Get Your Medications      You can get these medications from any pharmacy    Bring a paper prescription for each of these medications  · vancomycin 50 mg/mL oral solution            Code Status: Full Code     Consults:   IP CONSULT TO PRIMARY CARE PROVIDER  IP CONSULT TO HOSPITALIST  IP CONSULT TO GI  IP CONSULT TO GI    Diet: cardiac diet    Activity: activity as tolerated   Work:    Discharged Condition: good    Prognosis: Fair - Good    Disposition: home      Follow-up with   1. PCP within   5-7    Days    Follow up labs: BMP in 1 week       Discharge Physician Signed: Antoine Haynes M.D. The patient was seen and examined on day of discharge and this discharge summary is in conjunction with any daily progress note from day of discharge.   Time spent on discharge in the examination, evaluation, counseling and review of medications and discharge plan: 34 minutes

## 2019-05-28 NOTE — CONSULTS
SALPINGO-OOPHORECTOMY  1999         Current Medications:    Medications    Scheduled Medications:    sodium chloride flush  10 mL Intravenous 2 times per day    vancomycin  250 mg Oral 4 times per day    heparin (porcine)  5,000 Units Subcutaneous 3 times per day    pneumococcal 13-valent conjugate  0.5 mL Intramuscular Once     PRN Medications: sodium chloride flush, magnesium hydroxide, ondansetron, acetaminophen      Allergies:  Lisinopril    Social History:   TOBACCO:   reports that she has never smoked. She has never used smokeless tobacco.  ETOH:   reports that she drinks alcohol. Family History:       Problem Relation Age of Onset    Diabetes Mother     Heart Disease Mother     High Blood Pressure Mother     Diabetes Father     High Blood Pressure Father     Heart Disease Father        No family history of colon cancer, Crohn's disease, or ulcerative colitis.     REVIEW OF SYSTEMS:    The positive ROS will be identified in bold, otherwise ROS are negative     CONSTITUTIONAL:  Neg   Recent weight changes, fatigue, fever, chills or night sweats  RESPIRATORY:   Neg SOB, wheeze, cough, sputum, hemoptysis or bronchitis  CARDIOVASCULAR:  Neg chest pain, palpitations, dyspnea on exertion, orthopnea, paroxysmal nocturnal dyspnea or edema  GASTROINTESTINAL:  SEE HPI  HEMATOLOGIC/LYMPHATIC:  Neg  Anemia, bleeding tendency    PHYSICAL EXAM:      Vitals:    BP (!) 127/59   Pulse 105   Temp 99.4 °F (37.4 °C) (Oral)   Resp 18   Ht 5' 5\" (1.651 m)   Wt 119 lb (54 kg)   SpO2 96%   BMI 19.80 kg/m²     General Appearance:    Alert, cooperative, no distress, appears stated age   HEENT:    Normocephalic, atraumatic, Conjunctiva pale   Neck:   Supple, symmetrical, trachea midline   Lungs:     Clear to auscultation bilaterally, respirations unlabored   Chest Wall:    No tenderness or deformity    Heart:    Regular rate and rhythm, S1 and S2 normal   Abdomen:     Soft, non-tender, bowel sounds active all four quadrants,     no masses, no organomegaly, no ascites    Rectal:    Deferred   Extremities:   Extremities normal, atraumatic, no cyanosis or edema   Pulses:   2+ and symmetric all extremities   Skin:   Skin color, texture, turgor normal, no rashes or lesions   Lymph nodes:   No abnormality   Neurologic:   No focal deficits, moving all four extremities            DATA:    ABGs: No results found for: PHART, PO2ART, JHF4JMJ  CBC:   Recent Labs     05/26/19  1750 05/27/19  0724   WBC 11.1* 7.0   HGB 8.8* 8.3*    258     BMP:    Recent Labs     05/26/19  1750 05/27/19  0724   * 138   K 3.5 3.6   CL 98* 107   CO2 19* 18*   BUN 27* 22   CREATININE 1.8* 1.9*   GLUCOSE 153* 100*     Magnesium:   Lab Results   Component Value Date    MG 2.5 05/27/2019     Hepatic:   Recent Labs     05/26/19  1750   AST 11*   ALT 7*   BILITOT 0.2   ALKPHOS 56     No results for input(s): LIPASE, AMYLASE in the last 72 hours. No results for input(s): PROTIME, INR in the last 72 hours. No results for input(s): PTT in the last 72 hours. Lipids: No results for input(s): CHOL, HDL in the last 72 hours. Invalid input(s): LDLCALCU  INR: No results for input(s): INR in the last 72 hours.   TSH: No results found for: TSH    Intake/Output Summary (Last 24 hours) at 5/28/2019 0944  Last data filed at 5/27/2019 2314  Gross per 24 hour   Intake --   Output 550 ml   Net -550 ml         Imaging Studies: Reviewed      IMPRESSION:      Patient Active Problem List   Diagnosis Code    Essential hypertension I10    Gait disturbance R26.9    Type II diabetes mellitus ([facility] [address] E11.9    Colon cancer screening Z12.11    Anemia, chronic disease D63.8    PAF (paroxysmal atrial fibrillation) (HCC) I48.0    CAD S/P percutaneous coronary angioplasty I25.10, Z98.61    CKD (chronic kidney disease), stage IV (HCC) N18.4    Chronic anticoagulation Z79.01    Clostridium difficile colitis A04.72    Bradycardia R00.1    History of gastrointestinal

## 2019-05-29 PROBLEM — Z12.11 COLON CANCER SCREENING: Status: RESOLVED | Noted: 2019-04-29 | Resolved: 2019-05-29

## 2019-07-01 ENCOUNTER — TELEPHONE (OUTPATIENT)
Dept: INTERNAL MEDICINE CLINIC | Age: 84
End: 2019-07-01

## 2019-07-15 ENCOUNTER — TELEPHONE (OUTPATIENT)
Dept: INTERNAL MEDICINE CLINIC | Age: 84
End: 2019-07-15

## 2019-07-15 DIAGNOSIS — A04.72 CLOSTRIDIUM DIFFICILE COLITIS: Primary | ICD-10-CM

## 2019-07-15 NOTE — TELEPHONE ENCOUNTER
Lab order was placed   Pt notified but stated due to no transportation she cannot get it done tonight, she said she will try to get it done tomorrow and will schedule appointment after she gets it done

## 2019-07-16 DIAGNOSIS — A04.72 CLOSTRIDIUM DIFFICILE COLITIS: ICD-10-CM

## 2019-07-16 DIAGNOSIS — R19.7 DIARRHEA, UNSPECIFIED TYPE: ICD-10-CM

## 2019-07-16 DIAGNOSIS — R79.89 ELEVATED TSH: ICD-10-CM

## 2019-07-16 DIAGNOSIS — E03.9 HYPOTHYROIDISM, UNSPECIFIED TYPE: ICD-10-CM

## 2019-07-16 LAB
A/G RATIO: 1.3 (ref 1.1–2.2)
ALBUMIN SERPL-MCNC: 4.1 G/DL (ref 3.4–5)
ALP BLD-CCNC: 64 U/L (ref 40–129)
ALT SERPL-CCNC: 9 U/L (ref 10–40)
ANION GAP SERPL CALCULATED.3IONS-SCNC: 18 MMOL/L (ref 3–16)
AST SERPL-CCNC: 12 U/L (ref 15–37)
BASOPHILS ABSOLUTE: 0.1 K/UL (ref 0–0.2)
BASOPHILS RELATIVE PERCENT: 1 %
BILIRUB SERPL-MCNC: <0.2 MG/DL (ref 0–1)
BILIRUBIN URINE: NEGATIVE
BLOOD, URINE: NEGATIVE
BUN BLDV-MCNC: 34 MG/DL (ref 7–20)
CALCIUM SERPL-MCNC: 9.4 MG/DL (ref 8.3–10.6)
CHLORIDE BLD-SCNC: 102 MMOL/L (ref 99–110)
CLARITY: CLEAR
CO2: 18 MMOL/L (ref 21–32)
COLOR: YELLOW
CREAT SERPL-MCNC: 2.5 MG/DL (ref 0.6–1.2)
EOSINOPHILS ABSOLUTE: 0.2 K/UL (ref 0–0.6)
EOSINOPHILS RELATIVE PERCENT: 3 %
EPITHELIAL CELLS, UA: 2 /HPF (ref 0–5)
GFR AFRICAN AMERICAN: 22
GFR NON-AFRICAN AMERICAN: 18
GLOBULIN: 3.2 G/DL
GLUCOSE BLD-MCNC: 97 MG/DL (ref 70–99)
GLUCOSE URINE: NEGATIVE MG/DL
HCT VFR BLD CALC: 30.2 % (ref 36–48)
HEMOGLOBIN: 10 G/DL (ref 12–16)
HYALINE CASTS: 5 /LPF (ref 0–8)
KETONES, URINE: NEGATIVE MG/DL
LEUKOCYTE ESTERASE, URINE: ABNORMAL
LYMPHOCYTES ABSOLUTE: 1.2 K/UL (ref 1–5.1)
LYMPHOCYTES RELATIVE PERCENT: 22.1 %
MCH RBC QN AUTO: 31.8 PG (ref 26–34)
MCHC RBC AUTO-ENTMCNC: 33.1 G/DL (ref 31–36)
MCV RBC AUTO: 96 FL (ref 80–100)
MICROSCOPIC EXAMINATION: YES
MONOCYTES ABSOLUTE: 0.6 K/UL (ref 0–1.3)
MONOCYTES RELATIVE PERCENT: 11 %
NEUTROPHILS ABSOLUTE: 3.5 K/UL (ref 1.7–7.7)
NEUTROPHILS RELATIVE PERCENT: 62.9 %
NITRITE, URINE: NEGATIVE
PDW BLD-RTO: 14.9 % (ref 12.4–15.4)
PH UA: 6 (ref 5–8)
PLATELET # BLD: 294 K/UL (ref 135–450)
PMV BLD AUTO: 7 FL (ref 5–10.5)
POTASSIUM SERPL-SCNC: 4.8 MMOL/L (ref 3.5–5.1)
PROTEIN UA: 30 MG/DL
RBC # BLD: 3.15 M/UL (ref 4–5.2)
RBC UA: 5 /HPF (ref 0–4)
SODIUM BLD-SCNC: 138 MMOL/L (ref 136–145)
SPECIFIC GRAVITY UA: 1.01 (ref 1–1.03)
TOTAL PROTEIN: 7.3 G/DL (ref 6.4–8.2)
TSH SERPL DL<=0.05 MIU/L-ACNC: 2.39 UIU/ML (ref 0.27–4.2)
UROBILINOGEN, URINE: 0.2 E.U./DL
WBC # BLD: 5.5 K/UL (ref 4–11)
WBC UA: 5 /HPF (ref 0–5)

## 2019-07-17 ENCOUNTER — OFFICE VISIT (OUTPATIENT)
Dept: INTERNAL MEDICINE CLINIC | Age: 84
End: 2019-07-17
Payer: MEDICARE

## 2019-07-17 VITALS
DIASTOLIC BLOOD PRESSURE: 62 MMHG | OXYGEN SATURATION: 98 % | BODY MASS INDEX: 18.74 KG/M2 | SYSTOLIC BLOOD PRESSURE: 112 MMHG | WEIGHT: 112.6 LBS | RESPIRATION RATE: 14 BRPM | HEART RATE: 77 BPM

## 2019-07-17 DIAGNOSIS — N18.4 STAGE 4 CHRONIC KIDNEY DISEASE (HCC): ICD-10-CM

## 2019-07-17 DIAGNOSIS — A04.72 CLOSTRIDIUM DIFFICILE COLITIS: Primary | ICD-10-CM

## 2019-07-17 LAB — C DIFF TOXIN/ANTIGEN: ABNORMAL

## 2019-07-17 PROCEDURE — 1036F TOBACCO NON-USER: CPT | Performed by: INTERNAL MEDICINE

## 2019-07-17 PROCEDURE — 4040F PNEUMOC VAC/ADMIN/RCVD: CPT | Performed by: INTERNAL MEDICINE

## 2019-07-17 PROCEDURE — G8420 CALC BMI NORM PARAMETERS: HCPCS | Performed by: INTERNAL MEDICINE

## 2019-07-17 PROCEDURE — G8598 ASA/ANTIPLAT THER USED: HCPCS | Performed by: INTERNAL MEDICINE

## 2019-07-17 PROCEDURE — G8427 DOCREV CUR MEDS BY ELIG CLIN: HCPCS | Performed by: INTERNAL MEDICINE

## 2019-07-17 PROCEDURE — 1090F PRES/ABSN URINE INCON ASSESS: CPT | Performed by: INTERNAL MEDICINE

## 2019-07-17 PROCEDURE — 99215 OFFICE O/P EST HI 40 MIN: CPT | Performed by: INTERNAL MEDICINE

## 2019-07-17 PROCEDURE — 1123F ACP DISCUSS/DSCN MKR DOCD: CPT | Performed by: INTERNAL MEDICINE

## 2019-07-17 RX ORDER — VANCOMYCIN HYDROCHLORIDE 125 MG/1
125 CAPSULE ORAL 4 TIMES DAILY
Qty: 40 CAPSULE | Refills: 0 | Status: SHIPPED | OUTPATIENT
Start: 2019-07-17 | End: 2019-08-06 | Stop reason: SDUPTHER

## 2019-07-17 RX ORDER — AMLODIPINE BESYLATE 5 MG/1
5 TABLET ORAL DAILY
Qty: 30 TABLET | Refills: 2 | Status: SHIPPED | OUTPATIENT
Start: 2019-07-17 | End: 2019-07-29 | Stop reason: SDUPTHER

## 2019-07-17 RX ORDER — ASPIRIN 81 MG/1
81 TABLET ORAL DAILY
Qty: 30 TABLET | Refills: 0 | Status: SHIPPED | OUTPATIENT
Start: 2019-07-17

## 2019-07-17 RX ORDER — AMIODARONE HYDROCHLORIDE 200 MG/1
200 TABLET ORAL DAILY
Qty: 90 TABLET | Refills: 1 | Status: SHIPPED | OUTPATIENT
Start: 2019-07-17

## 2019-07-17 RX ORDER — ISOSORBIDE MONONITRATE 30 MG/1
30 TABLET, EXTENDED RELEASE ORAL DAILY
Qty: 90 TABLET | Refills: 1 | Status: SHIPPED | OUTPATIENT
Start: 2019-07-17 | End: 2019-07-29 | Stop reason: SDUPTHER

## 2019-07-17 ASSESSMENT — ENCOUNTER SYMPTOMS
VOMITING: 0
SHORTNESS OF BREATH: 0
BLOOD IN STOOL: 0
ANAL BLEEDING: 0
COLOR CHANGE: 0
CHEST TIGHTNESS: 0
COUGH: 0
BACK PAIN: 0
NAUSEA: 0
ABDOMINAL DISTENTION: 0
TROUBLE SWALLOWING: 0
EYE ITCHING: 0
EYE DISCHARGE: 0
WHEEZING: 0
ABDOMINAL PAIN: 0
DIARRHEA: 1

## 2019-07-23 ENCOUNTER — TELEPHONE (OUTPATIENT)
Dept: INTERNAL MEDICINE CLINIC | Age: 84
End: 2019-07-23

## 2019-07-24 ENCOUNTER — TELEPHONE (OUTPATIENT)
Dept: INTERNAL MEDICINE CLINIC | Age: 84
End: 2019-07-24

## 2019-07-24 NOTE — TELEPHONE ENCOUNTER
I called and spoke with Ms Henok Martin about her C diff treatment  I had given her 10 of Vanco QID in preparation and aticipation of proceeding to Fecal micorbiota transplant. She saw Dr Carlos Waters who set her up for this at [facility] , but the patient notified me she is now asymptomatic and won't go.   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

## 2019-07-29 RX ORDER — ISOSORBIDE MONONITRATE 30 MG/1
30 TABLET, EXTENDED RELEASE ORAL DAILY
Qty: 90 TABLET | Refills: 1 | Status: SHIPPED | OUTPATIENT
Start: 2019-07-29

## 2019-07-29 RX ORDER — AMLODIPINE BESYLATE 5 MG/1
5 TABLET ORAL DAILY
Qty: 30 TABLET | Refills: 2 | Status: SHIPPED | OUTPATIENT
Start: 2019-07-29

## 2019-08-06 RX ORDER — VANCOMYCIN HYDROCHLORIDE 125 MG/1
125 CAPSULE ORAL 4 TIMES DAILY
Qty: 40 CAPSULE | Refills: 0 | Status: SHIPPED | OUTPATIENT
Start: 2019-08-06 | End: 2019-08-16

## 2019-11-21 ENCOUNTER — IMPORTED ENCOUNTER (OUTPATIENT)
Dept: URBAN - METROPOLITAN AREA CLINIC 9 | Facility: CLINIC | Age: 84
End: 2019-11-21

## 2021-10-16 ASSESSMENT — VISUAL ACUITY
OS_SC: 20/40 - SN
OD_SC: 20/40 -2 SN
OS_CC: 20/30 - SN
OD_CC: 20/25 - SN
OD_CC: 20/20 SN
OS_CC: 20/25 SN

## 2021-10-16 ASSESSMENT — TONOMETRY
OD_IOP_MMHG: 10
OS_IOP_MMHG: 11

## 2021-10-16 ASSESSMENT — KERATOMETRY
OD_K1POWER_DIOPTERS: 44
OS_AXISANGLE2_DEGREES: 163
OS_K2POWER_DIOPTERS: 45.25
OS_K1POWER_DIOPTERS: 44.5
OD_AXISANGLE_DEGREES: 116
OD_K2POWER_DIOPTERS: 45.75
OD_AXISANGLE2_DEGREES: 26
OS_AXISANGLE_DEGREES: 73